# Patient Record
Sex: FEMALE | Race: OTHER | Employment: UNEMPLOYED | URBAN - METROPOLITAN AREA
[De-identification: names, ages, dates, MRNs, and addresses within clinical notes are randomized per-mention and may not be internally consistent; named-entity substitution may affect disease eponyms.]

---

## 2017-01-27 RX ORDER — LOSARTAN POTASSIUM AND HYDROCHLOROTHIAZIDE 12.5; 1 MG/1; MG/1
TABLET ORAL
Qty: 90 TABLET | Refills: 1 | Status: SHIPPED | OUTPATIENT
Start: 2017-01-27 | End: 2017-07-19

## 2017-01-27 RX ORDER — PANTOPRAZOLE SODIUM 40 MG/1
TABLET, DELAYED RELEASE ORAL
Qty: 180 TABLET | Refills: 1 | Status: SHIPPED | OUTPATIENT
Start: 2017-01-27 | End: 2017-07-19

## 2017-01-27 RX ORDER — CARVEDILOL 6.25 MG/1
TABLET ORAL
Qty: 180 TABLET | Refills: 1 | Status: SHIPPED | OUTPATIENT
Start: 2017-01-27 | End: 2017-07-19

## 2017-07-21 RX ORDER — PANTOPRAZOLE SODIUM 40 MG/1
TABLET, DELAYED RELEASE ORAL
Qty: 180 TABLET | Refills: 1 | Status: SHIPPED | OUTPATIENT
Start: 2017-07-21 | End: 2017-10-19

## 2017-07-21 RX ORDER — CARVEDILOL 6.25 MG/1
TABLET ORAL
Qty: 180 TABLET | Refills: 1 | Status: SHIPPED | OUTPATIENT
Start: 2017-07-21 | End: 2017-10-19

## 2017-07-21 RX ORDER — LOSARTAN POTASSIUM AND HYDROCHLOROTHIAZIDE 12.5; 1 MG/1; MG/1
TABLET ORAL
Qty: 90 TABLET | Refills: 1 | Status: SHIPPED | OUTPATIENT
Start: 2017-07-21 | End: 2017-10-19

## 2017-08-09 ENCOUNTER — TELEPHONE (OUTPATIENT)
Dept: INTERNAL MEDICINE CLINIC | Facility: CLINIC | Age: 52
End: 2017-08-09

## 2017-08-09 NOTE — TELEPHONE ENCOUNTER
Actions Requested:  OV with Dr. Ovidio Mendoza 8/10 8:30 AM [facility], sent to Dr. Ovidio Mendoza and Dr. Kemal Jewell PCP as Edenilson Solis, thank you.   Problem: vaginal itching/burning/irritation  Onset and Timing: about 2 weeks  Associated Symptoms: vague feeling that she is aware of her a

## 2017-08-10 ENCOUNTER — OFFICE VISIT (OUTPATIENT)
Dept: INTERNAL MEDICINE CLINIC | Facility: CLINIC | Age: 52
End: 2017-08-10

## 2017-08-10 ENCOUNTER — LAB ENCOUNTER (OUTPATIENT)
Dept: LAB | Facility: HOSPITAL | Age: 52
End: 2017-08-10
Attending: INTERNAL MEDICINE
Payer: COMMERCIAL

## 2017-08-10 VITALS
SYSTOLIC BLOOD PRESSURE: 142 MMHG | HEART RATE: 75 BPM | HEIGHT: 64 IN | WEIGHT: 183.13 LBS | BODY MASS INDEX: 31.27 KG/M2 | DIASTOLIC BLOOD PRESSURE: 91 MMHG | RESPIRATION RATE: 18 BRPM

## 2017-08-10 DIAGNOSIS — R06.02 SOB (SHORTNESS OF BREATH): ICD-10-CM

## 2017-08-10 DIAGNOSIS — N89.8 VAGINAL DISCHARGE: ICD-10-CM

## 2017-08-10 DIAGNOSIS — R39.9 URINARY SYMPTOM OR SIGN: ICD-10-CM

## 2017-08-10 DIAGNOSIS — R10.2 PELVIC PAIN: Primary | ICD-10-CM

## 2017-08-10 LAB
ANION GAP SERPL CALC-SCNC: 7 MMOL/L (ref 0–18)
APPEARANCE: YELLOW
BASOPHILS # BLD: 0 K/UL (ref 0–0.2)
BASOPHILS NFR BLD: 0 %
BILIRUBIN: NEGATIVE
BUN SERPL-MCNC: 7 MG/DL (ref 8–20)
BUN/CREAT SERPL: 11.9 (ref 10–20)
CALCIUM SERPL-MCNC: 9.8 MG/DL (ref 8.5–10.5)
CHLORIDE SERPL-SCNC: 101 MMOL/L (ref 95–110)
CO2 SERPL-SCNC: 30 MMOL/L (ref 22–32)
CREAT SERPL-MCNC: 0.59 MG/DL (ref 0.5–1.5)
EOSINOPHIL # BLD: 0.2 K/UL (ref 0–0.7)
EOSINOPHIL NFR BLD: 2 %
ERYTHROCYTE [DISTWIDTH] IN BLOOD BY AUTOMATED COUNT: 14.8 % (ref 11–15)
GLUCOSE (URINE DIPSTICK): NEGATIVE MG/DL
GLUCOSE SERPL-MCNC: 140 MG/DL (ref 70–99)
HCT VFR BLD AUTO: 35.7 % (ref 35–48)
HGB BLD-MCNC: 12.1 G/DL (ref 12–16)
KETONES (URINE DIPSTICK): NEGATIVE MG/DL
LEUKOCYTES: NEGATIVE
LYMPHOCYTES # BLD: 2.3 K/UL (ref 1–4)
LYMPHOCYTES NFR BLD: 31 %
MCH RBC QN AUTO: 29.4 PG (ref 27–32)
MCHC RBC AUTO-ENTMCNC: 33.9 G/DL (ref 32–37)
MCV RBC AUTO: 86.5 FL (ref 80–100)
MONOCYTES # BLD: 0.4 K/UL (ref 0–1)
MONOCYTES NFR BLD: 6 %
MULTISTIX LOT#: ABNORMAL NUMERIC
NEUTROPHILS # BLD AUTO: 4.6 K/UL (ref 1.8–7.7)
NEUTROPHILS NFR BLD: 61 %
NITRITE, URINE: NEGATIVE
OSMOLALITY UR CALC.SUM OF ELEC: 286 MOSM/KG (ref 275–295)
PH, URINE: 7.5 (ref 4.5–8)
PLATELET # BLD AUTO: 254 K/UL (ref 140–400)
PMV BLD AUTO: 10.1 FL (ref 7.4–10.3)
POTASSIUM SERPL-SCNC: 3.9 MMOL/L (ref 3.3–5.1)
PROTEIN (URINE DIPSTICK): NEGATIVE MG/DL
RBC # BLD AUTO: 4.12 M/UL (ref 3.7–5.4)
SODIUM SERPL-SCNC: 138 MMOL/L (ref 136–144)
SPECIFIC GRAVITY: 1.01 (ref 1–1.03)
URINE-COLOR: CLEAR
UROBILINOGEN,SEMI-QN: 0.2 MG/DL (ref 0–1.9)
WBC # BLD AUTO: 7.6 K/UL (ref 4–11)

## 2017-08-10 PROCEDURE — 36415 COLL VENOUS BLD VENIPUNCTURE: CPT

## 2017-08-10 PROCEDURE — 81002 URINALYSIS NONAUTO W/O SCOPE: CPT | Performed by: INTERNAL MEDICINE

## 2017-08-10 PROCEDURE — 99214 OFFICE O/P EST MOD 30 MIN: CPT | Performed by: INTERNAL MEDICINE

## 2017-08-10 PROCEDURE — 85025 COMPLETE CBC W/AUTO DIFF WBC: CPT

## 2017-08-10 PROCEDURE — 80048 BASIC METABOLIC PNL TOTAL CA: CPT

## 2017-08-10 PROCEDURE — 99212 OFFICE O/P EST SF 10 MIN: CPT | Performed by: INTERNAL MEDICINE

## 2017-08-10 NOTE — PROGRESS NOTES
HPI:    Patient ID: Rafael Reed is a 45year old female. She has lower pelvic discomfort, vaginal discharge, and urinary frequency. She was on clindamycin for dental work. She has been having some SOB also.       Vaginal Discharge   The patient's p (VOLTAREN) 1 % Transdermal Gel EXTERNAL APPLICATION 2 TIMES A DAY AS NEEDED Disp: 1 Tube Rfl: 3   Cyanocobalamin (VITAMIN B-12) 1000 MCG Sublingual SL Tab Place  under the tongue.  take 1 Tablet by Sublingual route  every day Disp:  Rfl:    Fluocinolone Ace of yeast.         [id number]

## 2017-08-12 ENCOUNTER — TELEPHONE (OUTPATIENT)
Dept: INTERNAL MEDICINE CLINIC | Facility: CLINIC | Age: 52
End: 2017-08-12

## 2017-08-12 LAB
GENITAL VAGINOSIS SCREEN: POSITIVE
TRICHOMONAS SCREEN: NEGATIVE

## 2017-08-12 RX ORDER — METRONIDAZOLE 500 MG/1
500 TABLET ORAL 2 TIMES DAILY
Qty: 14 TABLET | Refills: 0 | Status: CANCELLED | OUTPATIENT
Start: 2017-08-12 | End: 2017-08-19

## 2017-08-12 NOTE — TELEPHONE ENCOUNTER
Please call pt. Her vaginal culture showed that she has bacterial vaginosis. This is just overgrowth of normal bacteria. It is treated with metronidazole vaginal gel or oral pills.   The advantage to the pills is that they are not messy, but the disadvant

## 2017-08-14 NOTE — TELEPHONE ENCOUNTER
LMTCB. Please transfer to Triage any time. Encounter changed to Acute from Results to be addressed in more timely manner.

## 2017-08-15 ENCOUNTER — TELEPHONE (OUTPATIENT)
Dept: INTERNAL MEDICINE CLINIC | Facility: CLINIC | Age: 52
End: 2017-08-15

## 2017-08-15 RX ORDER — METRONIDAZOLE 500 MG/1
500 TABLET ORAL 2 TIMES DAILY
Qty: 14 TABLET | Refills: 0 | Status: SHIPPED | OUTPATIENT
Start: 2017-08-15 | End: 2017-08-22

## 2017-08-15 RX ORDER — METRONIDAZOLE 7.5 MG/G
1 GEL VAGINAL NIGHTLY
Qty: 1 TUBE | Refills: 0 | Status: SHIPPED | OUTPATIENT
Start: 2017-08-15 | End: 2017-08-15

## 2017-08-15 NOTE — TELEPHONE ENCOUNTER
Pt calling back, advised of results. She verbalized understanding. She prefers to use the vaginal gel, medication sent to requested Chris W Teresita Slater in [facility].

## 2017-08-15 NOTE — TELEPHONE ENCOUNTER
Pt calling back, she states vaginal gel is $90 and wants us to send pills instead, she thinks they will be covered. Avni sent to  to clarify order for oral pills.

## 2017-09-11 ENCOUNTER — HOSPITAL ENCOUNTER (OUTPATIENT)
Dept: MAMMOGRAPHY | Facility: HOSPITAL | Age: 52
Discharge: HOME | End: 2017-09-11
Attending: INTERNAL MEDICINE
Payer: COMMERCIAL

## 2017-09-11 ENCOUNTER — HOSPITAL ENCOUNTER (OUTPATIENT)
Dept: ULTRASOUND IMAGING | Facility: HOSPITAL | Age: 52
Discharge: HOME | End: 2017-09-11
Attending: INTERNAL MEDICINE
Payer: COMMERCIAL

## 2017-09-11 DIAGNOSIS — Z00.00 ROUTINE PHYSICAL EXAMINATION: ICD-10-CM

## 2017-09-11 DIAGNOSIS — R10.2 PELVIC PAIN: ICD-10-CM

## 2017-09-11 PROCEDURE — 76830 TRANSVAGINAL US NON-OB: CPT | Performed by: INTERNAL MEDICINE

## 2017-09-11 PROCEDURE — 76856 US EXAM PELVIC COMPLETE: CPT | Performed by: INTERNAL MEDICINE

## 2017-09-11 PROCEDURE — 77067 SCR MAMMO BI INCL CAD: CPT | Performed by: INTERNAL MEDICINE

## 2017-09-11 PROCEDURE — 93975 VASCULAR STUDY: CPT | Performed by: INTERNAL MEDICINE

## 2017-10-19 ENCOUNTER — LAB ENCOUNTER (OUTPATIENT)
Dept: LAB | Facility: HOSPITAL | Age: 52
End: 2017-10-19
Attending: INTERNAL MEDICINE
Payer: COMMERCIAL

## 2017-10-19 ENCOUNTER — OFFICE VISIT (OUTPATIENT)
Dept: INTERNAL MEDICINE CLINIC | Facility: CLINIC | Age: 52
End: 2017-10-19

## 2017-10-19 ENCOUNTER — TELEPHONE (OUTPATIENT)
Dept: INTERNAL MEDICINE CLINIC | Facility: CLINIC | Age: 52
End: 2017-10-19

## 2017-10-19 ENCOUNTER — MED REC SCAN ONLY (OUTPATIENT)
Dept: INTERNAL MEDICINE CLINIC | Facility: CLINIC | Age: 52
End: 2017-10-19

## 2017-10-19 VITALS
DIASTOLIC BLOOD PRESSURE: 92 MMHG | BODY MASS INDEX: 30.56 KG/M2 | TEMPERATURE: 98 F | HEART RATE: 62 BPM | WEIGHT: 179 LBS | HEIGHT: 64 IN | RESPIRATION RATE: 16 BRPM | SYSTOLIC BLOOD PRESSURE: 143 MMHG

## 2017-10-19 DIAGNOSIS — Z00.00 ROUTINE PHYSICAL EXAMINATION: ICD-10-CM

## 2017-10-19 DIAGNOSIS — G89.29 CHRONIC PAIN OF LEFT KNEE: ICD-10-CM

## 2017-10-19 DIAGNOSIS — L03.012 CELLULITIS OF LEFT INDEX FINGER: ICD-10-CM

## 2017-10-19 DIAGNOSIS — M25.562 CHRONIC PAIN OF LEFT KNEE: ICD-10-CM

## 2017-10-19 DIAGNOSIS — I10 ESSENTIAL HYPERTENSION: ICD-10-CM

## 2017-10-19 DIAGNOSIS — Z23 NEED FOR VACCINATION: Primary | ICD-10-CM

## 2017-10-19 DIAGNOSIS — K21.9 GERD WITHOUT ESOPHAGITIS: ICD-10-CM

## 2017-10-19 DIAGNOSIS — L30.9 ECZEMA, UNSPECIFIED TYPE: ICD-10-CM

## 2017-10-19 DIAGNOSIS — Z01.419 PAP TEST, AS PART OF ROUTINE GYNECOLOGICAL EXAMINATION: ICD-10-CM

## 2017-10-19 DIAGNOSIS — R73.9 ELEVATED BLOOD SUGAR: ICD-10-CM

## 2017-10-19 DIAGNOSIS — R00.2 PALPITATIONS: ICD-10-CM

## 2017-10-19 DIAGNOSIS — J01.01 ACUTE RECURRENT MAXILLARY SINUSITIS: ICD-10-CM

## 2017-10-19 PROCEDURE — 36415 COLL VENOUS BLD VENIPUNCTURE: CPT

## 2017-10-19 PROCEDURE — 82043 UR ALBUMIN QUANTITATIVE: CPT

## 2017-10-19 PROCEDURE — 82570 ASSAY OF URINE CREATININE: CPT

## 2017-10-19 PROCEDURE — 82306 VITAMIN D 25 HYDROXY: CPT

## 2017-10-19 PROCEDURE — 90471 IMMUNIZATION ADMIN: CPT | Performed by: INTERNAL MEDICINE

## 2017-10-19 PROCEDURE — 82607 VITAMIN B-12: CPT

## 2017-10-19 PROCEDURE — 83036 HEMOGLOBIN GLYCOSYLATED A1C: CPT

## 2017-10-19 PROCEDURE — 84443 ASSAY THYROID STIM HORMONE: CPT

## 2017-10-19 PROCEDURE — 80053 COMPREHEN METABOLIC PANEL: CPT

## 2017-10-19 PROCEDURE — G0438 PPPS, INITIAL VISIT: HCPCS | Performed by: INTERNAL MEDICINE

## 2017-10-19 PROCEDURE — 81003 URINALYSIS AUTO W/O SCOPE: CPT

## 2017-10-19 PROCEDURE — 80061 LIPID PANEL: CPT

## 2017-10-19 PROCEDURE — 90686 IIV4 VACC NO PRSV 0.5 ML IM: CPT | Performed by: INTERNAL MEDICINE

## 2017-10-19 PROCEDURE — 85025 COMPLETE CBC W/AUTO DIFF WBC: CPT

## 2017-10-19 PROCEDURE — 99214 OFFICE O/P EST MOD 30 MIN: CPT | Performed by: INTERNAL MEDICINE

## 2017-10-19 PROCEDURE — 99396 PREV VISIT EST AGE 40-64: CPT | Performed by: INTERNAL MEDICINE

## 2017-10-19 RX ORDER — CLINDAMYCIN PHOSPHATE 10 MG/G
1 GEL TOPICAL AS NEEDED
Qty: 60 G | Refills: 3 | Status: SHIPPED | OUTPATIENT
Start: 2017-10-19 | End: 2019-04-11

## 2017-10-19 RX ORDER — LOSARTAN POTASSIUM AND HYDROCHLOROTHIAZIDE 12.5; 1 MG/1; MG/1
TABLET ORAL
Qty: 90 TABLET | Refills: 2 | Status: SHIPPED | OUTPATIENT
Start: 2017-10-19 | End: 2017-12-07

## 2017-10-19 RX ORDER — PANTOPRAZOLE SODIUM 40 MG/1
40 TABLET, DELAYED RELEASE ORAL 2 TIMES DAILY
Qty: 180 TABLET | Refills: 2 | Status: SHIPPED | OUTPATIENT
Start: 2017-10-19 | End: 2018-08-23

## 2017-10-19 RX ORDER — FLUTICASONE PROPIONATE 50 MCG
SPRAY, SUSPENSION (ML) NASAL
Qty: 3 BOTTLE | Refills: 1 | Status: SHIPPED | OUTPATIENT
Start: 2017-10-19 | End: 2018-07-12

## 2017-10-19 RX ORDER — MOMETASONE FUROATE 1 MG/G
CREAM TOPICAL
Qty: 45 G | Refills: 0 | Status: SHIPPED | OUTPATIENT
Start: 2017-10-19 | End: 2020-01-31

## 2017-10-19 RX ORDER — FERROUS FUMARATE 106; 10; 6; 30; 5; 1; 15; 10; 200; 6.9; 18.2; .8; 1.3 MG/1; MG/1; MG/1; MG/1; MG/1; MG/1; UG/1; MG/1; MG/1; MG/1; MG/1; MG/1; MG/1
CAPSULE ORAL
Qty: 90 CAPSULE | Refills: 2 | Status: SHIPPED | OUTPATIENT
Start: 2017-10-19 | End: 2017-12-08

## 2017-10-19 RX ORDER — CARVEDILOL 6.25 MG/1
6.25 TABLET ORAL 2 TIMES DAILY
Qty: 180 TABLET | Refills: 2 | Status: SHIPPED | OUTPATIENT
Start: 2017-10-19 | End: 2018-11-14

## 2017-10-19 RX ORDER — AZITHROMYCIN 250 MG/1
TABLET, FILM COATED ORAL
Qty: 6 TABLET | Refills: 0 | Status: SHIPPED | OUTPATIENT
Start: 2017-10-19 | End: 2017-11-06 | Stop reason: ALTCHOICE

## 2017-10-19 NOTE — PROGRESS NOTES
REASON FOR VISIT:    Chelle Lassiter is a 47year old female who presents for an [address].       lmp-every month x 3 months but has been irregular before that.has a hx of ovarian cyst as well as fibroids which will need follow-up due to irre problem. The current episode started more than 1 year ago. The problem has been waxing and waning. On average, each episode lasts 10 minutes. The symptoms are aggravated by stress. Associated symptoms include anxiety and malaise/fatigue.  Pertinent negative Acute recurrent maxillary sinusitis     Cellulitis of left index finger     Elevated blood sugar     Dermatitis, eczematoid     Pap test, as part of routine gynecological examination      General Health     How would you describe your current health state? gender LDL Cholesterol (mg/dL)   Date Value   10/19/2017 139 (H)   02/17/2016 114 (H)       Diabetes Screening  if history of high blood pressure or other  risk factors GLYCOHEMOGLOBIN (HgA1c) (L) (%)   Date Value   12/22/2012 6.1 (H)     Glycohemoglobin ( mouth 2 (two) times daily. Disp: 180 tablet Rfl: 2   azithromycin 250 MG Oral Tab Take two tablets by mouth today, then one daily. Disp: 6 tablet Rfl: 0   Mometasone Furoate 0.1 % External Cream External application 2 times daily as directed.  Disp: 45 g Rf shortness of breath with exertion  CARDIOVASCULAR: denies chest pain on exertion  GI: denies abdominal pain, denies heartburn  : denies dysuria, vaginal discharge or itching, periods regular   MUSCULOSKELETAL: denies back pain  NEURO: denies headaches  P Zyrtec 10 mg 1 tablet once daily         Relevant Medications    azithromycin 250 MG Oral Tab    Cellulitis of left index finger     Small area of possible granuloma on the lateral aspect of the tip of the left index finger.   Most likely related to a small temperature (!) 97.5 °F (36.4 °C), temperature source Oral, resp. rate 16, height 5' 4\" (1.626 m), weight 179 lb (81.2 kg), not currently breastfeeding.   Patient has not taken her blood pressure medications today and she is fasting and planning to going f 12/14/2010, 11/08/2011, 10/02/2012, 10/29/2013, 10/01/2015, 10/03/2016   • TDAP 09/20/2010     Flu shot provided today.              Relevant Orders    CBC WITH DIFFERENTIAL WITH PLATELET (Completed)    COMP METABOLIC PANEL (14) (Completed)    HEMOGLOBIN A1 apply by TOPICAL route 2 times every day a thin film to affected area(s)  -     Diclofenac Sodium (VOLTAREN) 1 % Transdermal Gel; EXTERNAL APPLICATION 2 TIMES A DAY AS NEEDED  -     Fe Fum-FA-B Tca-Z-Xx-Mg-Mn-Cu (PUREFE PLUS) 106-1 MG Oral Cap; 1 tab po q

## 2017-10-19 NOTE — ASSESSMENT & PLAN NOTE
Recurrent episodes of sinus congestion, nasal obstruction, postnasal drip and a cough. This episode has gone on for well over 10 days. No fevers or chills at this time. Z-Ney as directed.   Flonase 1 puff each nostril 2 times daily and advised to change

## 2017-10-19 NOTE — ASSESSMENT & PLAN NOTE
Band of irritated skin with hyperpigmentation along the bra line. Advised to change  to a front opening closure. Local application of mometasone 1-2 times daily for the next 2 weeks. Consider dermatology follow-up if necessary.

## 2017-10-19 NOTE — PATIENT INSTRUCTIONS
Problem List Items Addressed This Visit        Unprioritized    Acute recurrent maxillary sinusitis     Recurrent episodes of sinus congestion, nasal obstruction, postnasal drip and a cough. This episode has gone on for well over 10 days.   No fevers or ch mild gastritis. Patient has been on pantoprazole and has tolerated it well. She has not developed any difficulty swallowing or choking at this time. So we will continue to monitor.   Will recommend gastroenterology follow-up for any further problems Administered   • Influenza 12/14/2010, 11/08/2011, 10/02/2012, 10/29/2013, 10/01/2015, 10/03/2016   • TDAP 09/20/2010     Flu shot provided today.              Relevant Orders    CBC WITH DIFFERENTIAL WITH PLATELET    COMP METABOLIC PANEL (14)    HEMOGLOBIN

## 2017-10-19 NOTE — ASSESSMENT & PLAN NOTE
Intermittent palpitations but much better at this time. She has been seen by cardiology and has had an extensive cardiac workup completed. Continue on Coreg at 6.25 mg 1 tablet 2 times daily.

## 2017-10-19 NOTE — ASSESSMENT & PLAN NOTE
Small area of possible granuloma on the lateral aspect of the tip of the left index finger. Most likely related to a small foreign body. Lesion has been present for well over 2 weeks.   Advised patient to follow-up with Dr. Reilly Tobar for an [id number]

## 2017-10-19 NOTE — ASSESSMENT & PLAN NOTE
Elevated blood sugars with the last labs but was done nonfasting after her breakfast.  Recheck labs have been ordered

## 2017-10-19 NOTE — ASSESSMENT & PLAN NOTE
Blood pressure 143/92, pulse 62, temperature (!) 97.5 °F (36.4 °C), temperature source Oral, resp. rate 16, height 5' 4\" (1.626 m), weight 179 lb (81.2 kg), not currently breastfeeding.   Patient has not taken her blood pressure medications today and she i

## 2017-10-19 NOTE — ASSESSMENT & PLAN NOTE
Normal exam.  Labs as ordered. Skin check normal.  No significant abnormal nevi. Breast exam completed–no palpable abnormalities, discharge from the nipples or axillary adenopathy.   Mammogram,1 Yr due on 09/11/2018  No cervical or inguinal lymphadenopath

## 2017-10-19 NOTE — ASSESSMENT & PLAN NOTE
Asymptomatic at this time. Patient has had an EGD and colonoscopy done in 2012 showing grade 1 esophagitis most likely reflux related and mild gastritis. Patient has been on pantoprazole and has tolerated it well.   She has not developed any difficulty sw

## 2017-10-19 NOTE — ASSESSMENT & PLAN NOTE
Pap has been completed. Recent ultrasound of the pelvis did not show any significant change in the size of her fibroids.   Ovaries look normal

## 2017-10-19 NOTE — ASSESSMENT & PLAN NOTE
History of patellofemoral arthritis. Continued issues with pain and x-rays did note mild medial compartment arthritis as well. Has had continued restriction and stiffness in the knee in addition.   She has been using Voltaren gel as she has severe reflux

## 2017-10-20 NOTE — TELEPHONE ENCOUNTER
PA for Diclofenac sodium 1% gel completed with [employer] via CMM response time 3-5 business days KEY [id number].

## 2017-10-24 NOTE — TELEPHONE ENCOUNTER
Fax received from [employer] stating diclofenac sodium 1% gel is denied. pt must try a generic prescription strength oral NSAID in the past 90 days.  Covered are ibuprofen, naproxen, diclofenac, meloxicam

## 2017-10-25 NOTE — TELEPHONE ENCOUNTER
PA resubmitted with additional clinical information provided from Dr. Dante Ansari that patient cannot tolerate NSAIDS due to GERD/gastritis. Claim marked urgent response time up to 72 hours KEY [id number].

## 2017-10-25 NOTE — TELEPHONE ENCOUNTER
Please let the patient know that the medication is not covered any further. She does have gastritis and cannot take ibuprofen or naproxen.   She may use over-the-counter pain medications like BenGay at this time

## 2017-10-26 NOTE — TELEPHONE ENCOUNTER
Fax received from "[employer] stating diclofenac sodium gel is denied. LM on pt voice mail to inform her  Dr Lucius Rodriguez recommends pt try OTC BenGay. Instructed pt to call back if she has any further questions.

## 2017-11-06 ENCOUNTER — OFFICE VISIT (OUTPATIENT)
Dept: ORTHOPEDICS CLINIC | Facility: CLINIC | Age: 52
End: 2017-11-06

## 2017-11-06 ENCOUNTER — HOSPITAL ENCOUNTER (OUTPATIENT)
Dept: GENERAL RADIOLOGY | Facility: HOSPITAL | Age: 52
Discharge: HOME | End: 2017-11-06
Attending: ORTHOPAEDIC SURGERY
Payer: COMMERCIAL

## 2017-11-06 VITALS — SYSTOLIC BLOOD PRESSURE: 168 MMHG | RESPIRATION RATE: 12 BRPM | HEART RATE: 60 BPM | DIASTOLIC BLOOD PRESSURE: 96 MMHG

## 2017-11-06 DIAGNOSIS — R52 PAIN: Primary | ICD-10-CM

## 2017-11-06 DIAGNOSIS — R52 PAIN: ICD-10-CM

## 2017-11-06 DIAGNOSIS — M25.562 ARTHRALGIA OF LEFT KNEE: ICD-10-CM

## 2017-11-06 DIAGNOSIS — M22.42 CHONDROMALACIA OF LEFT PATELLA: ICD-10-CM

## 2017-11-06 PROCEDURE — 99212 OFFICE O/P EST SF 10 MIN: CPT | Performed by: ORTHOPAEDIC SURGERY

## 2017-11-06 PROCEDURE — 99243 OFF/OP CNSLTJ NEW/EST LOW 30: CPT | Performed by: ORTHOPAEDIC SURGERY

## 2017-11-06 PROCEDURE — 20610 DRAIN/INJ JOINT/BURSA W/O US: CPT | Performed by: ORTHOPAEDIC SURGERY

## 2017-11-06 PROCEDURE — 73560 X-RAY EXAM OF KNEE 1 OR 2: CPT | Performed by: ORTHOPAEDIC SURGERY

## 2017-11-06 PROCEDURE — 73565 X-RAY EXAM OF KNEES: CPT | Performed by: ORTHOPAEDIC SURGERY

## 2017-11-06 NOTE — PROGRESS NOTES
Per verbal order from VT, draw up 3ml of Kenalog 10 and 3ml of 1% lidocaine for cortisone injection to Left knee. Pt given steroid information sheet. VS prior to injection 60-/88. Time out progressed.  Pt signed consent for left knee steroid injeciton

## 2017-11-06 NOTE — H&P
Chief Complaint: left knee pain    NURSING INTAKE COMMENTS: Patient presents with:  Consult: Pt is here for bilateral knee pain. Left knee more painful. Right knee is starting. Pt is at home . not working at present.  Denies any injury to the knee or surgery and electronic medical record. Pertinent positives and negatives noted in the the HPI. Physical Examination:  There is no height or weight on file to calculate BMI. This 47year old female is A&O in no acute distress.   KNEE EXAM: LEFT  RIGHT   Range

## 2017-12-07 ENCOUNTER — HOSPITAL ENCOUNTER (OUTPATIENT)
Dept: GENERAL RADIOLOGY | Facility: HOSPITAL | Age: 52
Discharge: HOME | End: 2017-12-07
Attending: INTERNAL MEDICINE
Payer: COMMERCIAL

## 2017-12-07 ENCOUNTER — TELEPHONE (OUTPATIENT)
Dept: INTERNAL MEDICINE CLINIC | Facility: CLINIC | Age: 52
End: 2017-12-07

## 2017-12-07 ENCOUNTER — OFFICE VISIT (OUTPATIENT)
Dept: INTERNAL MEDICINE CLINIC | Facility: CLINIC | Age: 52
End: 2017-12-07

## 2017-12-07 VITALS
TEMPERATURE: 98 F | WEIGHT: 187.19 LBS | SYSTOLIC BLOOD PRESSURE: 126 MMHG | DIASTOLIC BLOOD PRESSURE: 90 MMHG | RESPIRATION RATE: 18 BRPM | HEART RATE: 61 BPM | HEIGHT: 64 IN | BODY MASS INDEX: 31.96 KG/M2

## 2017-12-07 DIAGNOSIS — M25.511 BILATERAL SHOULDER PAIN, UNSPECIFIED CHRONICITY: ICD-10-CM

## 2017-12-07 DIAGNOSIS — E55.9 VITAMIN D DEFICIENCY: ICD-10-CM

## 2017-12-07 DIAGNOSIS — M25.512 BILATERAL SHOULDER PAIN, UNSPECIFIED CHRONICITY: ICD-10-CM

## 2017-12-07 DIAGNOSIS — M54.2 NECK PAIN: ICD-10-CM

## 2017-12-07 DIAGNOSIS — K21.9 GERD WITHOUT ESOPHAGITIS: ICD-10-CM

## 2017-12-07 DIAGNOSIS — M54.2 NECK PAIN: Primary | ICD-10-CM

## 2017-12-07 DIAGNOSIS — K22.4 ESOPHAGEAL SPASM: ICD-10-CM

## 2017-12-07 DIAGNOSIS — I10 ESSENTIAL HYPERTENSION: ICD-10-CM

## 2017-12-07 DIAGNOSIS — E11.9 TYPE 2 DIABETES MELLITUS WITHOUT COMPLICATION, WITHOUT LONG-TERM CURRENT USE OF INSULIN (HCC): ICD-10-CM

## 2017-12-07 DIAGNOSIS — M54.2 CERVICALGIA: ICD-10-CM

## 2017-12-07 PROCEDURE — 99212 OFFICE O/P EST SF 10 MIN: CPT | Performed by: INTERNAL MEDICINE

## 2017-12-07 PROCEDURE — 72050 X-RAY EXAM NECK SPINE 4/5VWS: CPT | Performed by: INTERNAL MEDICINE

## 2017-12-07 PROCEDURE — 99215 OFFICE O/P EST HI 40 MIN: CPT | Performed by: INTERNAL MEDICINE

## 2017-12-07 RX ORDER — FLUOCINOLONE ACETONIDE 0.11 MG/ML
OIL TOPICAL
Qty: 118 ML | Refills: 2 | Status: SHIPPED | OUTPATIENT
Start: 2017-12-07 | End: 2020-01-31

## 2017-12-07 RX ORDER — METFORMIN HYDROCHLORIDE 500 MG/1
500 TABLET, EXTENDED RELEASE ORAL DAILY
Qty: 90 TABLET | Refills: 1 | Status: SHIPPED | OUTPATIENT
Start: 2017-12-07 | End: 2021-01-05

## 2017-12-07 RX ORDER — LOSARTAN POTASSIUM AND HYDROCHLOROTHIAZIDE 25; 100 MG/1; MG/1
1 TABLET ORAL DAILY
Qty: 90 TABLET | Refills: 3 | Status: SHIPPED | OUTPATIENT
Start: 2017-12-07 | End: 2018-11-14

## 2017-12-07 RX ORDER — METFORMIN HYDROCHLORIDE 500 MG/1
500 TABLET, EXTENDED RELEASE ORAL DAILY
Qty: 30 TABLET | Refills: 0 | Status: SHIPPED | OUTPATIENT
Start: 2017-12-07 | End: 2017-12-07

## 2017-12-07 RX ORDER — ERGOCALCIFEROL 1.25 MG/1
50000 CAPSULE ORAL WEEKLY
Qty: 4 CAPSULE | Refills: 0 | Status: SHIPPED | OUTPATIENT
Start: 2017-12-07 | End: 2017-12-07

## 2017-12-07 RX ORDER — TIZANIDINE 2 MG/1
2 TABLET ORAL NIGHTLY
Qty: 30 TABLET | Refills: 0 | Status: SHIPPED | OUTPATIENT
Start: 2017-12-07 | End: 2018-02-02

## 2017-12-07 RX ORDER — MELOXICAM 15 MG/1
15 TABLET ORAL DAILY
Qty: 30 TABLET | Refills: 0 | Status: SHIPPED | OUTPATIENT
Start: 2017-12-07 | End: 2018-02-02

## 2017-12-07 RX ORDER — ERGOCALCIFEROL 1.25 MG/1
50000 CAPSULE ORAL WEEKLY
Qty: 12 CAPSULE | Refills: 0 | Status: SHIPPED | OUTPATIENT
Start: 2017-12-07 | End: 2018-01-06

## 2017-12-07 RX ORDER — FLUOCINOLONE ACETONIDE 0.11 MG/ML
OIL TOPICAL
COMMUNITY
End: 2017-12-07

## 2017-12-07 NOTE — ASSESSMENT & PLAN NOTE
Random episodes of choking which lasts for about 3-5 minutes associated with sense of tightness in the throat has well as tearing from the eye. This does not seem to last for any length of time that would suggest that this is an allergic reaction.   Will r

## 2017-12-07 NOTE — PROGRESS NOTES
HPI:    Patient ID: Sandy Araiza is a 47year old female.    egd /colonoscopy in 2012        IMPRESSION:  1.  Normal colonoscopy to the terminal ileum. 2.  Small hemorrhoids. FINDINGS:  1.  Pharynx, larynx, and hypopharynx were normal.  2. The esopha current episode started more than 1 year ago. The problem occurs constantly.  Progression since onset: hba1c at 6.1,, discuss possibly starting on metformin and low doses to help with management of borderline elevation in blood sugars, cholesterol as well a EGD.       Review of Systems   Constitutional: Negative. HENT: Negative. Eyes: Negative. Respiratory: Negative. Cardiovascular: Negative. Gastrointestinal: Positive for heartburn. Endocrine: Negative. Genitourinary: Negative.     Musculo topically as needed. apply by TOPICAL route 2 times every day a thin film to affected area(s) Disp: 60 g Rfl: 3   Fluocinolone Acetonide (FLUOCINOLONE ACETONIDE BODY) 0.01 % Apply Externally Oil Apply  topically.  EXTERNAL APPLICATION   2 TIMES A WEEK Disp: reviewed. ASSESSMENT/PLAN:     Problem List Items Addressed This Visit        Unprioritized    Cervicalgia     Advised to stop all aspirin, ibuprofen, Advil like medications.   Consider starting on meloxicam at 15 mg 1 tablet once daily after me losartan hydrochlorothiazide 100/25 once daily and will reassess blood pressure in the next few weeks. Watch the salt in the diet and drink plenty of fluids as directed.          Relevant Medications    Losartan Potassium-HCTZ 100-25 MG Oral Tab    Other R (50,000 Units total) by mouth once a week. MetFORMIN HCl  MG Oral Tablet 24 Hr 90 tablet 1      Sig: Take 1 tablet (500 mg total) by mouth daily. Meloxicam 15 MG Oral Tab 30 tablet 0      Sig: Take 1 tablet (15 mg total) by mouth daily.

## 2017-12-07 NOTE — ASSESSMENT & PLAN NOTE
Patient did have an EGD and colonoscopy done in 2012–grade 1 esophagitis–reflux related with mild gastritis. She has been on pantoprazole and has been watching her diet.   She has been complaining of some choking sensation to random not associated with eat

## 2017-12-07 NOTE — ASSESSMENT & PLAN NOTE
Blood pressure 126/90, pulse 61, temperature 97.7 °F (36.5 °C), temperature source Oral, resp. rate 18, height 5' 4\" (1.626 m), weight 187 lb 3.2 oz (84.9 kg), not currently breastfeeding.      Slightly elevated blood pressure– elevation seems entirely in

## 2017-12-07 NOTE — TELEPHONE ENCOUNTER
There are two different dosages   Please advise. SE-Tan DHA  15-15-1 mg   Which is pending  And   SE-TAN Plus  162-115.2-1mg.

## 2017-12-07 NOTE — ASSESSMENT & PLAN NOTE
Persistent elevation in the fasting blood sugars with a hemoglobin A1c at 6.1 in spite of patient's best efforts. Consider starting on metformin at 500 mg 1 tablet at dinnertime and reassess symptoms as well as labs in the next 3 months.

## 2017-12-07 NOTE — PATIENT INSTRUCTIONS
Problem List Items Addressed This Visit        Unprioritized    Cervicalgia     Advised to stop all aspirin, ibuprofen, Advil like medications. Consider starting on meloxicam at 15 mg 1 tablet once daily after meals if needed for neck pain.   Tizanidine 2 directed.          Relevant Medications    Losartan Potassium-HCTZ 100-25 MG Oral Tab    Other Relevant Orders    COMP METABOLIC PANEL (14)    CBC WITH DIFFERENTIAL WITH PLATELET    HEMOGLOBIN A1C    LIPID PANEL    Type 2 diabetes mellitus without complicat

## 2017-12-07 NOTE — ASSESSMENT & PLAN NOTE
Started on vitamin D at 50,000 units once a week for 12 weeks and will recheck with the next blood draw .

## 2017-12-07 NOTE — ASSESSMENT & PLAN NOTE
Advised to stop all aspirin, ibuprofen, Advil like medications. Consider starting on meloxicam at 15 mg 1 tablet once daily after meals if needed for neck pain. Tizanidine 2 mg at bedtime as a muscle relaxant. Call if symptoms are not better.   X-rays of

## 2017-12-08 ENCOUNTER — OFFICE VISIT (OUTPATIENT)
Dept: PHYSICAL THERAPY | Facility: HOSPITAL | Age: 52
End: 2017-12-08
Attending: ORTHOPAEDIC SURGERY
Payer: COMMERCIAL

## 2017-12-08 DIAGNOSIS — M25.562 ARTHRALGIA OF LEFT KNEE: ICD-10-CM

## 2017-12-08 DIAGNOSIS — M22.42 CHONDROMALACIA OF LEFT PATELLA: ICD-10-CM

## 2017-12-08 PROCEDURE — 97161 PT EVAL LOW COMPLEX 20 MIN: CPT

## 2017-12-08 PROCEDURE — 97110 THERAPEUTIC EXERCISES: CPT

## 2017-12-08 RX ORDER — MULTIVITAMIN/MINERAL 162; 115.2; 200; 10; 6; 5; 15; 1; 30; 10; 18.2; 1.3; .8 MG/1; MG/1; MG/1; MG/1; MG/1; MG/1; UG/1; MG/1; UG/1; MG/1; MG/1; MG/1; MG/1
1 CAPSULE, GELATIN COATED ORAL DAILY
Qty: 90 CAPSULE | Refills: 2 | Status: SHIPPED | OUTPATIENT
Start: 2017-12-08 | End: 2018-01-07

## 2017-12-08 NOTE — PROGRESS NOTES
LOWER EXTREMITY EVALUATION:   Referring Physician: Dr. 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    X___________________________________________________ Date____________________    Certification From: 58/9/7308  To:3/8/2018

## 2017-12-12 ENCOUNTER — APPOINTMENT (OUTPATIENT)
Dept: PHYSICAL THERAPY | Facility: HOSPITAL | Age: 52
End: 2017-12-12
Attending: ORTHOPAEDIC SURGERY
Payer: COMMERCIAL

## 2017-12-12 PROCEDURE — 97110 THERAPEUTIC EXERCISES: CPT

## 2017-12-12 NOTE — PROGRESS NOTES
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

## 2017-12-14 ENCOUNTER — OFFICE VISIT (OUTPATIENT)
Dept: PHYSICAL THERAPY | Facility: HOSPITAL | Age: 52
End: 2017-12-14
Attending: ORTHOPAEDIC SURGERY
Payer: COMMERCIAL

## 2017-12-14 DIAGNOSIS — M22.42 CHONDROMALACIA OF LEFT PATELLA: ICD-10-CM

## 2017-12-14 DIAGNOSIS — M25.562 ARTHRALGIA OF LEFT KNEE: ICD-10-CM

## 2017-12-14 PROCEDURE — 97110 THERAPEUTIC EXERCISES: CPT

## 2017-12-14 NOTE — PROGRESS NOTES
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

## 2017-12-15 ENCOUNTER — OFFICE VISIT (OUTPATIENT)
Dept: OTOLARYNGOLOGY | Facility: CLINIC | Age: 52
End: 2017-12-15

## 2017-12-15 VITALS
SYSTOLIC BLOOD PRESSURE: 142 MMHG | DIASTOLIC BLOOD PRESSURE: 81 MMHG | WEIGHT: 187 LBS | BODY MASS INDEX: 31.92 KG/M2 | HEIGHT: 64 IN | TEMPERATURE: 97 F

## 2017-12-15 DIAGNOSIS — M62.838 NECK MUSCLE SPASM: ICD-10-CM

## 2017-12-15 DIAGNOSIS — H93.8X2 CLOGGED EAR, LEFT: Primary | ICD-10-CM

## 2017-12-15 PROCEDURE — 31575 DIAGNOSTIC LARYNGOSCOPY: CPT | Performed by: OTOLARYNGOLOGY

## 2017-12-15 PROCEDURE — 99243 OFF/OP CNSLTJ NEW/EST LOW 30: CPT | Performed by: OTOLARYNGOLOGY

## 2017-12-15 PROCEDURE — 99212 OFFICE O/P EST SF 10 MIN: CPT | Performed by: OTOLARYNGOLOGY

## 2017-12-15 NOTE — PROGRESS NOTES
Brian Elena is a 47year old female.  Patient presents with:  Throat Problem: constantly clearing her throat, per pt she feels her throat is closing for a while  Ear Problem: clogged left ear for a year    HPI:   For over one year she has been experienc External application 2 times daily as directed. Disp: 45 g Rfl: 0   Cyanocobalamin (VITAMIN B-12) 1000 MCG Sublingual SL Tab Place  under the tongue.  take 1 Tablet by Sublingual route  every day Disp:  Rfl:    Fluocinolone Acetonide (FLUOCINOLONE ACETONIDE gland - Normal. Thyroid gland - Normal.   Psychiatric Normal Orientation - Oriented to time, place, person & situation. Appropriate mood and affect. Lymph Detail Normal Submental. Submandibular. Anterior cervical. Posterior cervical. Supraclavicular.    E muscle relaxants which have not really seem to help her very much yet. I did discuss with her the possibility that this may be contributing to her muscle spasms as well as having some reflux issues that may be contributing.  She will continue the muscle rel

## 2017-12-19 ENCOUNTER — APPOINTMENT (OUTPATIENT)
Dept: PHYSICAL THERAPY | Facility: HOSPITAL | Age: 52
End: 2017-12-19
Attending: ORTHOPAEDIC SURGERY
Payer: COMMERCIAL

## 2017-12-26 ENCOUNTER — APPOINTMENT (OUTPATIENT)
Dept: PHYSICAL THERAPY | Facility: HOSPITAL | Age: 52
End: 2017-12-26
Attending: ORTHOPAEDIC SURGERY
Payer: COMMERCIAL

## 2017-12-28 ENCOUNTER — APPOINTMENT (OUTPATIENT)
Dept: PHYSICAL THERAPY | Facility: HOSPITAL | Age: 52
End: 2017-12-28
Attending: ORTHOPAEDIC SURGERY
Payer: COMMERCIAL

## 2018-01-02 ENCOUNTER — APPOINTMENT (OUTPATIENT)
Dept: PHYSICAL THERAPY | Facility: HOSPITAL | Age: 53
End: 2018-01-02
Attending: ORTHOPAEDIC SURGERY
Payer: COMMERCIAL

## 2018-01-04 ENCOUNTER — APPOINTMENT (OUTPATIENT)
Dept: PHYSICAL THERAPY | Facility: HOSPITAL | Age: 53
End: 2018-01-04
Attending: ORTHOPAEDIC SURGERY
Payer: COMMERCIAL

## 2018-01-18 ENCOUNTER — APPOINTMENT (OUTPATIENT)
Dept: PHYSICAL THERAPY | Facility: HOSPITAL | Age: 53
End: 2018-01-18
Attending: INTERNAL MEDICINE
Payer: COMMERCIAL

## 2018-02-02 DIAGNOSIS — M54.2 NECK PAIN: ICD-10-CM

## 2018-02-03 RX ORDER — MELOXICAM 15 MG/1
TABLET ORAL
Qty: 30 TABLET | Refills: 1 | Status: SHIPPED | OUTPATIENT
Start: 2018-02-03 | End: 2018-05-31

## 2018-02-03 RX ORDER — TIZANIDINE 2 MG/1
TABLET ORAL
Qty: 30 TABLET | Refills: 3 | Status: SHIPPED | OUTPATIENT
Start: 2018-02-03 | End: 2019-04-11

## 2018-05-31 DIAGNOSIS — M54.2 NECK PAIN: ICD-10-CM

## 2018-06-01 RX ORDER — MELOXICAM 15 MG/1
TABLET ORAL
Qty: 30 TABLET | Refills: 5 | Status: SHIPPED | OUTPATIENT
Start: 2018-06-01 | End: 2019-04-11

## 2018-06-01 NOTE — TELEPHONE ENCOUNTER
Refill Protocol Appointment Criteria  · Appointment scheduled in the past 6 months or in the next 3 months  Recent Outpatient Visits            5 months ago Clogged ear, left    [facility] for [facility]

## 2018-07-05 ENCOUNTER — HOSPITAL ENCOUNTER (EMERGENCY)
Facility: HOSPITAL | Age: 53
Discharge: HOME | End: 2018-07-05
Attending: EMERGENCY MEDICINE
Payer: COMMERCIAL

## 2018-07-05 ENCOUNTER — APPOINTMENT (OUTPATIENT)
Dept: CT IMAGING | Facility: HOSPITAL | Age: 53
End: 2018-07-05
Attending: EMERGENCY MEDICINE
Payer: COMMERCIAL

## 2018-07-05 ENCOUNTER — NURSE TRIAGE (OUTPATIENT)
Dept: INTERNAL MEDICINE CLINIC | Facility: CLINIC | Age: 53
End: 2018-07-05

## 2018-07-05 VITALS
BODY MASS INDEX: 32.11 KG/M2 | TEMPERATURE: 98 F | WEIGHT: 181.19 LBS | DIASTOLIC BLOOD PRESSURE: 78 MMHG | OXYGEN SATURATION: 96 % | HEART RATE: 69 BPM | SYSTOLIC BLOOD PRESSURE: 158 MMHG | RESPIRATION RATE: 16 BRPM | HEIGHT: 63 IN

## 2018-07-05 DIAGNOSIS — R42 VERTIGO: ICD-10-CM

## 2018-07-05 DIAGNOSIS — N30.01 ACUTE CYSTITIS WITH HEMATURIA: Primary | ICD-10-CM

## 2018-07-05 LAB
ANION GAP SERPL CALC-SCNC: 8 MMOL/L (ref 0–18)
BACTERIA UR QL AUTO: NEGATIVE /HPF
BASOPHILS # BLD: 0 K/UL (ref 0–0.2)
BASOPHILS NFR BLD: 1 %
BILIRUB UR QL: NEGATIVE
BUN SERPL-MCNC: 11 MG/DL (ref 8–20)
BUN/CREAT SERPL: 13.6 (ref 10–20)
CALCIUM SERPL-MCNC: 9.7 MG/DL (ref 8.5–10.5)
CHLORIDE SERPL-SCNC: 100 MMOL/L (ref 95–110)
CO2 SERPL-SCNC: 31 MMOL/L (ref 22–32)
COLOR UR: YELLOW
CREAT SERPL-MCNC: 0.81 MG/DL (ref 0.5–1.5)
EOSINOPHIL # BLD: 0.1 K/UL (ref 0–0.7)
EOSINOPHIL NFR BLD: 1 %
ERYTHROCYTE [DISTWIDTH] IN BLOOD BY AUTOMATED COUNT: 14.8 % (ref 11–15)
GLUCOSE SERPL-MCNC: 144 MG/DL (ref 70–99)
GLUCOSE UR-MCNC: NEGATIVE MG/DL
HCT VFR BLD AUTO: 39.9 % (ref 35–48)
HGB BLD-MCNC: 13.2 G/DL (ref 12–16)
KETONES UR-MCNC: NEGATIVE MG/DL
LYMPHOCYTES # BLD: 1.4 K/UL (ref 1–4)
LYMPHOCYTES NFR BLD: 23 %
MCH RBC QN AUTO: 28.3 PG (ref 27–32)
MCHC RBC AUTO-ENTMCNC: 33.1 G/DL (ref 32–37)
MCV RBC AUTO: 85.6 FL (ref 80–100)
MONOCYTES # BLD: 0.3 K/UL (ref 0–1)
MONOCYTES NFR BLD: 5 %
NEUTROPHILS # BLD AUTO: 4.5 K/UL (ref 1.8–7.7)
NEUTROPHILS NFR BLD: 71 %
NITRITE UR QL STRIP.AUTO: NEGATIVE
OSMOLALITY UR CALC.SUM OF ELEC: 290 MOSM/KG (ref 275–295)
PH UR: 5 [PH] (ref 5–8)
PLATELET # BLD AUTO: 299 K/UL (ref 140–400)
PMV BLD AUTO: 9.9 FL (ref 7.4–10.3)
POTASSIUM SERPL-SCNC: 3.1 MMOL/L (ref 3.3–5.1)
PROT UR-MCNC: NEGATIVE MG/DL
RBC # BLD AUTO: 4.66 M/UL (ref 3.7–5.4)
RBC #/AREA URNS AUTO: 21 /HPF
SODIUM SERPL-SCNC: 139 MMOL/L (ref 136–144)
SP GR UR STRIP: 1.03 (ref 1–1.03)
UROBILINOGEN UR STRIP-ACNC: <2
VIT C UR-MCNC: 40 MG/DL
WBC # BLD AUTO: 6.3 K/UL (ref 4–11)
WBC #/AREA URNS AUTO: 16 /HPF

## 2018-07-05 PROCEDURE — 80048 BASIC METABOLIC PNL TOTAL CA: CPT | Performed by: EMERGENCY MEDICINE

## 2018-07-05 PROCEDURE — 93010 ELECTROCARDIOGRAM REPORT: CPT | Performed by: EMERGENCY MEDICINE

## 2018-07-05 PROCEDURE — 81001 URINALYSIS AUTO W/SCOPE: CPT | Performed by: EMERGENCY MEDICINE

## 2018-07-05 PROCEDURE — 96361 HYDRATE IV INFUSION ADD-ON: CPT

## 2018-07-05 PROCEDURE — 74177 CT ABD & PELVIS W/CONTRAST: CPT | Performed by: EMERGENCY MEDICINE

## 2018-07-05 PROCEDURE — 99285 EMERGENCY DEPT VISIT HI MDM: CPT

## 2018-07-05 PROCEDURE — 70450 CT HEAD/BRAIN W/O DYE: CPT | Performed by: EMERGENCY MEDICINE

## 2018-07-05 PROCEDURE — 87086 URINE CULTURE/COLONY COUNT: CPT | Performed by: EMERGENCY MEDICINE

## 2018-07-05 PROCEDURE — 93005 ELECTROCARDIOGRAM TRACING: CPT

## 2018-07-05 PROCEDURE — 85025 COMPLETE CBC W/AUTO DIFF WBC: CPT

## 2018-07-05 PROCEDURE — 85025 COMPLETE CBC W/AUTO DIFF WBC: CPT | Performed by: EMERGENCY MEDICINE

## 2018-07-05 PROCEDURE — 96374 THER/PROPH/DIAG INJ IV PUSH: CPT

## 2018-07-05 PROCEDURE — 80048 BASIC METABOLIC PNL TOTAL CA: CPT

## 2018-07-05 RX ORDER — MECLIZINE HYDROCHLORIDE 25 MG/1
25 TABLET ORAL 3 TIMES DAILY PRN
Qty: 20 TABLET | Refills: 0 | Status: SHIPPED | OUTPATIENT
Start: 2018-07-05 | End: 2018-07-12

## 2018-07-05 RX ORDER — ONDANSETRON 4 MG/1
4 TABLET, ORALLY DISINTEGRATING ORAL EVERY 8 HOURS PRN
Qty: 10 TABLET | Refills: 0 | Status: SHIPPED | OUTPATIENT
Start: 2018-07-05 | End: 2018-07-12

## 2018-07-05 RX ORDER — ARIPIPRAZOLE 15 MG/1
40 TABLET ORAL ONCE
Status: COMPLETED | OUTPATIENT
Start: 2018-07-05 | End: 2018-07-05

## 2018-07-05 RX ORDER — MECLIZINE HYDROCHLORIDE 25 MG/1
25 TABLET ORAL ONCE
Status: COMPLETED | OUTPATIENT
Start: 2018-07-05 | End: 2018-07-05

## 2018-07-05 RX ORDER — ONDANSETRON 2 MG/ML
4 INJECTION INTRAMUSCULAR; INTRAVENOUS ONCE
Status: COMPLETED | OUTPATIENT
Start: 2018-07-05 | End: 2018-07-05

## 2018-07-05 RX ORDER — CEPHALEXIN 500 MG/1
500 CAPSULE ORAL 2 TIMES DAILY
Qty: 14 CAPSULE | Refills: 0 | Status: SHIPPED | OUTPATIENT
Start: 2018-07-05 | End: 2018-07-12 | Stop reason: ALTCHOICE

## 2018-07-05 NOTE — TELEPHONE ENCOUNTER
Communicated with Dr. Idania Chance, she prefers that pt go to ER for evaluation but pt continues to decline. She will get up, urinate, and try to drink some fluids. She states that the dizziness seems to be a little better so far today.

## 2018-07-05 NOTE — TELEPHONE ENCOUNTER
Action Requested: Summary for Provider     []  Critical Lab, Recommendations Needed  [x] Need Additional Advice  []   FYI    []   Need Orders  [] Need Medications Sent to Pharmacy  []  Other     SUMMARY: Pt was advised to go to ER.  She declines, would rath

## 2018-07-05 NOTE — ED NOTES
Patient arrives with complaints of dizziness, nausea and vomiting x 1 day. Patient states she has also been vaginally spotting since 06/30/18. Patient denies fever or diarrhea. States that she has not been able to hold anything down.  Complains of bloating

## 2018-07-08 NOTE — ED PROVIDER NOTES
Patient Seen in: [facility] Emergency Department    History   Patient presents with:  Dizziness (neurologic)  Nausea/Vomiting/Diarrhea (gastrointestinal)    Stated Complaint: Dizziness    HPI    47year old female with pmh anemia, GERD, HTN who pr above.    Physical Exam   ED Triage Vitals [07/05/18 1247]  BP: (!) 164/101  Pulse: 78  Resp: 18  Temp: 97.5 °F (36.4 °C)  Temp src: Temporal  SpO2: 99 %  O2 Device: None (Room air)    Current:/78   Pulse 69   Temp 97.7 °F (36.5 °C) (Oral)   Resp 16 note and vitals reviewed.         ED Course     Labs Reviewed   URINALYSIS WITH CULTURE REFLEX - Abnormal; Notable for the following:        Result Value    Clarity Urine Hazy (*)     Blood Urine Small (*)     Leukocyte Esterase Urine Small (*)     Ascorbic 7/5/2018  CONCLUSION:  1. No acute intra-abdominal process is identified. The etiology of the patient's symptoms is unclear from this study. 2. Subserosal and intramural uterine fibroids are noted.   3. Hepatomegaly, perhaps with underlying hepatic steatos Cap  Take 1 capsule (500 mg total) by mouth 2 (two) times daily. , Print Script, Disp-14 capsule, R-0    Meclizine HCl 25 MG Oral Tab  Take 1 tablet (25 mg total) by mouth 3 (three) times daily as needed for Dizziness. , Print Script, Disp-20 tablet, R-0

## 2018-07-12 ENCOUNTER — OFFICE VISIT (OUTPATIENT)
Dept: INTERNAL MEDICINE CLINIC | Facility: CLINIC | Age: 53
End: 2018-07-12

## 2018-07-12 VITALS
DIASTOLIC BLOOD PRESSURE: 77 MMHG | SYSTOLIC BLOOD PRESSURE: 122 MMHG | HEIGHT: 64 IN | HEART RATE: 66 BPM | WEIGHT: 182 LBS | BODY MASS INDEX: 31.07 KG/M2

## 2018-07-12 DIAGNOSIS — J30.1 SEASONAL ALLERGIC RHINITIS DUE TO POLLEN: ICD-10-CM

## 2018-07-12 DIAGNOSIS — D25.2 INTRAMURAL AND SUBSEROUS LEIOMYOMA OF UTERUS: ICD-10-CM

## 2018-07-12 DIAGNOSIS — R42 VERTIGO: Primary | ICD-10-CM

## 2018-07-12 DIAGNOSIS — D25.1 INTRAMURAL AND SUBSEROUS LEIOMYOMA OF UTERUS: ICD-10-CM

## 2018-07-12 DIAGNOSIS — N30.01 ACUTE CYSTITIS WITH HEMATURIA: ICD-10-CM

## 2018-07-12 PROCEDURE — 99212 OFFICE O/P EST SF 10 MIN: CPT | Performed by: INTERNAL MEDICINE

## 2018-07-12 PROCEDURE — 99214 OFFICE O/P EST MOD 30 MIN: CPT | Performed by: INTERNAL MEDICINE

## 2018-07-12 RX ORDER — MECLIZINE HYDROCHLORIDE 25 MG/1
25 TABLET ORAL 3 TIMES DAILY PRN
Qty: 20 TABLET | Refills: 2 | Status: SHIPPED | OUTPATIENT
Start: 2018-07-12 | End: 2019-04-11

## 2018-07-12 RX ORDER — FLUTICASONE PROPIONATE 50 MCG
SPRAY, SUSPENSION (ML) NASAL
Qty: 3 BOTTLE | Refills: 1 | Status: SHIPPED | OUTPATIENT
Start: 2018-07-12 | End: 2019-02-22

## 2018-07-12 NOTE — ASSESSMENT & PLAN NOTE
Recent episode of vertigo, ER evaluation reviewed– CT brain normal.  Symptoms have improved significantly. May use meclizine 25 mg–half to 1 tablet 1-2 times a day as needed.   This may cause drowsiness and hence should not drive after taking the General acute hospital

## 2018-07-12 NOTE — PATIENT INSTRUCTIONS
Problem List Items Addressed This Visit        Unprioritized    Acute cystitis with hematuria     Patient has been treated with Keflex per ER evaluation. She did have some hematuria.   Urine culture however did not show any infection– may discontinue antib

## 2018-07-12 NOTE — ASSESSMENT & PLAN NOTE
Nasal congestion, postnasal drip, eustachian tube dysfunction most likely contributory to her current problems with vertigo. Continue on Claritin as directed. Flonase 1 puff each nostril 2 times daily as directed. Call if symptoms are not better.

## 2018-07-12 NOTE — ASSESSMENT & PLAN NOTE
Pelvic pain with ultrasound showing multiple uterine fibroids which seem larger than the one seen on the ultrasound a few months back. Patient has been referred to gynecology for an evaluation of pelvic pain in the fibroids.   She has been menopausal and h

## 2018-07-12 NOTE — PROGRESS NOTES
HPI:    Patient ID: Brian Elena is a 47year old female. Ct abdomen    FINDINGS:  LUNG BASES:  The heart is normal in size. There is dependent subsegmental atelectasis bilaterally. LIVER:  The liver is enlarged, measuring 22.2 cm craniocaudally. been resolved (Patient has finished her Keflex. Urine cultures–no growth, UA with hematuria. ). Associated symptoms include abdominal pain and urinary symptoms. Associated symptoms comments: Right low back pain. Nothing aggravates the symptoms.  Treatments Musculoskeletal: Positive for back pain. Skin: Negative. Allergic/Immunologic: Negative. Neurological: Positive for dizziness. Hematological: Negative. Psychiatric/Behavioral: Negative.                Current Outpatient Prescriptions:  Flutic APPLICATION   2 TIMES A WEEK Disp:  Rfl:      Allergies:  Lisinopril              Coughing  Rash Away  [Sensi-C*    RASH  Shrimp                  ANAPHYLAXIS  Sulfa Antibiotics       RASH, SWELLING, SHORTNESS OF BREATH      07/12/18  1437   BP: 122/77   Pu Recent episode of vertigo, ER evaluation reviewed– CT brain normal.  Symptoms have improved significantly. May use meclizine 25 mg–half to 1 tablet 1-2 times a day as needed.   This may cause drowsiness and hence should not drive after taking the [facility] IV) EM ([id number])       [id number]

## 2018-07-14 ENCOUNTER — LAB ENCOUNTER (OUTPATIENT)
Dept: LAB | Age: 53
End: 2018-07-14
Attending: INTERNAL MEDICINE
Payer: COMMERCIAL

## 2018-07-14 DIAGNOSIS — E11.9 TYPE 2 DIABETES MELLITUS WITHOUT COMPLICATION, WITHOUT LONG-TERM CURRENT USE OF INSULIN (HCC): ICD-10-CM

## 2018-07-14 DIAGNOSIS — I10 ESSENTIAL HYPERTENSION: ICD-10-CM

## 2018-07-14 LAB
ALBUMIN SERPL BCP-MCNC: 3.9 G/DL (ref 3.5–4.8)
ALBUMIN/GLOB SERPL: 1 {RATIO} (ref 1–2)
ALP SERPL-CCNC: 69 U/L (ref 32–100)
ALT SERPL-CCNC: 12 U/L (ref 14–54)
ANION GAP SERPL CALC-SCNC: 7 MMOL/L (ref 0–18)
AST SERPL-CCNC: 15 U/L (ref 15–41)
BASOPHILS # BLD: 0 K/UL (ref 0–0.2)
BASOPHILS NFR BLD: 1 %
BILIRUB SERPL-MCNC: 0.9 MG/DL (ref 0.3–1.2)
BUN SERPL-MCNC: 12 MG/DL (ref 8–20)
BUN/CREAT SERPL: 15.4 (ref 10–20)
CALCIUM SERPL-MCNC: 9.2 MG/DL (ref 8.5–10.5)
CHLORIDE SERPL-SCNC: 100 MMOL/L (ref 95–110)
CHOLEST SERPL-MCNC: 213 MG/DL (ref 110–200)
CO2 SERPL-SCNC: 31 MMOL/L (ref 22–32)
CREAT SERPL-MCNC: 0.78 MG/DL (ref 0.5–1.5)
EOSINOPHIL # BLD: 0.1 K/UL (ref 0–0.7)
EOSINOPHIL NFR BLD: 2 %
ERYTHROCYTE [DISTWIDTH] IN BLOOD BY AUTOMATED COUNT: 14.8 % (ref 11–15)
GLOBULIN PLAS-MCNC: 3.8 G/DL (ref 2.5–3.7)
GLUCOSE SERPL-MCNC: 120 MG/DL (ref 70–99)
HCT VFR BLD AUTO: 37.5 % (ref 35–48)
HDLC SERPL-MCNC: 59 MG/DL
HGB BLD-MCNC: 12.3 G/DL (ref 12–16)
LDLC SERPL CALC-MCNC: 136 MG/DL (ref 0–99)
LYMPHOCYTES # BLD: 1.8 K/UL (ref 1–4)
LYMPHOCYTES NFR BLD: 35 %
MCH RBC QN AUTO: 28.9 PG (ref 27–32)
MCHC RBC AUTO-ENTMCNC: 32.8 G/DL (ref 32–37)
MCV RBC AUTO: 88 FL (ref 80–100)
MONOCYTES # BLD: 0.3 K/UL (ref 0–1)
MONOCYTES NFR BLD: 6 %
NEUTROPHILS # BLD AUTO: 2.9 K/UL (ref 1.8–7.7)
NEUTROPHILS NFR BLD: 56 %
NONHDLC SERPL-MCNC: 154 MG/DL
OSMOLALITY UR CALC.SUM OF ELEC: 287 MOSM/KG (ref 275–295)
PATIENT FASTING: YES
PLATELET # BLD AUTO: 304 K/UL (ref 140–400)
PMV BLD AUTO: 10.4 FL (ref 7.4–10.3)
POTASSIUM SERPL-SCNC: 3.2 MMOL/L (ref 3.3–5.1)
PROT SERPL-MCNC: 7.7 G/DL (ref 5.9–8.4)
RBC # BLD AUTO: 4.26 M/UL (ref 3.7–5.4)
SODIUM SERPL-SCNC: 138 MMOL/L (ref 136–144)
TRIGL SERPL-MCNC: 90 MG/DL (ref 1–149)
WBC # BLD AUTO: 5.2 K/UL (ref 4–11)

## 2018-07-14 PROCEDURE — 36415 COLL VENOUS BLD VENIPUNCTURE: CPT

## 2018-07-14 PROCEDURE — 85025 COMPLETE CBC W/AUTO DIFF WBC: CPT

## 2018-07-14 PROCEDURE — 80053 COMPREHEN METABOLIC PANEL: CPT

## 2018-07-14 PROCEDURE — 80061 LIPID PANEL: CPT

## 2018-07-14 PROCEDURE — 83036 HEMOGLOBIN GLYCOSYLATED A1C: CPT

## 2018-07-15 LAB — HBA1C MFR BLD: 6.1 % (ref 4–6)

## 2018-07-30 ENCOUNTER — OFFICE VISIT (OUTPATIENT)
Dept: OBGYN CLINIC | Facility: CLINIC | Age: 53
End: 2018-07-30
Payer: COMMERCIAL

## 2018-07-30 VITALS
BODY MASS INDEX: 32 KG/M2 | HEART RATE: 64 BPM | WEIGHT: 184 LBS | DIASTOLIC BLOOD PRESSURE: 82 MMHG | SYSTOLIC BLOOD PRESSURE: 134 MMHG

## 2018-07-30 DIAGNOSIS — N64.4 BREAST TENDERNESS: ICD-10-CM

## 2018-07-30 DIAGNOSIS — R10.2 PELVIC PAIN: Primary | ICD-10-CM

## 2018-07-30 PROCEDURE — 99203 OFFICE O/P NEW LOW 30 MIN: CPT | Performed by: OBSTETRICS & GYNECOLOGY

## 2018-07-30 NOTE — PROGRESS NOTES
Noris Chin is a 47year old female  No LMP recorded. Patient is not currently having periods (Reason: Perimenopausal). Patient presents with:  Gyn Exam: Referred by PCP, pt says that she was told has Fibroids and to get them checked out.  Pt als hypertension          SURGICAL HISTORY:  Past Surgical History:  No date: KELSI NEEDLE LOCALIZATION W/ SPECIMEN 1 SITE LEFT  No date: KELSI NEEDLE LOCALIZATION W/ SPECIMEN 1 SITE RIG*  No date: MASTECTOMY RIGHT  No date: NEEDLE BIOPSY LEFT  No date:   No d MG Oral Tablet 24 Hr, Take 1 tablet (500 mg total) by mouth daily. , Disp: 90 tablet, Rfl: 1  •  Clindamycin Phosphate 1 % External Gel, Apply 1 Application topically as needed.  apply by TOPICAL route 2 times every day a thin film to affected area(s), Disp: motion  Uterus: normal in size, contour, position, mobility, without tenderness  Adnexa: normal without masses or tenderness  Perineum: normal      Assessment & Plan:  Kecia Perez was seen today for gyn exam.    Diagnoses and all orders for this visit:    Pelv

## 2018-08-24 RX ORDER — PANTOPRAZOLE SODIUM 40 MG/1
TABLET, DELAYED RELEASE ORAL
Qty: 180 TABLET | Refills: 1 | Status: SHIPPED | OUTPATIENT
Start: 2018-08-24 | End: 2019-04-11

## 2018-11-15 NOTE — TELEPHONE ENCOUNTER
Hypertensive Medications  Protocol Criteria:  · Appointment scheduled in the past 6 months or in the next 3 months  · BMP or CMP in the past 12 months  · Creatinine result < 2  Recent Outpatient Visits            3 months ago Pelvic pain    [facility]

## 2018-11-16 RX ORDER — CARVEDILOL 6.25 MG/1
TABLET ORAL
Qty: 180 TABLET | Refills: 1 | Status: SHIPPED | OUTPATIENT
Start: 2018-11-16 | End: 2019-02-25

## 2018-11-16 RX ORDER — LOSARTAN POTASSIUM AND HYDROCHLOROTHIAZIDE 25; 100 MG/1; MG/1
TABLET ORAL
Qty: 90 TABLET | Refills: 1 | Status: SHIPPED | OUTPATIENT
Start: 2018-11-16 | End: 2019-04-11

## 2018-11-27 ENCOUNTER — HOSPITAL ENCOUNTER (OUTPATIENT)
Dept: MAMMOGRAPHY | Facility: HOSPITAL | Age: 53
Discharge: HOME | End: 2018-11-27
Attending: OBSTETRICS & GYNECOLOGY
Payer: COMMERCIAL

## 2018-11-27 ENCOUNTER — HOSPITAL ENCOUNTER (OUTPATIENT)
Dept: ULTRASOUND IMAGING | Facility: HOSPITAL | Age: 53
Discharge: HOME | End: 2018-11-27
Attending: OBSTETRICS & GYNECOLOGY
Payer: COMMERCIAL

## 2018-11-27 DIAGNOSIS — R10.2 PELVIC PAIN: ICD-10-CM

## 2018-11-27 DIAGNOSIS — N64.4 BREAST TENDERNESS: ICD-10-CM

## 2018-11-27 PROCEDURE — 77066 DX MAMMO INCL CAD BI: CPT | Performed by: OBSTETRICS & GYNECOLOGY

## 2018-11-27 PROCEDURE — 76830 TRANSVAGINAL US NON-OB: CPT | Performed by: OBSTETRICS & GYNECOLOGY

## 2018-11-27 PROCEDURE — 77062 BREAST TOMOSYNTHESIS BI: CPT | Performed by: OBSTETRICS & GYNECOLOGY

## 2018-11-27 PROCEDURE — 76856 US EXAM PELVIC COMPLETE: CPT | Performed by: OBSTETRICS & GYNECOLOGY

## 2018-12-07 ENCOUNTER — TELEPHONE (OUTPATIENT)
Dept: OBGYN CLINIC | Facility: CLINIC | Age: 53
End: 2018-12-07

## 2018-12-07 NOTE — TELEPHONE ENCOUNTER
Informed pt of results and KCB recs below. Assisted pt with scheduling appt on 12/13/18 at 10am with [facility]. Advised pt to take 600mg of Ibuprofen with food 30-60 minutes before appt time Pt verbalized understanding.

## 2018-12-07 NOTE — TELEPHONE ENCOUNTER
----- Message from Shirlie Baumgarten, MD sent at 12/4/2018  9:17 AM CST -----  Please let patient know that her endometrial thickness was slightly thicker than we would want and she will need EMB. I will review rest of US at her apt.  But everything else look

## 2018-12-12 ENCOUNTER — OFFICE VISIT (OUTPATIENT)
Dept: ORTHOPEDICS CLINIC | Facility: CLINIC | Age: 53
End: 2018-12-12
Payer: COMMERCIAL

## 2018-12-12 ENCOUNTER — HOSPITAL ENCOUNTER (OUTPATIENT)
Dept: GENERAL RADIOLOGY | Facility: HOSPITAL | Age: 53
Discharge: HOME | End: 2018-12-12
Attending: ORTHOPAEDIC SURGERY
Payer: COMMERCIAL

## 2018-12-12 VITALS — DIASTOLIC BLOOD PRESSURE: 73 MMHG | HEART RATE: 65 BPM | SYSTOLIC BLOOD PRESSURE: 131 MMHG | RESPIRATION RATE: 20 BRPM

## 2018-12-12 DIAGNOSIS — M25.552 PAIN OF BOTH HIP JOINTS: ICD-10-CM

## 2018-12-12 DIAGNOSIS — M25.551 PAIN OF BOTH HIP JOINTS: ICD-10-CM

## 2018-12-12 DIAGNOSIS — M54.50 ACUTE LEFT-SIDED LOW BACK PAIN WITHOUT SCIATICA: Primary | ICD-10-CM

## 2018-12-12 DIAGNOSIS — M17.12 PRIMARY LOCALIZED OSTEOARTHROSIS OF LEFT LOWER LEG: ICD-10-CM

## 2018-12-12 PROCEDURE — 72110 X-RAY EXAM L-2 SPINE 4/>VWS: CPT | Performed by: ORTHOPAEDIC SURGERY

## 2018-12-12 PROCEDURE — 73522 X-RAY EXAM HIPS BI 3-4 VIEWS: CPT | Performed by: ORTHOPAEDIC SURGERY

## 2018-12-12 PROCEDURE — 20610 DRAIN/INJ JOINT/BURSA W/O US: CPT | Performed by: ORTHOPAEDIC SURGERY

## 2018-12-12 PROCEDURE — 99213 OFFICE O/P EST LOW 20 MIN: CPT | Performed by: ORTHOPAEDIC SURGERY

## 2018-12-12 PROCEDURE — 99212 OFFICE O/P EST SF 10 MIN: CPT | Performed by: ORTHOPAEDIC SURGERY

## 2018-12-12 NOTE — H&P
NURSING INTAKE COMMENTS: Patient presents with:  Hip Pain: bilateral -- Left hip is worse. States pain radiates to groin, lateral hip, and left buttock. No xrays taken of hip. Left hip onset about 7-8 weeks.  Christal Horne on right side about 2.5 mths ago and now fe ONCE DAILY Disp: 30 tablet Rfl: 5   TIZANIDINE HCL 2 MG Oral Tab TAKE ONE TABLET BY MOUTH NIGHTLY Disp: 30 tablet Rfl: 3   Fluocinolone Acetonide Scalp 0.01 % External Oil External application as directed .  Disp: 118 mL Rfl: 2   MetFORMIN HCl  MG Ora Transportation needs - non-medical: Not on file    Occupational History      Not on file    Tobacco Use      Smoking status: Never Smoker      Smokeless tobacco: Never Used    Substance and Sexual Activity      Alcohol use: No        Alcohol/week: 0.0 oz the lumbar spine show posterior element arthritis but no significant disc space narrowing or foramen narrowing    Lenin was seen today for hip pain.     Diagnoses and all orders for this visit:    Acute left-sided low back pain without sciatica    Pain of

## 2018-12-12 NOTE — PROGRESS NOTES
Per verbal order from [facility], draw up 3ml of Kenalog 10 and 3ml of 1% lidocaine for cortisone injection to left knee.   Lyndsey Aguirre RN

## 2018-12-13 ENCOUNTER — OFFICE VISIT (OUTPATIENT)
Dept: OBGYN CLINIC | Facility: CLINIC | Age: 53
End: 2018-12-13
Payer: COMMERCIAL

## 2018-12-13 ENCOUNTER — TELEPHONE (OUTPATIENT)
Dept: INTERNAL MEDICINE CLINIC | Facility: CLINIC | Age: 53
End: 2018-12-13

## 2018-12-13 VITALS
SYSTOLIC BLOOD PRESSURE: 122 MMHG | BODY MASS INDEX: 32 KG/M2 | HEART RATE: 77 BPM | DIASTOLIC BLOOD PRESSURE: 73 MMHG | WEIGHT: 185.63 LBS

## 2018-12-13 DIAGNOSIS — R93.89 THICKENED ENDOMETRIUM: Primary | ICD-10-CM

## 2018-12-13 DIAGNOSIS — Z78.0 POST-MENOPAUSAL: Primary | ICD-10-CM

## 2018-12-13 DIAGNOSIS — N88.2 CERVICAL STENOSIS (UTERINE CERVIX): ICD-10-CM

## 2018-12-13 PROCEDURE — 58120 DILATION AND CURETTAGE: CPT | Performed by: OBSTETRICS & GYNECOLOGY

## 2018-12-13 NOTE — TELEPHONE ENCOUNTER
ANGELIQUE - please advise if you would like to generate labs and DEXA prior to upcoming physical on 01/03/19.    LOV - 07/12/2018  Last labs - 07/14/18

## 2018-12-13 NOTE — PROCEDURES
Endometrial Biopsy     Pre-Procedure Care:   Consent was obtained. Procedure/risks were explained. Questions were answered. Correct patient was identified. Correct side and site were confirmed. Indication:  Thickened endometrium     Pre-Medications:

## 2018-12-13 NOTE — TELEPHONE ENCOUNTER
PATIENT HAS AN APPT ON JAN 3RD. SHE IS ASKING FOR A LAB ORDER AND BONE DENSITY TEST PRIOR TO THIS VISIT.

## 2018-12-18 ENCOUNTER — TELEPHONE (OUTPATIENT)
Dept: OBGYN CLINIC | Facility: CLINIC | Age: 53
End: 2018-12-18

## 2018-12-18 NOTE — TELEPHONE ENCOUNTER
I called pt. And she has been post menopausal for 2 yrs. I put a DEXA order in.  Pt. Has been notified and does go to [facility].

## 2018-12-18 NOTE — TELEPHONE ENCOUNTER
----- Message from Hallie Salguero MD sent at 12/15/2018  9:32 AM CST -----  Please let patient know that her EMB was negative

## 2018-12-18 NOTE — TELEPHONE ENCOUNTER
Orders for labs placed. DEXA scan can be done only if menopausal for greater than 1 year.   Please check if she is menopausal.  Please check if she wants to do the labs at [facility] as a lab orders are placed for [place].  Please change to [facility] if that is

## 2019-01-02 ENCOUNTER — MED REC SCAN ONLY (OUTPATIENT)
Dept: INTERNAL MEDICINE CLINIC | Facility: CLINIC | Age: 54
End: 2019-01-02

## 2019-01-03 ENCOUNTER — LAB ENCOUNTER (OUTPATIENT)
Dept: LAB | Facility: HOSPITAL | Age: 54
End: 2019-01-03
Attending: INTERNAL MEDICINE
Payer: COMMERCIAL

## 2019-01-03 ENCOUNTER — OFFICE VISIT (OUTPATIENT)
Dept: INTERNAL MEDICINE CLINIC | Facility: CLINIC | Age: 54
End: 2019-01-03
Payer: COMMERCIAL

## 2019-01-03 VITALS
TEMPERATURE: 98 F | HEART RATE: 66 BPM | HEIGHT: 63 IN | RESPIRATION RATE: 20 BRPM | BODY MASS INDEX: 32 KG/M2 | WEIGHT: 180.63 LBS | DIASTOLIC BLOOD PRESSURE: 84 MMHG | SYSTOLIC BLOOD PRESSURE: 136 MMHG

## 2019-01-03 DIAGNOSIS — D25.2 INTRAMURAL AND SUBSEROUS LEIOMYOMA OF UTERUS: ICD-10-CM

## 2019-01-03 DIAGNOSIS — J30.1 SEASONAL ALLERGIC RHINITIS DUE TO POLLEN: ICD-10-CM

## 2019-01-03 DIAGNOSIS — E11.9 TYPE 2 DIABETES MELLITUS WITHOUT COMPLICATION, WITHOUT LONG-TERM CURRENT USE OF INSULIN (HCC): ICD-10-CM

## 2019-01-03 DIAGNOSIS — R00.2 PALPITATIONS: ICD-10-CM

## 2019-01-03 DIAGNOSIS — E55.9 VITAMIN D DEFICIENCY: ICD-10-CM

## 2019-01-03 DIAGNOSIS — Z23 NEED FOR VACCINATION: ICD-10-CM

## 2019-01-03 DIAGNOSIS — I10 ESSENTIAL HYPERTENSION: Primary | ICD-10-CM

## 2019-01-03 DIAGNOSIS — Z00.00 ROUTINE PHYSICAL EXAMINATION: ICD-10-CM

## 2019-01-03 DIAGNOSIS — D25.1 INTRAMURAL AND SUBSEROUS LEIOMYOMA OF UTERUS: ICD-10-CM

## 2019-01-03 DIAGNOSIS — I10 ESSENTIAL HYPERTENSION: ICD-10-CM

## 2019-01-03 LAB
ALBUMIN SERPL BCP-MCNC: 4 G/DL (ref 3.5–4.8)
ALBUMIN/GLOB SERPL: 1 {RATIO} (ref 1–2)
ALP SERPL-CCNC: 76 U/L (ref 32–100)
ALT SERPL-CCNC: 15 U/L (ref 14–54)
ANION GAP SERPL CALC-SCNC: 8 MMOL/L (ref 0–18)
AST SERPL-CCNC: 16 U/L (ref 15–41)
BASOPHILS # BLD: 0 K/UL (ref 0–0.2)
BASOPHILS NFR BLD: 1 %
BILIRUB SERPL-MCNC: 0.7 MG/DL (ref 0.3–1.2)
BILIRUB UR QL: NEGATIVE
BUN SERPL-MCNC: 10 MG/DL (ref 8–20)
BUN/CREAT SERPL: 13.2 (ref 10–20)
CALCIUM SERPL-MCNC: 9.5 MG/DL (ref 8.5–10.5)
CHLORIDE SERPL-SCNC: 99 MMOL/L (ref 95–110)
CHOLEST SERPL-MCNC: 233 MG/DL (ref 110–200)
CLARITY UR: CLEAR
CO2 SERPL-SCNC: 32 MMOL/L (ref 22–32)
COLOR UR: YELLOW
CREAT SERPL-MCNC: 0.76 MG/DL (ref 0.5–1.5)
EOSINOPHIL # BLD: 0.3 K/UL (ref 0–0.7)
EOSINOPHIL NFR BLD: 5 %
ERYTHROCYTE [DISTWIDTH] IN BLOOD BY AUTOMATED COUNT: 15.1 % (ref 11–15)
EST. AVERAGE GLUCOSE BLD GHB EST-MCNC: 134 MG/DL (ref 68–126)
GLOBULIN PLAS-MCNC: 4.2 G/DL (ref 2.5–3.7)
GLUCOSE SERPL-MCNC: 129 MG/DL (ref 70–99)
GLUCOSE UR-MCNC: NEGATIVE MG/DL
HBA1C MFR BLD HPLC: 6.3 % (ref ?–5.7)
HCT VFR BLD AUTO: 38.2 % (ref 35–48)
HDLC SERPL-MCNC: 72 MG/DL
HGB BLD-MCNC: 12.6 G/DL (ref 12–16)
HGB UR QL STRIP.AUTO: NEGATIVE
KETONES UR-MCNC: NEGATIVE MG/DL
LDLC SERPL CALC-MCNC: 132 MG/DL (ref 0–99)
LEUKOCYTE ESTERASE UR QL STRIP.AUTO: NEGATIVE
LYMPHOCYTES # BLD: 1.9 K/UL (ref 1–4)
LYMPHOCYTES NFR BLD: 32 %
MCH RBC QN AUTO: 28.7 PG (ref 27–32)
MCHC RBC AUTO-ENTMCNC: 33.1 G/DL (ref 32–37)
MCV RBC AUTO: 86.9 FL (ref 80–100)
MONOCYTES # BLD: 0.4 K/UL (ref 0–1)
MONOCYTES NFR BLD: 7 %
NEUTROPHILS # BLD AUTO: 3.3 K/UL (ref 1.8–7.7)
NEUTROPHILS NFR BLD: 56 %
NITRITE UR QL STRIP.AUTO: NEGATIVE
NONHDLC SERPL-MCNC: 161 MG/DL
OSMOLALITY UR CALC.SUM OF ELEC: 289 MOSM/KG (ref 275–295)
PATIENT FASTING: YES
PH UR: 7 [PH] (ref 5–8)
PLATELET # BLD AUTO: 317 K/UL (ref 140–400)
PMV BLD AUTO: 10.3 FL (ref 7.4–10.3)
POTASSIUM SERPL-SCNC: 3.3 MMOL/L (ref 3.3–5.1)
PROT SERPL-MCNC: 8.2 G/DL (ref 5.9–8.4)
PROT UR-MCNC: NEGATIVE MG/DL
RBC # BLD AUTO: 4.39 M/UL (ref 3.7–5.4)
RBC #/AREA URNS AUTO: 4 /HPF
SODIUM SERPL-SCNC: 139 MMOL/L (ref 136–144)
SP GR UR STRIP: 1.02 (ref 1–1.03)
TRIGL SERPL-MCNC: 146 MG/DL (ref 1–149)
TSH SERPL-ACNC: 3.19 UIU/ML (ref 0.45–5.33)
UROBILINOGEN UR STRIP-ACNC: <2
VIT B12 SERPL-MCNC: 428 PG/ML (ref 181–914)
VIT C UR-MCNC: 20 MG/DL
WBC # BLD AUTO: 6 K/UL (ref 4–11)
WBC #/AREA URNS AUTO: 1 /HPF

## 2019-01-03 PROCEDURE — 99396 PREV VISIT EST AGE 40-64: CPT | Performed by: INTERNAL MEDICINE

## 2019-01-03 PROCEDURE — 36415 COLL VENOUS BLD VENIPUNCTURE: CPT

## 2019-01-03 PROCEDURE — 84443 ASSAY THYROID STIM HORMONE: CPT

## 2019-01-03 PROCEDURE — 82306 VITAMIN D 25 HYDROXY: CPT

## 2019-01-03 PROCEDURE — 82607 VITAMIN B-12: CPT

## 2019-01-03 PROCEDURE — 80053 COMPREHEN METABOLIC PANEL: CPT

## 2019-01-03 PROCEDURE — 83036 HEMOGLOBIN GLYCOSYLATED A1C: CPT

## 2019-01-03 PROCEDURE — 85025 COMPLETE CBC W/AUTO DIFF WBC: CPT

## 2019-01-03 PROCEDURE — 80061 LIPID PANEL: CPT

## 2019-01-03 PROCEDURE — 81003 URINALYSIS AUTO W/O SCOPE: CPT

## 2019-01-03 NOTE — ASSESSMENT & PLAN NOTE
Recurrent episodes of nasal congestion, postnasal drip. Recently has had worsening symptoms usually when laying down. On examination has bilateral nasal turbinate hypertrophy.   She is currently on Claritin 10 mg daily and Flonase 1 puff each nostril twic

## 2019-01-03 NOTE — PATIENT INSTRUCTIONS
Problem List Items Addressed This Visit        Unprioritized    Hypertension - Primary     Blood pressure 136/84, pulse 66, temperature 97.9 °F (36.6 °C), temperature source Oral, resp.  rate 20, height 5' 3\" (1.6 m), weight 180 lb 9.6 oz (81.9 kg), not cu Annia Tai.   Immunizations-  Immunization History   Administered Date(s) Administered   • FLULAVAL 6 months & older 0.5 ml Prefilled syringe ([id number]) 10/19/2017   • Influenza 12/14/2010, 11/08/2011, 10/02/2012, 10/29/2013, 10/01/2015, 10/03/2016, 10/01/2018   •

## 2019-01-03 NOTE — PROGRESS NOTES
REASON FOR VISIT:    Anmol Yousif is a 46year old female who presents for an [address].     Pap Smear,3 Years due on 10/19/2020  Colonoscopy due on 2022  Mammogram due on 2019    Obstetric History     T0    L4    S hopeless (over the last two weeks)?: Not at all    PHQ-2 SCORE: 0        PREVENTATIVE SERVICES  INDICATIONS AND SCHEDULE Recommendation Internal Lab or Procedure External Lab or Procedure   Breast Cancer Screening   Every 2 yrs age 54-69 Mammogram due on 1 External Lab or Procedure   Annual Monitoring of Persistent     Medications (ACE/ARB, digoxin, diuretics)    Potassium  Annually Potassium (mmol/L)   Date Value   07/14/2018 3.2 (L)     POTASSIUM (P) (mmol/L)   Date Value   02/17/2016 3.8    No flowsheet d 3 (three) times daily as needed for Dizziness.  Disp: 20 tablet Rfl: 2   MELOXICAM 15 MG Oral Tab TAKE ONE TABLET BY MOUTH ONCE DAILY (Patient taking differently: TAKE 1/2 - 1 tablet BY MOUTH ONCE DAILY prn) Disp: 30 tablet Rfl: 5   TIZANIDINE HCL 2 MG Oral Glaucoma Neg       SOCIAL HISTORY:   Social History    Tobacco Use      Smoking status: Never Smoker      Smokeless tobacco: Never Used    Alcohol use: No      Alcohol/week: 0.0 oz    Drug use: No    ,3 children,stays at home  REVIEW OF SYSTEMS:   G RELEVANT CHRONIC CONDITIONS:   Marija Saunders is a 46year old female who presents for an [address].      Problem List Items Addressed This Visit        Unprioritized    Hypertension - Primary     Blood pressure 136/84, pulse 66, temperature orifices intact. Colonoscopy due on 04/14/2022  Pap and pelvic exams per gynecology–Dr. Annia Tai.   Immunizations-  Immunization History   Administered Date(s) Administered   • FLULAVAL 6 months & older 0.5 ml Prefilled syringe ([id number]) 10/19/2017   • Erica Desai 10/29/2013, 10/01/2015, 10/03/2016, 10/01/2018   • TDAP 09/20/2010       Influenza Annually   Pneumococcal if high risk   Td/Tdap once then every 10 years   HPV Females 11-26: 3 doses   Zoster (Shingles) 60 and older: one dose   Varicella 2 doses if not im

## 2019-01-03 NOTE — ASSESSMENT & PLAN NOTE
Normal exam.  Labs as ordered. Skin check normal.  No significant abnormal nevi. Breast exam completed–no palpable abnormalities, discharge from the nipples or axillary adenopathy.   Recent mammogram per gynecology reviewed–dense breasts category C but ot

## 2019-01-03 NOTE — ASSESSMENT & PLAN NOTE
Blood pressure 136/84, pulse 66, temperature 97.9 °F (36.6 °C), temperature source Oral, resp. rate 20, height 5' 3\" (1.6 m), weight 180 lb 9.6 oz (81.9 kg), not currently breastfeeding. Blood pressure looks stable at this time.   Slight elevation in [place]

## 2019-01-03 NOTE — ASSESSMENT & PLAN NOTE
Persistent pelvic pain with multiple uterine fibroids–this is being monitored per gynecology. Will follow-up for any further needs. She is currently postmenopausal without any significant bleeding or spotting since menopause.   It would naturally be expec

## 2019-01-03 NOTE — ASSESSMENT & PLAN NOTE
Intermittent palpitations– off-and-on symptomatic episodes. No lightheadedness or dizziness. She had been doing well on Coreg 6.25 mg twice daily but she feels she has had worsening symptoms recently.   Last echocardiogram and Holter monitor in 2011 compl

## 2019-01-03 NOTE — ASSESSMENT & PLAN NOTE
Patient has been on metformin 500 mg once daily at dinnertime–due for recheck labs which have been ordered, pending at this time. Last hemoglobin A1c was at 6.1. Diabetic eye exam–recommended, Dr. SCHAFER–[phone]. Pneumovax provided.

## 2019-01-04 LAB — 25(OH)D3 SERPL-MCNC: 28.2 NG/ML (ref 30–100)

## 2019-01-06 RX ORDER — ERGOCALCIFEROL 1.25 MG/1
50000 CAPSULE ORAL WEEKLY
Qty: 12 CAPSULE | Refills: 1 | Status: SHIPPED | OUTPATIENT
Start: 2019-01-06 | End: 2019-02-05

## 2019-01-11 ENCOUNTER — HOSPITAL ENCOUNTER (OUTPATIENT)
Dept: BONE DENSITY | Facility: HOSPITAL | Age: 54
Discharge: HOME | End: 2019-01-11
Attending: INTERNAL MEDICINE
Payer: COMMERCIAL

## 2019-01-11 DIAGNOSIS — Z78.0 POST-MENOPAUSAL: ICD-10-CM

## 2019-01-11 PROCEDURE — 77080 DXA BONE DENSITY AXIAL: CPT | Performed by: INTERNAL MEDICINE

## 2019-01-24 ENCOUNTER — OFFICE VISIT (OUTPATIENT)
Dept: CARDIOLOGY CLINIC | Facility: CLINIC | Age: 54
End: 2019-01-24
Payer: COMMERCIAL

## 2019-01-24 VITALS
SYSTOLIC BLOOD PRESSURE: 149 MMHG | BODY MASS INDEX: 32.28 KG/M2 | HEART RATE: 75 BPM | OXYGEN SATURATION: 97 % | WEIGHT: 182.19 LBS | DIASTOLIC BLOOD PRESSURE: 87 MMHG | TEMPERATURE: 98 F | HEIGHT: 63 IN

## 2019-01-24 DIAGNOSIS — R00.2 PALPITATIONS: Primary | ICD-10-CM

## 2019-01-24 DIAGNOSIS — E11.9 TYPE 2 DIABETES MELLITUS WITHOUT COMPLICATION, WITHOUT LONG-TERM CURRENT USE OF INSULIN (HCC): ICD-10-CM

## 2019-01-24 DIAGNOSIS — I10 ESSENTIAL HYPERTENSION: ICD-10-CM

## 2019-01-24 PROCEDURE — 99245 OFF/OP CONSLTJ NEW/EST HI 55: CPT | Performed by: INTERNAL MEDICINE

## 2019-01-24 PROCEDURE — 99212 OFFICE O/P EST SF 10 MIN: CPT | Performed by: INTERNAL MEDICINE

## 2019-01-24 RX ORDER — AMLODIPINE BESYLATE 5 MG/1
5 TABLET ORAL DAILY
Qty: 90 TABLET | Refills: 4 | Status: SHIPPED | OUTPATIENT
Start: 2019-01-24 | End: 2019-04-16 | Stop reason: ALTCHOICE

## 2019-01-24 NOTE — PATIENT INSTRUCTIONS
- start amlodipine 5 mg daily  - heart monitor 24 hours  - follow up monitor results with Dr Duane Rodriguez. See him again if any abnormalities.   - continue cardiac risk factor control with Dr. Sebastien Parnell

## 2019-01-24 NOTE — H&P
[facility], [facility]    Cardiac Electrophysiology Consultation  2019    Name:  Eulalia Morales  : [id number]    Date of consultation:   2019    Referring physician: Dr. Josselin Ross    Reason for Consultation:  palpitations    History of Present Illness: Sister    • Thyroid disease Brother    • Macular degeneration Neg    • Glaucoma Neg      She reports that  has never smoked. she has never used smokeless tobacco. She reports that she does not drink alcohol or use drugs.     Allergies:    Lisinopril film to affected area(s) Disp: 60 g Rfl: 3   Cyanocobalamin (VITAMIN B-12) 1000 MCG Sublingual SL Tab Place  under the tongue.  take 1 Tablet by Sublingual route  every day Disp:  Rfl:      No current facility-administered medications on file prior to visit GFRAA >60 01/03/2019    CA 9.5 01/03/2019    OSMOCALC 289 01/03/2019    ALKPHO 76 01/03/2019    AST 16 01/03/2019    ALT 15 01/03/2019    ALKPHOS 64 02/17/2016    BILT 0.7 01/03/2019    TP 8.2 01/03/2019    ALB 4.0 01/03/2019    GLOBULIN 4.2 (H) 01/03/2019

## 2019-02-23 RX ORDER — PANTOPRAZOLE SODIUM 40 MG/1
TABLET, DELAYED RELEASE ORAL
Qty: 180 TABLET | Refills: 0 | Status: SHIPPED | OUTPATIENT
Start: 2019-02-23 | End: 2019-02-28

## 2019-02-23 RX ORDER — FLUTICASONE PROPIONATE 50 MCG
SPRAY, SUSPENSION (ML) NASAL
Qty: 3 BOTTLE | Refills: 0 | Status: SHIPPED | OUTPATIENT
Start: 2019-02-23 | End: 2019-04-11 | Stop reason: ALTCHOICE

## 2019-02-23 RX ORDER — PANTOPRAZOLE SODIUM 40 MG/1
TABLET, DELAYED RELEASE ORAL
Qty: 180 TABLET | Refills: 1 | OUTPATIENT
Start: 2019-02-23

## 2019-02-23 NOTE — TELEPHONE ENCOUNTER
Current Outpatient Medications:  CARVEDILOL 6.25 MG Oral Tab TAKE ONE TABLET BY MOUTH TWICE DAILY Disp: 180 tablet Rfl: 1

## 2019-02-25 ENCOUNTER — TELEPHONE (OUTPATIENT)
Dept: INTERNAL MEDICINE CLINIC | Facility: CLINIC | Age: 54
End: 2019-02-25

## 2019-02-25 DIAGNOSIS — K21.9 GASTROESOPHAGEAL REFLUX DISEASE, ESOPHAGITIS PRESENCE NOT SPECIFIED: Primary | ICD-10-CM

## 2019-02-25 RX ORDER — CARVEDILOL 6.25 MG/1
6.25 TABLET ORAL 2 TIMES DAILY
Qty: 180 TABLET | Refills: 0 | Status: SHIPPED | OUTPATIENT
Start: 2019-02-25 | End: 2019-04-11

## 2019-02-25 NOTE — TELEPHONE ENCOUNTER
Current Outpatient Medications:  PANTOPRAZOLE SODIUM 40 MG Oral Tab EC TAKE ONE TABLET BY MOUTH TWICE DAILY Disp: 180 tablet Rfl: 1

## 2019-02-25 NOTE — TELEPHONE ENCOUNTER
Prior authorization for Pantoprazole Sodium 40MG oral tab completed w/ [employer] on cover my meds Key: LDL3PM, turn around time 3-5 days. Patient notified via [facility] [address].

## 2019-02-28 RX ORDER — PANTOPRAZOLE SODIUM 40 MG/1
40 TABLET, DELAYED RELEASE ORAL 2 TIMES DAILY
Qty: 180 TABLET | Refills: 2 | Status: SHIPPED | OUTPATIENT
Start: 2019-02-28 | End: 2019-04-11

## 2019-02-28 NOTE — TELEPHONE ENCOUNTER
Zo from [address]  stated that received call from insurance company that pantoprazole 40mg would be covered if directions were changed to once daily. Please advise.

## 2019-02-28 NOTE — TELEPHONE ENCOUNTER
Call from Radha Littlejohn stating PA for Pantoprazole was denied on 2/25/2019 due to not meeting criteria. Rep would like to do a redetermination over the telephone, call got disconnected.

## 2019-03-05 NOTE — TELEPHONE ENCOUNTER
I offered her the GI number and she said she already sees Dr. Jean-Claude Malave. She has had a lot of trouble getting Pantoprazole. She finally got it through [address]

## 2019-03-05 NOTE — TELEPHONE ENCOUNTER
She does not need referral for gastroenterology evaluation as she has  open access [place] for insurance which is not an age of

## 2019-04-09 ENCOUNTER — TELEPHONE (OUTPATIENT)
Dept: INTERNAL MEDICINE CLINIC | Facility: CLINIC | Age: 54
End: 2019-04-09

## 2019-04-09 ENCOUNTER — NURSE TRIAGE (OUTPATIENT)
Dept: OTHER | Age: 54
End: 2019-04-09

## 2019-04-09 NOTE — TELEPHONE ENCOUNTER
Please reply to pool: EM RN TRIAGE  Action Requested: Summary for Provider     []  Critical Lab, Recommendations Needed  [x] Need Additional Advice  []   FYI    [x]   Need Orders  [] Need Medications Sent to Pharmacy  []  Other     SUMMARY: Declines to s

## 2019-04-09 NOTE — TELEPHONE ENCOUNTER
Pt would like an order for her standard 6 month blood work. Please, call pt when the order is in the system.

## 2019-04-10 NOTE — TELEPHONE ENCOUNTER
Pt was called back and notified that orders for fasting f/u labs are already in system (from 01/06/19).

## 2019-04-11 ENCOUNTER — APPOINTMENT (OUTPATIENT)
Dept: LAB | Facility: HOSPITAL | Age: 54
End: 2019-04-11
Attending: INTERNAL MEDICINE
Payer: COMMERCIAL

## 2019-04-11 ENCOUNTER — OFFICE VISIT (OUTPATIENT)
Dept: INTERNAL MEDICINE CLINIC | Facility: CLINIC | Age: 54
End: 2019-04-11
Payer: COMMERCIAL

## 2019-04-11 VITALS
RESPIRATION RATE: 16 BRPM | WEIGHT: 184 LBS | DIASTOLIC BLOOD PRESSURE: 80 MMHG | HEIGHT: 63 IN | BODY MASS INDEX: 32.6 KG/M2 | SYSTOLIC BLOOD PRESSURE: 120 MMHG | HEART RATE: 60 BPM

## 2019-04-11 DIAGNOSIS — M54.2 NECK PAIN: ICD-10-CM

## 2019-04-11 DIAGNOSIS — J01.01 ACUTE RECURRENT MAXILLARY SINUSITIS: ICD-10-CM

## 2019-04-11 DIAGNOSIS — E55.9 VITAMIN D DEFICIENCY: ICD-10-CM

## 2019-04-11 DIAGNOSIS — I10 ESSENTIAL HYPERTENSION: Primary | ICD-10-CM

## 2019-04-11 DIAGNOSIS — I10 ESSENTIAL HYPERTENSION: ICD-10-CM

## 2019-04-11 DIAGNOSIS — R77.1 ELEVATED SERUM GLOBULIN LEVEL: ICD-10-CM

## 2019-04-11 DIAGNOSIS — E11.9 TYPE 2 DIABETES MELLITUS WITHOUT COMPLICATION, WITHOUT LONG-TERM CURRENT USE OF INSULIN (HCC): ICD-10-CM

## 2019-04-11 PROCEDURE — 99214 OFFICE O/P EST MOD 30 MIN: CPT | Performed by: INTERNAL MEDICINE

## 2019-04-11 PROCEDURE — 99212 OFFICE O/P EST SF 10 MIN: CPT | Performed by: INTERNAL MEDICINE

## 2019-04-11 PROCEDURE — 80053 COMPREHEN METABOLIC PANEL: CPT

## 2019-04-11 PROCEDURE — 83036 HEMOGLOBIN GLYCOSYLATED A1C: CPT

## 2019-04-11 PROCEDURE — 36415 COLL VENOUS BLD VENIPUNCTURE: CPT

## 2019-04-11 PROCEDURE — 80061 LIPID PANEL: CPT

## 2019-04-11 RX ORDER — LEVOCETIRIZINE DIHYDROCHLORIDE 5 MG/1
5 TABLET, FILM COATED ORAL EVERY EVENING
Qty: 30 TABLET | Refills: 5 | Status: SHIPPED | OUTPATIENT
Start: 2019-04-11 | End: 2019-11-13

## 2019-04-11 RX ORDER — PANTOPRAZOLE SODIUM 40 MG/1
40 TABLET, DELAYED RELEASE ORAL 2 TIMES DAILY
Qty: 180 TABLET | Refills: 2 | Status: SHIPPED | OUTPATIENT
Start: 2019-04-11 | End: 2020-01-31

## 2019-04-11 RX ORDER — CARVEDILOL 6.25 MG/1
6.25 TABLET ORAL 2 TIMES DAILY
Qty: 180 TABLET | Refills: 2 | Status: SHIPPED | OUTPATIENT
Start: 2019-04-11 | End: 2020-01-31

## 2019-04-11 RX ORDER — AZITHROMYCIN 250 MG/1
TABLET, FILM COATED ORAL
Qty: 6 TABLET | Refills: 0 | Status: SHIPPED | OUTPATIENT
Start: 2019-04-11 | End: 2019-04-16 | Stop reason: ALTCHOICE

## 2019-04-11 RX ORDER — TIZANIDINE 2 MG/1
TABLET ORAL
Qty: 30 TABLET | Refills: 3 | Status: SHIPPED | OUTPATIENT
Start: 2019-04-11 | End: 2020-01-31

## 2019-04-11 RX ORDER — LOSARTAN POTASSIUM AND HYDROCHLOROTHIAZIDE 25; 100 MG/1; MG/1
TABLET ORAL
Qty: 90 TABLET | Refills: 2 | Status: SHIPPED | OUTPATIENT
Start: 2019-04-11 | End: 2019-11-15

## 2019-04-11 RX ORDER — MONTELUKAST SODIUM 10 MG/1
10 TABLET ORAL NIGHTLY
Qty: 30 TABLET | Refills: 5 | Status: SHIPPED | OUTPATIENT
Start: 2019-04-11 | End: 2019-12-12

## 2019-04-11 RX ORDER — AZELASTINE HYDROCHLORIDE 137 UG/1
1 SPRAY, METERED NASAL 2 TIMES DAILY
Qty: 1 BOTTLE | Refills: 5 | Status: SHIPPED | OUTPATIENT
Start: 2019-04-11 | End: 2020-01-31

## 2019-04-11 RX ORDER — MELOXICAM 15 MG/1
TABLET ORAL
Qty: 30 TABLET | Refills: 5 | Status: SHIPPED | OUTPATIENT
Start: 2019-04-11 | End: 2021-01-05

## 2019-04-11 RX ORDER — MECLIZINE HYDROCHLORIDE 25 MG/1
25 TABLET ORAL 3 TIMES DAILY PRN
Qty: 20 TABLET | Refills: 2 | Status: SHIPPED | OUTPATIENT
Start: 2019-04-11 | End: 2020-01-05

## 2019-04-11 NOTE — PROGRESS NOTES
HPI:    Patient ID: Bessy Mendez is a 46year old female.     Viewed by Bessy Thomaskris on 1/6/2019  9:53 PM   Written by Jazmine Reeder MD on 1/6/2019  6:57 PM   Vitamin d levels though beter are still low,so please restart on vit d once a week as a pre onset. The problem is controlled. Associated symptoms include palpitations. Pertinent negatives include no peripheral edema or shortness of breath. There are no associated agents to hypertension.  Risk factors for coronary artery disease include dyslipidemi Eyes: Negative. Respiratory: Negative. Negative for shortness of breath. Cardiovascular: Positive for palpitations. Gastrointestinal: Negative. Endocrine: Negative. Genitourinary: Negative. Musculoskeletal: Negative. Skin: Negative. RASH  Shrimp                  ANAPHYLAXIS  Sulfa Antibiotics       RASH, SWELLING, SHORTNESS OF BREATH      04/11/19  0947   BP: 120/80   Pulse:    Resp:      Body mass index is 32.59 kg/m².     PHYSICAL EXAM:   Physical Exam   Constitutional: She is orient (1.6 m), weight 184 lb (83.5 kg), not currently breastfeeding. Pressures look stable upon recheck. She is currently on losartan hydrochlorothiazide 100/25 1 tablet daily, Coreg 6.25 mg twice daily and amlodipine 5 mg daily.   Heart rate looks stable at th pressure seems stable. Eye exam per Dr. Flor Hannah.   Foot exam has been normal.    Immunization History   Administered Date(s) Administered   • FLULAVAL 6 months & older 0.5 ml Prefilled syringe (77559) 10/19/2017   • Influenza 12/14/2010, 11/08/2011, 10/02/20 Sig: Take 1 tablet (10 mg total) by mouth nightly.        Imaging & Referrals:  None       [facility]#[id number]

## 2019-04-11 NOTE — ASSESSMENT & PLAN NOTE
Blood pressure 120/80, pulse 60, resp. rate 16, height 5' 3\" (1.6 m), weight 184 lb (83.5 kg), not currently breastfeeding. Pressures look stable upon recheck.   She is currently on losartan hydrochlorothiazide 100/25 1 tablet daily, Coreg 6.25 mg twice d

## 2019-04-11 NOTE — ASSESSMENT & PLAN NOTE
Patient has been on metformin 500 mg once daily at dinnertime which she has tolerated well. Last hemoglobin A1c at 6.3. She is due for recheck labs which have been ordered but pending at this time. Blood pressure seems stable. Eye exam per Dr. Alma Merritt.

## 2019-04-11 NOTE — PATIENT INSTRUCTIONS
Problem List Items Addressed This Visit        Unprioritized    Acute recurrent maxillary sinusitis     Patient with history of chronic allergic rhinitis and repeated episodes of sinus infection.   She has been doing well on Claritin and Flonase on a regula metformin 500 mg once daily at dinnertime which she has tolerated well. Last hemoglobin A1c at 6.3. She is due for recheck labs which have been ordered but pending at this time. Blood pressure seems stable. Eye exam per Dr. Renetta Rios.   Foot exam has been n

## 2019-04-11 NOTE — ASSESSMENT & PLAN NOTE
Patient with history of chronic allergic rhinitis and repeated episodes of sinus infection. She has been doing well on Claritin and Flonase on a regular basis.   This episode started with a flulike illness about 5 weeks back–recovered from the fever and ac

## 2019-04-16 ENCOUNTER — OFFICE VISIT (OUTPATIENT)
Dept: OTOLARYNGOLOGY | Facility: CLINIC | Age: 54
End: 2019-04-16
Payer: COMMERCIAL

## 2019-04-16 VITALS
DIASTOLIC BLOOD PRESSURE: 80 MMHG | TEMPERATURE: 97 F | HEIGHT: 63 IN | BODY MASS INDEX: 32.6 KG/M2 | SYSTOLIC BLOOD PRESSURE: 124 MMHG | WEIGHT: 184 LBS

## 2019-04-16 DIAGNOSIS — J32.9 CHRONIC SINUSITIS, UNSPECIFIED LOCATION: ICD-10-CM

## 2019-04-16 DIAGNOSIS — R42 DIZZINESS: Primary | ICD-10-CM

## 2019-04-16 PROCEDURE — 99213 OFFICE O/P EST LOW 20 MIN: CPT | Performed by: OTOLARYNGOLOGY

## 2019-04-16 PROCEDURE — 99212 OFFICE O/P EST SF 10 MIN: CPT | Performed by: OTOLARYNGOLOGY

## 2019-04-17 NOTE — PROGRESS NOTES
Brian Elena is a 46year old female.  Patient presents with:  Sinus Problem: pt reports had a sinus infection last wk, nasal congestion   Throat Problem: feels a lump on right side of throat, post nasal drip  Ear Problem: dizziness, pressure in both ear Cream External application 2 times daily as directed. Disp: 45 g Rfl: 0   Cyanocobalamin (VITAMIN B-12) 1000 MCG Sublingual SL Tab Place  under the tongue.  take 1 Tablet by Sublingual route  every day Disp:  Rfl:       Past Medical History:   Diagnosis Nolan Conjunctiva - Right: Normal, Left: Normal. Pupil - Right: Normal, Left: Normal.    Ears Normal Inspection - Right: Normal, Left: Normal. Canal - Left: Normal. TM - Right: Normal, Left: Normal.  Ear canals and tympanic memories clear       ASSESSMENT AND PL

## 2019-05-02 NOTE — TELEPHONE ENCOUNTER
Review pended refill request as it does not fall under a protocol.     Last Rx: 10/2017  LOV: 4/11/19    Requested Prescriptions   Pending Prescriptions Disp Refills   • Diclofenac Sodium 1 % Transdermal Gel [Pharmacy Med Name: DICLOFENAC 1%       GEL]  3

## 2019-08-20 ENCOUNTER — APPOINTMENT (OUTPATIENT)
Dept: LAB | Age: 54
End: 2019-08-20
Attending: INTERNAL MEDICINE
Payer: COMMERCIAL

## 2019-08-20 DIAGNOSIS — R77.1 ELEVATED SERUM GLOBULIN LEVEL: ICD-10-CM

## 2019-08-20 LAB
IGA SERPL-MCNC: 241 MG/DL (ref 70–312)
IGM SERPL-MCNC: 195 MG/DL (ref 43–279)
IMMUNOGLOBULIN PNL SER-MCNC: 1650 MG/DL (ref 791–1643)

## 2019-08-20 PROCEDURE — 84165 PROTEIN E-PHORESIS SERUM: CPT

## 2019-08-20 PROCEDURE — 82784 ASSAY IGA/IGD/IGG/IGM EACH: CPT

## 2019-08-20 PROCEDURE — 36415 COLL VENOUS BLD VENIPUNCTURE: CPT

## 2019-08-23 LAB
ALBUMIN SERPL ELPH-MCNC: 4.15 G/DL (ref 3.75–5.21)
ALBUMIN/GLOB SERPL: 1.11 {RATIO} (ref 1–2)
ALPHA1 GLOB SERPL ELPH-MCNC: 0.32 G/DL (ref 0.19–0.46)
ALPHA2 GLOB SERPL ELPH-MCNC: 0.81 G/DL (ref 0.48–1.05)
B-GLOBULIN SERPL ELPH-MCNC: 0.92 G/DL (ref 0.68–1.23)
GAMMA GLOB SERPL ELPH-MCNC: 1.69 G/DL (ref 0.62–1.7)
TOTAL PROTEIN (SPECIAL TESTING): 7.9 G/DL (ref 6.5–9.1)

## 2019-11-13 DIAGNOSIS — J01.01 ACUTE RECURRENT MAXILLARY SINUSITIS: ICD-10-CM

## 2019-11-14 RX ORDER — LEVOCETIRIZINE DIHYDROCHLORIDE 5 MG/1
TABLET, FILM COATED ORAL
Qty: 90 TABLET | Refills: 1 | Status: SHIPPED | OUTPATIENT
Start: 2019-11-14 | End: 2020-05-12

## 2019-11-15 ENCOUNTER — TELEPHONE (OUTPATIENT)
Dept: INTERNAL MEDICINE CLINIC | Facility: CLINIC | Age: 54
End: 2019-11-15

## 2019-11-15 RX ORDER — HYDROCHLOROTHIAZIDE 25 MG/1
25 TABLET ORAL DAILY
Qty: 90 TABLET | Refills: 1 | Status: SHIPPED | OUTPATIENT
Start: 2019-11-15 | End: 2020-01-31

## 2019-11-15 RX ORDER — LOSARTAN POTASSIUM 100 MG/1
100 TABLET ORAL DAILY
Qty: 90 TABLET | Refills: 1 | Status: SHIPPED | OUTPATIENT
Start: 2019-11-15 | End: 2021-01-05

## 2019-11-15 NOTE — TELEPHONE ENCOUNTER
Refill passed per [facility], [facility] protocol.   Refill Protocol Appointment Criteria  · Appointment scheduled in the past 12 months or in the next 3 months  Recent Outpatient Visits            7 months ago [address] [place] [address] for [address] [place] [address]

## 2019-12-12 DIAGNOSIS — J01.01 ACUTE RECURRENT MAXILLARY SINUSITIS: ICD-10-CM

## 2019-12-12 RX ORDER — MONTELUKAST SODIUM 10 MG/1
TABLET ORAL
Qty: 30 TABLET | Refills: 5 | Status: SHIPPED | OUTPATIENT
Start: 2019-12-12 | End: 2020-01-31

## 2020-01-05 RX ORDER — MECLIZINE HYDROCHLORIDE 25 MG/1
TABLET ORAL
Qty: 40 TABLET | Refills: 0 | Status: SHIPPED | OUTPATIENT
Start: 2020-01-05 | End: 2020-01-31

## 2020-01-05 NOTE — TELEPHONE ENCOUNTER
Review pended refill request as it does not fall under a protocol.     Last Rx: 4/11/19  LOV: 8 months ago

## 2020-01-17 ENCOUNTER — TELEPHONE (OUTPATIENT)
Dept: INTERNAL MEDICINE CLINIC | Facility: CLINIC | Age: 55
End: 2020-01-17

## 2020-01-17 DIAGNOSIS — E55.9 VITAMIN D DEFICIENCY: ICD-10-CM

## 2020-01-17 DIAGNOSIS — Z12.31 VISIT FOR SCREENING MAMMOGRAM: Primary | ICD-10-CM

## 2020-01-17 DIAGNOSIS — E11.9 TYPE 2 DIABETES MELLITUS WITHOUT COMPLICATION, WITHOUT LONG-TERM CURRENT USE OF INSULIN (HCC): ICD-10-CM

## 2020-01-17 NOTE — TELEPHONE ENCOUNTER
Dr. Daniel Mercado, patient is requesting a mammogram order. Last mammogram was completed 11/27/2018. Please advise on order.      Please reply to pool: EM TRIAGE SUPPORT

## 2020-01-17 NOTE — TELEPHONE ENCOUNTER
Pt would like an order for her mammogram and her routine blood work. Please, call pt when the orders are in the system.

## 2020-01-24 NOTE — TELEPHONE ENCOUNTER
Left detailed message informing patient of order placed and appointment required for Dr Jacinda Guillory.

## 2020-01-29 ENCOUNTER — OFFICE VISIT (OUTPATIENT)
Dept: OBGYN CLINIC | Facility: CLINIC | Age: 55
End: 2020-01-29
Payer: COMMERCIAL

## 2020-01-29 ENCOUNTER — LAB ENCOUNTER (OUTPATIENT)
Dept: LAB | Facility: HOSPITAL | Age: 55
End: 2020-01-29
Attending: INTERNAL MEDICINE
Payer: COMMERCIAL

## 2020-01-29 VITALS
SYSTOLIC BLOOD PRESSURE: 119 MMHG | DIASTOLIC BLOOD PRESSURE: 77 MMHG | WEIGHT: 186 LBS | BODY MASS INDEX: 33 KG/M2 | HEART RATE: 64 BPM

## 2020-01-29 DIAGNOSIS — E55.9 VITAMIN D DEFICIENCY: ICD-10-CM

## 2020-01-29 DIAGNOSIS — Z12.4 CERVICAL CANCER SCREENING: ICD-10-CM

## 2020-01-29 DIAGNOSIS — E11.9 TYPE 2 DIABETES MELLITUS WITHOUT COMPLICATION, WITHOUT LONG-TERM CURRENT USE OF INSULIN (HCC): ICD-10-CM

## 2020-01-29 DIAGNOSIS — Z01.419 ENCOUNTER FOR GYNECOLOGICAL EXAMINATION WITHOUT ABNORMAL FINDING: Primary | ICD-10-CM

## 2020-01-29 LAB
25(OH)D3 SERPL-MCNC: 31.3 NG/ML (ref 30–100)
ALBUMIN SERPL-MCNC: 3.7 G/DL (ref 3.4–5)
ALBUMIN/GLOB SERPL: 0.8 {RATIO} (ref 1–2)
ALP LIVER SERPL-CCNC: 74 U/L (ref 41–108)
ALT SERPL-CCNC: 18 U/L (ref 13–56)
ANION GAP SERPL CALC-SCNC: 4 MMOL/L (ref 0–18)
AST SERPL-CCNC: 10 U/L (ref 15–37)
BACTERIA UR QL AUTO: NEGATIVE /HPF
BASOPHILS # BLD AUTO: 0.03 X10(3) UL (ref 0–0.2)
BASOPHILS NFR BLD AUTO: 0.5 %
BILIRUB SERPL-MCNC: 0.5 MG/DL (ref 0.1–2)
BILIRUB UR QL: NEGATIVE
BUN BLD-MCNC: 15 MG/DL (ref 7–18)
BUN/CREAT SERPL: 19.7 (ref 10–20)
CALCIUM BLD-MCNC: 8.9 MG/DL (ref 8.5–10.1)
CHLORIDE SERPL-SCNC: 104 MMOL/L (ref 98–112)
CHOLEST SMN-MCNC: 215 MG/DL (ref ?–200)
CLARITY UR: CLEAR
CO2 SERPL-SCNC: 33 MMOL/L (ref 21–32)
COLOR UR: YELLOW
CREAT BLD-MCNC: 0.76 MG/DL (ref 0.55–1.02)
DEPRECATED RDW RBC AUTO: 46.7 FL (ref 35.1–46.3)
EOSINOPHIL # BLD AUTO: 0.2 X10(3) UL (ref 0–0.7)
EOSINOPHIL NFR BLD AUTO: 3.4 %
ERYTHROCYTE [DISTWIDTH] IN BLOOD BY AUTOMATED COUNT: 14.3 % (ref 11–15)
EST. AVERAGE GLUCOSE BLD GHB EST-MCNC: 137 MG/DL (ref 68–126)
GLOBULIN PLAS-MCNC: 4.6 G/DL (ref 2.8–4.4)
GLUCOSE BLD-MCNC: 122 MG/DL (ref 70–99)
GLUCOSE UR-MCNC: NEGATIVE MG/DL
HBA1C MFR BLD HPLC: 6.4 % (ref ?–5.7)
HCT VFR BLD AUTO: 37.8 % (ref 35–48)
HDLC SERPL-MCNC: 70 MG/DL (ref 40–59)
HGB BLD-MCNC: 12.4 G/DL (ref 12–16)
IMM GRANULOCYTES # BLD AUTO: 0.01 X10(3) UL (ref 0–1)
IMM GRANULOCYTES NFR BLD: 0.2 %
KETONES UR-MCNC: NEGATIVE MG/DL
LDLC SERPL CALC-MCNC: 125 MG/DL (ref ?–100)
LEUKOCYTE ESTERASE UR QL STRIP.AUTO: NEGATIVE
LYMPHOCYTES # BLD AUTO: 2.01 X10(3) UL (ref 1–4)
LYMPHOCYTES NFR BLD AUTO: 34.7 %
M PROTEIN MFR SERPL ELPH: 8.3 G/DL (ref 6.4–8.2)
MCH RBC QN AUTO: 29.6 PG (ref 26–34)
MCHC RBC AUTO-ENTMCNC: 32.8 G/DL (ref 31–37)
MCV RBC AUTO: 90.2 FL (ref 80–100)
MONOCYTES # BLD AUTO: 0.34 X10(3) UL (ref 0.1–1)
MONOCYTES NFR BLD AUTO: 5.9 %
NEUTROPHILS # BLD AUTO: 3.21 X10 (3) UL (ref 1.5–7.7)
NEUTROPHILS # BLD AUTO: 3.21 X10(3) UL (ref 1.5–7.7)
NEUTROPHILS NFR BLD AUTO: 55.3 %
NITRITE UR QL STRIP.AUTO: NEGATIVE
NONHDLC SERPL-MCNC: 145 MG/DL (ref ?–130)
OSMOLALITY SERPL CALC.SUM OF ELEC: 294 MOSM/KG (ref 275–295)
PATIENT FASTING Y/N/NP: YES
PATIENT FASTING Y/N/NP: YES
PH UR: 6 [PH] (ref 5–8)
PLATELET # BLD AUTO: 320 10(3)UL (ref 150–450)
POTASSIUM SERPL-SCNC: 3.8 MMOL/L (ref 3.5–5.1)
PROT UR-MCNC: NEGATIVE MG/DL
RBC # BLD AUTO: 4.19 X10(6)UL (ref 3.8–5.3)
RBC #/AREA URNS AUTO: 7 /HPF
SODIUM SERPL-SCNC: 141 MMOL/L (ref 136–145)
SP GR UR STRIP: 1.02 (ref 1–1.03)
TRIGL SERPL-MCNC: 102 MG/DL (ref 30–149)
TSI SER-ACNC: 2.04 MIU/ML (ref 0.36–3.74)
UROBILINOGEN UR STRIP-ACNC: <2
VIT B12 SERPL-MCNC: 1566 PG/ML (ref 193–986)
VLDLC SERPL CALC-MCNC: 20 MG/DL (ref 0–30)
WBC # BLD AUTO: 5.8 X10(3) UL (ref 4–11)
WBC #/AREA URNS AUTO: 1 /HPF

## 2020-01-29 PROCEDURE — 81001 URINALYSIS AUTO W/SCOPE: CPT

## 2020-01-29 PROCEDURE — 80053 COMPREHEN METABOLIC PANEL: CPT

## 2020-01-29 PROCEDURE — 99396 PREV VISIT EST AGE 40-64: CPT | Performed by: OBSTETRICS & GYNECOLOGY

## 2020-01-29 PROCEDURE — 85025 COMPLETE CBC W/AUTO DIFF WBC: CPT

## 2020-01-29 PROCEDURE — 82607 VITAMIN B-12: CPT

## 2020-01-29 PROCEDURE — 80061 LIPID PANEL: CPT

## 2020-01-29 PROCEDURE — 82306 VITAMIN D 25 HYDROXY: CPT

## 2020-01-29 PROCEDURE — 84443 ASSAY THYROID STIM HORMONE: CPT

## 2020-01-29 PROCEDURE — 83036 HEMOGLOBIN GLYCOSYLATED A1C: CPT

## 2020-01-29 PROCEDURE — 36415 COLL VENOUS BLD VENIPUNCTURE: CPT

## 2020-01-29 RX ORDER — LOSARTAN POTASSIUM AND HYDROCHLOROTHIAZIDE 25; 100 MG/1; MG/1
1 TABLET ORAL
COMMUNITY
Start: 2019-11-27 | End: 2020-01-31

## 2020-01-29 NOTE — PROGRESS NOTES
Well Woman Exam    HPI:  The patient is a 50yo female who presents today for annual exam. She complains of some vulvar irritation. She is using cocoanut oil which has helped. She notices it mostly after wearing pads. She is using [employer].  She states it c Alcohol/week: 0.0 standard drinks      Drug use: No      Sexual activity: Not on file    Lifestyle      Physical activity:        Days per week: Not on file        Minutes per session: Not on file      Stress: Not on file    Relationships      Social conne 0  •  MONTELUKAST SODIUM 10 MG Oral Tab, TAKE ONE TABLET BY MOUTH AT BEDTIME , Disp: 30 tablet, Rfl: 5  •  losartan 100 MG Oral Tab, Take 1 tablet (100 mg total) by mouth daily. , Disp: 90 tablet, Rfl: 1  •  hydrochlorothiazide 25 MG Oral Tab, Take 1 tablet pain or palpitations  Respiratory:  denies shortness of breath  Gastrointestinal:  denies nausea, vomiting, diarrhea and constipation and severe abdominal pain  Genitourinary:  denies dysuria, incontinence  Heme: Denies easy bruising   Skin/Breast:  Denies intervals for the individual  2. Reviewed breast self awareness- continue to monitor heaviness that she is feeling. 3. Mammogram already scheduled   4.  Follow up in 1 year

## 2020-01-30 LAB — HPV I/H RISK 1 DNA SPEC QL NAA+PROBE: NEGATIVE

## 2020-01-31 ENCOUNTER — OFFICE VISIT (OUTPATIENT)
Dept: OTOLARYNGOLOGY | Facility: CLINIC | Age: 55
End: 2020-01-31
Payer: COMMERCIAL

## 2020-01-31 ENCOUNTER — OFFICE VISIT (OUTPATIENT)
Dept: INTERNAL MEDICINE CLINIC | Facility: CLINIC | Age: 55
End: 2020-01-31
Payer: COMMERCIAL

## 2020-01-31 VITALS
HEIGHT: 63 IN | SYSTOLIC BLOOD PRESSURE: 126 MMHG | HEART RATE: 58 BPM | RESPIRATION RATE: 16 BRPM | WEIGHT: 187 LBS | DIASTOLIC BLOOD PRESSURE: 78 MMHG | BODY MASS INDEX: 33.13 KG/M2

## 2020-01-31 VITALS
WEIGHT: 187 LBS | TEMPERATURE: 97 F | BODY MASS INDEX: 33.13 KG/M2 | SYSTOLIC BLOOD PRESSURE: 153 MMHG | DIASTOLIC BLOOD PRESSURE: 77 MMHG | HEIGHT: 63 IN

## 2020-01-31 DIAGNOSIS — M54.2 CERVICALGIA: ICD-10-CM

## 2020-01-31 DIAGNOSIS — I10 ESSENTIAL HYPERTENSION: Primary | ICD-10-CM

## 2020-01-31 DIAGNOSIS — E11.9 TYPE 2 DIABETES MELLITUS WITHOUT COMPLICATION, WITHOUT LONG-TERM CURRENT USE OF INSULIN (HCC): ICD-10-CM

## 2020-01-31 DIAGNOSIS — E55.9 VITAMIN D DEFICIENCY: ICD-10-CM

## 2020-01-31 DIAGNOSIS — J32.8 OTHER CHRONIC SINUSITIS: ICD-10-CM

## 2020-01-31 DIAGNOSIS — M54.2 NECK PAIN: ICD-10-CM

## 2020-01-31 DIAGNOSIS — K21.9 GERD WITHOUT ESOPHAGITIS: ICD-10-CM

## 2020-01-31 DIAGNOSIS — J01.01 ACUTE RECURRENT MAXILLARY SINUSITIS: ICD-10-CM

## 2020-01-31 DIAGNOSIS — Z00.00 ROUTINE PHYSICAL EXAMINATION: ICD-10-CM

## 2020-01-31 DIAGNOSIS — R07.0 THROAT PAIN: Primary | ICD-10-CM

## 2020-01-31 PROCEDURE — 99396 PREV VISIT EST AGE 40-64: CPT | Performed by: INTERNAL MEDICINE

## 2020-01-31 PROCEDURE — 99213 OFFICE O/P EST LOW 20 MIN: CPT | Performed by: OTOLARYNGOLOGY

## 2020-01-31 RX ORDER — HYDROCHLOROTHIAZIDE 25 MG/1
25 TABLET ORAL DAILY
Qty: 90 TABLET | Refills: 2 | Status: SHIPPED | OUTPATIENT
Start: 2020-01-31 | End: 2021-01-05

## 2020-01-31 RX ORDER — MECLIZINE HYDROCHLORIDE 25 MG/1
TABLET ORAL
Qty: 20 TABLET | Refills: 1 | Status: SHIPPED | OUTPATIENT
Start: 2020-01-31 | End: 2020-11-14

## 2020-01-31 RX ORDER — PANTOPRAZOLE SODIUM 40 MG/1
40 TABLET, DELAYED RELEASE ORAL 2 TIMES DAILY
Qty: 180 TABLET | Refills: 2 | Status: SHIPPED | OUTPATIENT
Start: 2020-01-31 | End: 2020-12-10

## 2020-01-31 RX ORDER — MELOXICAM 15 MG/1
TABLET ORAL
Qty: 90 TABLET | Refills: 1 | Status: CANCELLED
Start: 2020-01-31

## 2020-01-31 RX ORDER — FLUOCINOLONE ACETONIDE 0.11 MG/ML
OIL TOPICAL
Qty: 118 ML | Refills: 2 | Status: SHIPPED | OUTPATIENT
Start: 2020-01-31

## 2020-01-31 RX ORDER — AZELASTINE HYDROCHLORIDE 137 UG/1
1 SPRAY, METERED NASAL 2 TIMES DAILY
Qty: 3 BOTTLE | Refills: 1 | Status: SHIPPED | OUTPATIENT
Start: 2020-01-31 | End: 2022-01-07

## 2020-01-31 RX ORDER — MONTELUKAST SODIUM 10 MG/1
TABLET ORAL
Qty: 90 TABLET | Refills: 2 | Status: SHIPPED | OUTPATIENT
Start: 2020-01-31 | End: 2021-01-05

## 2020-01-31 RX ORDER — CARVEDILOL 6.25 MG/1
6.25 TABLET ORAL 2 TIMES DAILY
Qty: 180 TABLET | Refills: 2 | Status: SHIPPED | OUTPATIENT
Start: 2020-01-31 | End: 2020-12-06

## 2020-01-31 RX ORDER — MOMETASONE FUROATE 1 MG/G
CREAM TOPICAL
Qty: 45 G | Refills: 0 | Status: SHIPPED | OUTPATIENT
Start: 2020-01-31

## 2020-01-31 RX ORDER — TIZANIDINE 2 MG/1
TABLET ORAL
Qty: 90 TABLET | Refills: 1 | Status: SHIPPED | OUTPATIENT
Start: 2020-01-31 | End: 2021-01-06

## 2020-01-31 NOTE — ASSESSMENT & PLAN NOTE
This has been supplemented in the past and levels have been stable. Recheck labs as directed. Advised to take an over-the-counter vitamin D 2000 units daily.

## 2020-01-31 NOTE — ASSESSMENT & PLAN NOTE
History of chronic reflux which has been an issue for quite a while. She has had an EGD showing grade 1 esophagitis with reflux related mild gastritis. And has been on pantoprazole which she has been taking since 2012.   Would recommend a follow-up evalua

## 2020-01-31 NOTE — PATIENT INSTRUCTIONS
Problem List Items Addressed This Visit        Unprioritized    Cervicalgia     Chronic neck stiffness and pain with pain extending into the lateral right side.   She has had an x-ray of the C-spine in March 2017 which shows degenerative arthritis most pron scheduled. DEXA scan due next year. No cervical or inguinal lymphadenopathy. Hernial orifices intact. Rectal exam normal,no palpable abnormalities. Hemorrhoids-small internal hemorrhoids present.   Brown stools,guaic negetive  Pelvic exam and Pap smear

## 2020-01-31 NOTE — PROGRESS NOTES
REASON FOR VISIT:    Sheyla Schaffer is a 53year old female who presents for an [address].   Immunization History   Administered Date(s) Administered   • FLULAVAL 6 months & older 0.5 ml Prefilled syringe ([id number]) 10/19/2017   • Influenza 12/ Depression Screening (PHQ-2/PHQ-9): Over the LAST 2 WEEKS                      PREVENTATIVE SERVICES  INDICATIONS AND SCHEDULE Recommendation Internal Lab or Procedure External Lab or Procedure   Breast Cancer Screening   Every 2 yrs age 54-69 Mammogra Value   01/29/2020 3.8     POTASSIUM (P) (mmol/L)   Date Value   02/17/2016 3.8    No flowsheet data found. Creatinine  Annually Creatinine (mg/dL)   Date Value   01/29/2020 0.76    No flowsheet data found.     Digoxin Serum Conc  Annually No results fou Tab EC Take 1 tablet (40 mg total) by mouth 2 (two) times daily. 180 tablet 2   • tiZANidine HCl 2 MG Oral Tab TAKE ONE TABLET BY MOUTH NIGHTLY prn 90 tablet 1   • losartan 100 MG Oral Tab Take 1 tablet (100 mg total) by mouth daily.  90 tablet 1   • LEVOCE lesions  EYES: denies blurred vision or double vision  HEENT: denies nasal congestion, sinus pain or ST  LUNGS: denies shortness of breath with exertion  CARDIOVASCULAR: denies chest pain on exertion  GI: denies abdominal pain, denies heartburn  : denies presents for an [address]. Problem List Items Addressed This Visit        Unprioritized    Cervicalgia     Chronic neck stiffness and pain with pain extending into the lateral right side.   She has had an x-ray of the C-spine in March 2 axillary adenopathy. Mammogram has been scheduled. DEXA scan due next year. No cervical or inguinal lymphadenopathy. Hernial orifices intact. Rectal exam normal,no palpable abnormalities. Hemorrhoids-small internal hemorrhoids present.   Brown stools, SUMMARY:   Diagnoses and all orders for this visit:    Essential hypertension    Acute recurrent maxillary sinusitis  -     Azelastine HCl 137 MCG/SPRAY Nasal Solution; 1 puff by Nasal route 2 (two) times daily.  -     Montelukast Sodium 10 MG Oral Tab; TA 10/19/2017   • Influenza 12/14/2010, 11/08/2011, 10/02/2012, 10/29/2013, 10/01/2015, 10/03/2016, 10/01/2018   • TDAP 09/20/2010       Influenza Annually   Pneumococcal if high risk   Td/Tdap once then every 10 years   HPV Females 11-26: 3 doses   Zoster (S

## 2020-01-31 NOTE — ASSESSMENT & PLAN NOTE
Chronic neck stiffness and pain with pain extending into the lateral right side.   She has had an x-ray of the C-spine in March 2017 which shows degenerative arthritis most pronounced at C5-6 with mild disc space narrowing at this level as well as foraminal

## 2020-01-31 NOTE — ASSESSMENT & PLAN NOTE
History of chronic allergic rhinitis, multiple sinus infections and sinus pressure. She is on Xyzal, azelastine and Singulair. She continues to have some nasal turbinate hypertrophy. No ear abnormalities noted.   No sinus tenderness is noted at this time

## 2020-01-31 NOTE — ASSESSMENT & PLAN NOTE
Patient has been on metformin 500 mg daily which she has tolerated well. Hemoglobin A1c stable at 6.4.   Lipid panel shows some elevation in the LDL cholesterol but this was done immediately after the holiday season and hence advised to start strict diet c

## 2020-01-31 NOTE — ASSESSMENT & PLAN NOTE
Normal exam.  Labs as ordered. Skin check normal.  No significant abnormal nevi. Breast exam completed–no palpable abnormalities, discharge from the nipples or axillary adenopathy. Mammogram has been scheduled. DEXA scan due next year.   No cervical or

## 2020-02-01 NOTE — PROGRESS NOTES
David Looney is a 53year old female. Patient presents with:  Throat Problem: c/o throat pain radiating to her right ear for 2 weeks    HPI:   She still has intermittent pain in the right side of her throat. This can extend to her ears at times.   The p taking: Reported on 2020 ) 90 tablet 1      Past Medical History:   Diagnosis Date   • Acid reflux    • Anemia    • Chronic rhinitis    • History of eye injury     OS, Eye injury from curtain kiki   • History of pregnancy      x4, [place] Throat pain  No abnormality seen on examination. I suspect that there may be some viral component and may be even some issue with lingual tonsillar issues. Symptomatic care discussed.   Return for any problems     t indicates understanding of these issues

## 2020-05-12 DIAGNOSIS — J01.01 ACUTE RECURRENT MAXILLARY SINUSITIS: ICD-10-CM

## 2020-05-12 RX ORDER — LEVOCETIRIZINE DIHYDROCHLORIDE 5 MG/1
TABLET, FILM COATED ORAL
Qty: 90 TABLET | Refills: 0 | Status: SHIPPED | OUTPATIENT
Start: 2020-05-12 | End: 2020-08-11

## 2020-05-15 ENCOUNTER — APPOINTMENT (OUTPATIENT)
Dept: LAB | Facility: HOSPITAL | Age: 55
End: 2020-05-15
Attending: INTERNAL MEDICINE
Payer: COMMERCIAL

## 2020-05-15 DIAGNOSIS — E11.9 TYPE 2 DIABETES MELLITUS WITHOUT COMPLICATION, WITHOUT LONG-TERM CURRENT USE OF INSULIN (HCC): ICD-10-CM

## 2020-05-15 PROCEDURE — 80053 COMPREHEN METABOLIC PANEL: CPT

## 2020-05-15 PROCEDURE — 80061 LIPID PANEL: CPT

## 2020-05-15 PROCEDURE — 83036 HEMOGLOBIN GLYCOSYLATED A1C: CPT

## 2020-05-15 PROCEDURE — 36415 COLL VENOUS BLD VENIPUNCTURE: CPT

## 2020-05-26 ENCOUNTER — HOSPITAL ENCOUNTER (OUTPATIENT)
Dept: MAMMOGRAPHY | Facility: HOSPITAL | Age: 55
Discharge: HOME | End: 2020-05-26
Attending: INTERNAL MEDICINE
Payer: COMMERCIAL

## 2020-05-26 DIAGNOSIS — Z12.31 VISIT FOR SCREENING MAMMOGRAM: ICD-10-CM

## 2020-05-26 PROCEDURE — 77067 SCR MAMMO BI INCL CAD: CPT | Performed by: INTERNAL MEDICINE

## 2020-05-26 PROCEDURE — 77063 BREAST TOMOSYNTHESIS BI: CPT | Performed by: INTERNAL MEDICINE

## 2020-08-11 DIAGNOSIS — J01.01 ACUTE RECURRENT MAXILLARY SINUSITIS: ICD-10-CM

## 2020-08-11 RX ORDER — LEVOCETIRIZINE DIHYDROCHLORIDE 5 MG/1
5 TABLET, FILM COATED ORAL NIGHTLY
Qty: 90 TABLET | Refills: 2 | Status: SHIPPED | OUTPATIENT
Start: 2020-08-11 | End: 2021-01-05

## 2020-08-11 NOTE — TELEPHONE ENCOUNTER
Current Outpatient Medications:   •  LEVOCETIRIZINE DIHYDROCHLORIDE 5 MG Oral Tab, TAKE ONE TABLET BY MOUTH AT BEDTIME, Disp: 90 tablet, Rfl: 0

## 2020-10-22 ENCOUNTER — TELEPHONE (OUTPATIENT)
Dept: INTERNAL MEDICINE CLINIC | Facility: CLINIC | Age: 55
End: 2020-10-22

## 2020-10-22 DIAGNOSIS — K21.9 GASTROESOPHAGEAL REFLUX DISEASE, UNSPECIFIED WHETHER ESOPHAGITIS PRESENT: Primary | ICD-10-CM

## 2020-10-22 DIAGNOSIS — Z00.00 ROUTINE PHYSICAL EXAMINATION: ICD-10-CM

## 2020-10-22 DIAGNOSIS — R77.1 ELEVATED SERUM GLOBULIN LEVEL: ICD-10-CM

## 2020-10-22 DIAGNOSIS — E55.9 VITAMIN D DEFICIENCY: Primary | ICD-10-CM

## 2020-10-22 DIAGNOSIS — E11.9 TYPE 2 DIABETES MELLITUS WITHOUT COMPLICATION, WITHOUT LONG-TERM CURRENT USE OF INSULIN (HCC): ICD-10-CM

## 2020-10-22 NOTE — TELEPHONE ENCOUNTER
Patient called in stating that she was instructed by Dr. Peter Tracy to see GI for issues that she has had for some time. Patient asking for a referral to see GI specialist. Sherin Matt did not see an initial message from last office visit. Please advise.

## 2020-10-22 NOTE — TELEPHONE ENCOUNTER
Patient is requesting orders below:    -Annual labs    She is requesting the orders be approved prior to physical appointment. Please call back with confirmation once approved.

## 2020-11-10 NOTE — TELEPHONE ENCOUNTER
Ninoska Carter from [facility] stated pt needs a refill on      Meclizine HCl 25 MG Oral Tab 20 tablet 1 1/31/2020    Sig:   TAKE 1 TABLET BY MOUTH THREE TIMES DAILY AS NEEDED FOR DIZZINESS           Thank you

## 2020-11-14 RX ORDER — MECLIZINE HYDROCHLORIDE 25 MG/1
TABLET ORAL
Qty: 20 TABLET | Refills: 1 | Status: SHIPPED | OUTPATIENT
Start: 2020-11-14 | End: 2020-11-27

## 2020-11-27 RX ORDER — MECLIZINE HYDROCHLORIDE 25 MG/1
TABLET ORAL
Qty: 20 TABLET | Refills: 0 | Status: SHIPPED | OUTPATIENT
Start: 2020-11-27 | End: 2021-03-06

## 2020-12-06 RX ORDER — CARVEDILOL 6.25 MG/1
6.25 TABLET ORAL 2 TIMES DAILY
Qty: 180 TABLET | Refills: 0 | Status: SHIPPED | OUTPATIENT
Start: 2020-12-06 | End: 2021-01-05

## 2020-12-09 ENCOUNTER — TELEPHONE (OUTPATIENT)
Dept: INTERNAL MEDICINE CLINIC | Facility: CLINIC | Age: 55
End: 2020-12-09

## 2020-12-09 NOTE — TELEPHONE ENCOUNTER
Patient was referred to Dr. Yung Doctor in Gastroenterology and the first available is 2/1/2020. Patient states she cannot wait that long to be seen and wanted to know if there are other options for her to be seen sooner to address her current issue.  Patient stat

## 2020-12-10 RX ORDER — PANTOPRAZOLE SODIUM 40 MG/1
40 TABLET, DELAYED RELEASE ORAL 2 TIMES DAILY
Qty: 180 TABLET | Refills: 0 | Status: SHIPPED | OUTPATIENT
Start: 2020-12-10 | End: 2020-12-21

## 2020-12-10 NOTE — TELEPHONE ENCOUNTER
Dr. Evens Cruz, please see message below. Patient to check availability with any other GI provider? Or office to send request for sooner appt with Dr. Daisy Fortune?

## 2020-12-10 NOTE — TELEPHONE ENCOUNTER
Patient saw Dr. Kiera Barron back in 2015 but otherwise would be new to GI. Referred for GERD. Dr. Oswaldo Tsai requesting consult appt in 1-2 weeks. Vaughn Norton, you have two [facility] appts available this Wednesday 12/16. OK to offer to pt for consult appt?

## 2020-12-10 NOTE — TELEPHONE ENCOUNTER
Left message to call back on home and mobile numbers. CSS when patient returns call please offer a 30 min consult appt with Cherelle Nguyễn on 12/16/20. May use one of the [employer] slots if still available. Thanks.

## 2020-12-15 NOTE — TELEPHONE ENCOUNTER
Patient requesting to be seen sooner than 02/01/2021 for increased GERD per Dr. Tang Avila. I offered an ppointment with Zenobia Tan but patient prefers Dr. Romeo Moe. Patient taking Protonix BID and sleeping with 2 pillows. Please advise.  Thank you home

## 2020-12-16 NOTE — TELEPHONE ENCOUNTER
Notified pt of appt date and time. Pt accepted appt. Informed pt to arrive 15 minutes early. Address given. Pt verbalized understanding. Awaiting Hortencia to open appt.

## 2020-12-21 ENCOUNTER — IMMUNIZATION (OUTPATIENT)
Dept: INTERNAL MEDICINE CLINIC | Facility: CLINIC | Age: 55
End: 2020-12-21

## 2020-12-21 ENCOUNTER — OFFICE VISIT (OUTPATIENT)
Dept: GASTROENTEROLOGY | Facility: CLINIC | Age: 55
End: 2020-12-21

## 2020-12-21 ENCOUNTER — TELEPHONE (OUTPATIENT)
Dept: GASTROENTEROLOGY | Facility: CLINIC | Age: 55
End: 2020-12-21

## 2020-12-21 ENCOUNTER — LAB ENCOUNTER (OUTPATIENT)
Dept: LAB | Facility: HOSPITAL | Age: 55
End: 2020-12-21
Attending: INTERNAL MEDICINE
Payer: COMMERCIAL

## 2020-12-21 VITALS
HEART RATE: 70 BPM | HEIGHT: 63 IN | SYSTOLIC BLOOD PRESSURE: 145 MMHG | BODY MASS INDEX: 33.66 KG/M2 | WEIGHT: 190 LBS | DIASTOLIC BLOOD PRESSURE: 85 MMHG

## 2020-12-21 DIAGNOSIS — E55.9 VITAMIN D DEFICIENCY: ICD-10-CM

## 2020-12-21 DIAGNOSIS — K21.00 GASTROESOPHAGEAL REFLUX DISEASE WITH ESOPHAGITIS, UNSPECIFIED WHETHER HEMORRHAGE: Primary | ICD-10-CM

## 2020-12-21 DIAGNOSIS — E11.9 TYPE 2 DIABETES MELLITUS WITHOUT COMPLICATION, WITHOUT LONG-TERM CURRENT USE OF INSULIN (HCC): ICD-10-CM

## 2020-12-21 DIAGNOSIS — K21.9 GASTROESOPHAGEAL REFLUX DISEASE, UNSPECIFIED WHETHER ESOPHAGITIS PRESENT: ICD-10-CM

## 2020-12-21 DIAGNOSIS — R13.10 DYSPHAGIA, UNSPECIFIED TYPE: Primary | ICD-10-CM

## 2020-12-21 DIAGNOSIS — R07.0 THROAT PAIN: ICD-10-CM

## 2020-12-21 DIAGNOSIS — R77.1 ELEVATED SERUM GLOBULIN LEVEL: ICD-10-CM

## 2020-12-21 DIAGNOSIS — Z00.00 ROUTINE PHYSICAL EXAMINATION: ICD-10-CM

## 2020-12-21 DIAGNOSIS — Z23 NEED FOR VACCINATION: ICD-10-CM

## 2020-12-21 DIAGNOSIS — Z12.11 SCREEN FOR COLON CANCER: ICD-10-CM

## 2020-12-21 PROCEDURE — 82607 VITAMIN B-12: CPT

## 2020-12-21 PROCEDURE — 36415 COLL VENOUS BLD VENIPUNCTURE: CPT

## 2020-12-21 PROCEDURE — 80061 LIPID PANEL: CPT

## 2020-12-21 PROCEDURE — 80053 COMPREHEN METABOLIC PANEL: CPT

## 2020-12-21 PROCEDURE — 99244 OFF/OP CNSLTJ NEW/EST MOD 40: CPT | Performed by: INTERNAL MEDICINE

## 2020-12-21 PROCEDURE — 84443 ASSAY THYROID STIM HORMONE: CPT

## 2020-12-21 PROCEDURE — 83883 ASSAY NEPHELOMETRY NOT SPEC: CPT | Performed by: INTERNAL MEDICINE

## 2020-12-21 PROCEDURE — 86334 IMMUNOFIX E-PHORESIS SERUM: CPT | Performed by: INTERNAL MEDICINE

## 2020-12-21 PROCEDURE — 82043 UR ALBUMIN QUANTITATIVE: CPT

## 2020-12-21 PROCEDURE — 84165 PROTEIN E-PHORESIS SERUM: CPT | Performed by: INTERNAL MEDICINE

## 2020-12-21 PROCEDURE — 81001 URINALYSIS AUTO W/SCOPE: CPT

## 2020-12-21 PROCEDURE — 3079F DIAST BP 80-89 MM HG: CPT | Performed by: INTERNAL MEDICINE

## 2020-12-21 PROCEDURE — 85025 COMPLETE CBC W/AUTO DIFF WBC: CPT

## 2020-12-21 PROCEDURE — 82306 VITAMIN D 25 HYDROXY: CPT

## 2020-12-21 PROCEDURE — 90686 IIV4 VACC NO PRSV 0.5 ML IM: CPT | Performed by: INTERNAL MEDICINE

## 2020-12-21 PROCEDURE — 3077F SYST BP >= 140 MM HG: CPT | Performed by: INTERNAL MEDICINE

## 2020-12-21 PROCEDURE — 83036 HEMOGLOBIN GLYCOSYLATED A1C: CPT

## 2020-12-21 PROCEDURE — 90471 IMMUNIZATION ADMIN: CPT | Performed by: INTERNAL MEDICINE

## 2020-12-21 PROCEDURE — 82570 ASSAY OF URINE CREATININE: CPT

## 2020-12-21 PROCEDURE — 3008F BODY MASS INDEX DOCD: CPT | Performed by: INTERNAL MEDICINE

## 2020-12-21 RX ORDER — SODIUM, POTASSIUM,MAG SULFATES 17.5-3.13G
SOLUTION, RECONSTITUTED, ORAL ORAL
Qty: 1 BOTTLE | Refills: 0 | Status: SHIPPED | OUTPATIENT
Start: 2020-12-21 | End: 2021-01-05 | Stop reason: ALTCHOICE

## 2020-12-21 RX ORDER — OMEPRAZOLE 40 MG/1
40 CAPSULE, DELAYED RELEASE ORAL
Qty: 180 CAPSULE | Refills: 3 | OUTPATIENT
Start: 2020-12-21 | End: 2021-01-05

## 2020-12-21 RX ORDER — OMEPRAZOLE 40 MG/1
40 CAPSULE, DELAYED RELEASE ORAL
Qty: 180 CAPSULE | Refills: 3 | Status: SHIPPED | OUTPATIENT
Start: 2020-12-21 | End: 2020-12-21

## 2020-12-21 NOTE — PATIENT INSTRUCTIONS
Change to omeprazole BID  The pathophysiology of acid reflux was discussed. Discussed patient symptoms and triggers.  Reviewed anti-reflux measures such as raising the head of the bed at night, avoiding tight clothing or belts, avoiding eating late at night

## 2020-12-21 NOTE — H&P
[facility], [facility] - Gastroenterology  Clinic History and Physical     Patient presents with:  Consult: gerd; takes protonix bid; has not helped over past 2-3 months      HPI:   Mary Anne Martinez is a 63year old year-old female patient of Dr. Daniel Mercado, with hi History   Problem Relation Age of Onset   • Hypertension Father    • Lipids Father    • Thyroid disease Father    • Hypertension Mother    • Lipids Mother    • Thyroid disease Mother    • Diabetes Maternal Grandmother    • Diabetes Maternal Grandfather Cyanocobalamin (VITAMIN B-12) 1000 MCG Sublingual SL Tab Place  under the tongue. take 1 Tablet by Sublingual route  every day     • MetFORMIN HCl  MG Oral Tablet 24 Hr Take 1 tablet (500 mg total) by mouth daily.  (Patient not taking: Reported on 1/3 12.6 12.4   HCT 37.5 38.2 37.8   MCV 88.0 86.9 90.2   MCH 28.9 28.7 29.6   MCHC 32.8 33.1 32.8   RDW 14.8 15.1* 14.3   NEPRELIM  --   --  3.21   WBC 5.2 6.0 5.8    317 320.0     Lab Results   Component Value Date     (H) 05/15/2020    BUN 13 the day of the procedure(s) - LOSARTAN   - NO alcohol, recreational drugs nor erectile dysfunction mediations 24 hours before procedure(s)   - NO herbal supplements or weight loss medications x 7 days prior to the procedure(s)    ** If MAC @ [facility] or IV twil

## 2020-12-21 NOTE — TELEPHONE ENCOUNTER
Per today's OV note with Dr Norma Saenz    Recommend:  Change to omeprazole BID    I called [address] and spoke to [facility] PharmD and clarified the omeprazole should be BID.  Dr Norma Saenz,    Please sign new prescription with corrected sig

## 2020-12-21 NOTE — TELEPHONE ENCOUNTER
Scheduled for:  Colonoscopy [address] / EGD [address]  Provider Name:  Dr. Miki Pisano  Date:  2/2/2021  Location:  [facility]  Sedation:  MAC  Time:  1:00 pm, (pt is aware that [facility] will call the day before to confirm arrival time)  Prep:  Suprep  Meds/Allergies Reconciled?:

## 2020-12-21 NOTE — TELEPHONE ENCOUNTER
Pharmacy requesting a call back regarding clarification on medication instructions.  Please call [phone]        Current Outpatient Medications:   •  Omeprazole 40 MG Oral Capsule Delayed Release, Take 1 capsule (40 mg total) by mouth 2 (two) times haily

## 2020-12-22 ENCOUNTER — NURSE ONLY (OUTPATIENT)
Dept: ALLERGY | Facility: CLINIC | Age: 55
End: 2020-12-22

## 2020-12-22 ENCOUNTER — OFFICE VISIT (OUTPATIENT)
Dept: ALLERGY | Facility: CLINIC | Age: 55
End: 2020-12-22

## 2020-12-22 VITALS
DIASTOLIC BLOOD PRESSURE: 86 MMHG | SYSTOLIC BLOOD PRESSURE: 156 MMHG | HEART RATE: 73 BPM | RESPIRATION RATE: 18 BRPM | OXYGEN SATURATION: 97 %

## 2020-12-22 DIAGNOSIS — J30.2 PERENNIAL ALLERGIC RHINITIS WITH SEASONAL VARIATION: Primary | ICD-10-CM

## 2020-12-22 DIAGNOSIS — J30.89 PERENNIAL ALLERGIC RHINITIS WITH SEASONAL VARIATION: Primary | ICD-10-CM

## 2020-12-22 DIAGNOSIS — J30.2 PERENNIAL ALLERGIC RHINITIS WITH SEASONAL VARIATION: ICD-10-CM

## 2020-12-22 DIAGNOSIS — Z91.018 FOOD ALLERGY: ICD-10-CM

## 2020-12-22 DIAGNOSIS — J30.89 PERENNIAL ALLERGIC RHINITIS WITH SEASONAL VARIATION: ICD-10-CM

## 2020-12-22 DIAGNOSIS — J32.9 CHRONIC SINUSITIS, UNSPECIFIED LOCATION: ICD-10-CM

## 2020-12-22 PROCEDURE — 3077F SYST BP >= 140 MM HG: CPT | Performed by: ALLERGY & IMMUNOLOGY

## 2020-12-22 PROCEDURE — 95004 PERQ TESTS W/ALRGNC XTRCS: CPT | Performed by: ALLERGY & IMMUNOLOGY

## 2020-12-22 PROCEDURE — 95024 IQ TESTS W/ALLERGENIC XTRCS: CPT | Performed by: ALLERGY & IMMUNOLOGY

## 2020-12-22 PROCEDURE — 3079F DIAST BP 80-89 MM HG: CPT | Performed by: ALLERGY & IMMUNOLOGY

## 2020-12-22 PROCEDURE — 99244 OFF/OP CNSLTJ NEW/EST MOD 40: CPT | Performed by: ALLERGY & IMMUNOLOGY

## 2020-12-22 RX ORDER — MULTIVIT-MIN/IRON FUM/FOLIC AC 7.5 MG-4
1 TABLET ORAL DAILY
COMMUNITY

## 2020-12-22 RX ORDER — POTASSIUM BICARBONATE 25 MEQ/1
25 TABLET, EFFERVESCENT ORAL 2 TIMES DAILY
COMMUNITY
End: 2021-01-05

## 2020-12-22 RX ORDER — EPINEPHRINE 0.3 MG/.3ML
INJECTION SUBCUTANEOUS
Qty: 1 EACH | Refills: 0 | Status: SHIPPED | OUTPATIENT
Start: 2020-12-22

## 2020-12-22 NOTE — PATIENT INSTRUCTIONS
1. AR  Handouts on allergies and avoidance measures provided and reviewed including the potential treatment option of immunotherapy if individual allergens are detected  Continue with current regimen of Xyzal Astelin and Singulair.   May add sinus rinses as

## 2020-12-22 NOTE — PROGRESS NOTES
David Ortiz is a 63year old female. HPI:   Patient presents with: Allergies: Referred by PCP to see allergist. Irritated/swollen throat and sinus issues.      Patient is a 58-year-old female who presents for allergy consultation upon referral of he Mother    • Diabetes Maternal Grandmother    • Diabetes Maternal Grandfather    • Thyroid disease Sister    • Thyroid disease Brother    • Macular degeneration Neg    • Glaucoma Neg       Social History: Social History    Tobacco Use      Smoking status: N DAILY prn 30 tablet 5   • Cyanocobalamin (VITAMIN B-12) 1000 MCG Sublingual SL Tab Place  under the tongue.  take 1 Tablet by Sublingual route  every day     • Na Sulfate-K Sulfate-Mg Sulf (SUPREP BOWEL PREP KIT) 17.5-3.13-1.6 GM/177ML Oral Solution As dire axillary adenopathy is noted  Respiratory: normal to inspection lungs are clear to auscultation bilaterally normal respiratory effort   Cardiovascular: regular rate and rhythm no murmurs, gallups, or rubs  Abdomen: soft non-tender non-distended  Skin/Hair: related to self administration of these medications. Teaching, instruction and sample was provided to the patient by myself. Teaching included  a review of potential adverse side effects as well as potential efficacy.   Patient's questions were answered i

## 2021-01-05 ENCOUNTER — LAB ENCOUNTER (OUTPATIENT)
Dept: LAB | Facility: HOSPITAL | Age: 56
End: 2021-01-05
Attending: INTERNAL MEDICINE
Payer: COMMERCIAL

## 2021-01-05 ENCOUNTER — OFFICE VISIT (OUTPATIENT)
Dept: INTERNAL MEDICINE CLINIC | Facility: CLINIC | Age: 56
End: 2021-01-05

## 2021-01-05 VITALS
SYSTOLIC BLOOD PRESSURE: 162 MMHG | BODY MASS INDEX: 34.02 KG/M2 | HEART RATE: 72 BPM | TEMPERATURE: 97 F | HEIGHT: 63 IN | WEIGHT: 192 LBS | DIASTOLIC BLOOD PRESSURE: 76 MMHG | RESPIRATION RATE: 18 BRPM

## 2021-01-05 DIAGNOSIS — R30.0 DYSURIA: ICD-10-CM

## 2021-01-05 DIAGNOSIS — J01.01 ACUTE RECURRENT MAXILLARY SINUSITIS: ICD-10-CM

## 2021-01-05 DIAGNOSIS — M54.2 NECK PAIN: ICD-10-CM

## 2021-01-05 DIAGNOSIS — R00.2 PALPITATIONS: ICD-10-CM

## 2021-01-05 DIAGNOSIS — N95.0 PMB (POSTMENOPAUSAL BLEEDING): ICD-10-CM

## 2021-01-05 DIAGNOSIS — E55.9 VITAMIN D DEFICIENCY: ICD-10-CM

## 2021-01-05 DIAGNOSIS — Z91.018 FOOD ALLERGY: ICD-10-CM

## 2021-01-05 DIAGNOSIS — Z00.00 WELLNESS EXAMINATION: ICD-10-CM

## 2021-01-05 DIAGNOSIS — Z23 NEED FOR VACCINATION: ICD-10-CM

## 2021-01-05 DIAGNOSIS — I10 ESSENTIAL HYPERTENSION: ICD-10-CM

## 2021-01-05 DIAGNOSIS — M21.42 FLAT FEET, BILATERAL: ICD-10-CM

## 2021-01-05 DIAGNOSIS — M21.41 FLAT FEET, BILATERAL: ICD-10-CM

## 2021-01-05 DIAGNOSIS — E11.9 TYPE 2 DIABETES MELLITUS WITHOUT COMPLICATION, WITHOUT LONG-TERM CURRENT USE OF INSULIN (HCC): ICD-10-CM

## 2021-01-05 DIAGNOSIS — Z00.00 ROUTINE PHYSICAL EXAMINATION: Primary | ICD-10-CM

## 2021-01-05 LAB
BILIRUB UR QL: NEGATIVE
COLOR UR: YELLOW
GLUCOSE UR-MCNC: NEGATIVE MG/DL
KETONES UR-MCNC: NEGATIVE MG/DL
NITRITE UR QL STRIP.AUTO: NEGATIVE
PH UR: 6 [PH] (ref 5–8)
PROT UR-MCNC: 100 MG/DL
RBC #/AREA URNS AUTO: 967 /HPF
SP GR UR STRIP: 1.02 (ref 1–1.03)
UROBILINOGEN UR STRIP-ACNC: <2
WBC #/AREA URNS AUTO: 1332 /HPF

## 2021-01-05 PROCEDURE — 86003 ALLG SPEC IGE CRUDE XTRC EA: CPT

## 2021-01-05 PROCEDURE — 90472 IMMUNIZATION ADMIN EACH ADD: CPT | Performed by: INTERNAL MEDICINE

## 2021-01-05 PROCEDURE — 90715 TDAP VACCINE 7 YRS/> IM: CPT | Performed by: INTERNAL MEDICINE

## 2021-01-05 PROCEDURE — 87088 URINE BACTERIA CULTURE: CPT

## 2021-01-05 PROCEDURE — 90471 IMMUNIZATION ADMIN: CPT | Performed by: INTERNAL MEDICINE

## 2021-01-05 PROCEDURE — 99396 PREV VISIT EST AGE 40-64: CPT | Performed by: INTERNAL MEDICINE

## 2021-01-05 PROCEDURE — 36415 COLL VENOUS BLD VENIPUNCTURE: CPT

## 2021-01-05 PROCEDURE — 3008F BODY MASS INDEX DOCD: CPT | Performed by: INTERNAL MEDICINE

## 2021-01-05 PROCEDURE — 3077F SYST BP >= 140 MM HG: CPT | Performed by: INTERNAL MEDICINE

## 2021-01-05 PROCEDURE — 99214 OFFICE O/P EST MOD 30 MIN: CPT | Performed by: INTERNAL MEDICINE

## 2021-01-05 PROCEDURE — 90732 PPSV23 VACC 2 YRS+ SUBQ/IM: CPT | Performed by: INTERNAL MEDICINE

## 2021-01-05 PROCEDURE — 87086 URINE CULTURE/COLONY COUNT: CPT

## 2021-01-05 PROCEDURE — 81001 URINALYSIS AUTO W/SCOPE: CPT

## 2021-01-05 PROCEDURE — 87186 SC STD MICRODIL/AGAR DIL: CPT

## 2021-01-05 PROCEDURE — 3078F DIAST BP <80 MM HG: CPT | Performed by: INTERNAL MEDICINE

## 2021-01-05 RX ORDER — MELOXICAM 15 MG/1
TABLET ORAL
Qty: 30 TABLET | Refills: 5 | Status: SHIPPED | OUTPATIENT
Start: 2021-01-05 | End: 2021-07-10

## 2021-01-05 RX ORDER — POTASSIUM BICARBONATE 25 MEQ/1
25 TABLET, EFFERVESCENT ORAL 2 TIMES DAILY
Qty: 180 TABLET | Refills: 1 | Status: CANCELLED | OUTPATIENT
Start: 2021-01-05

## 2021-01-05 RX ORDER — LEVOCETIRIZINE DIHYDROCHLORIDE 5 MG/1
5 TABLET, FILM COATED ORAL NIGHTLY
Qty: 90 TABLET | Refills: 1 | Status: SHIPPED | OUTPATIENT
Start: 2021-01-05 | End: 2021-10-27

## 2021-01-05 RX ORDER — CARVEDILOL 12.5 MG/1
12.5 TABLET ORAL 2 TIMES DAILY WITH MEALS
Qty: 180 TABLET | Refills: 1 | Status: SHIPPED | OUTPATIENT
Start: 2021-01-05 | End: 2021-01-06 | Stop reason: DRUGHIGH

## 2021-01-05 RX ORDER — LOSARTAN POTASSIUM 100 MG/1
100 TABLET ORAL DAILY
Qty: 90 TABLET | Refills: 1 | Status: SHIPPED | OUTPATIENT
Start: 2021-01-05 | End: 2021-05-27 | Stop reason: ALTCHOICE

## 2021-01-05 RX ORDER — OMEPRAZOLE 40 MG/1
40 CAPSULE, DELAYED RELEASE ORAL
Qty: 180 CAPSULE | Refills: 1 | Status: SHIPPED | OUTPATIENT
Start: 2021-01-05 | End: 2021-05-26

## 2021-01-05 RX ORDER — MONTELUKAST SODIUM 10 MG/1
TABLET ORAL
Qty: 90 TABLET | Refills: 1 | Status: SHIPPED | OUTPATIENT
Start: 2021-01-05 | End: 2021-08-06

## 2021-01-05 RX ORDER — CARVEDILOL 6.25 MG/1
6.25 TABLET ORAL 2 TIMES DAILY
Qty: 180 TABLET | Refills: 1 | Status: CANCELLED | OUTPATIENT
Start: 2021-01-05

## 2021-01-05 RX ORDER — HYDROCHLOROTHIAZIDE 25 MG/1
25 TABLET ORAL DAILY
Qty: 90 TABLET | Refills: 1 | Status: SHIPPED | OUTPATIENT
Start: 2021-01-05 | End: 2021-10-27

## 2021-01-05 NOTE — ASSESSMENT & PLAN NOTE
Intermittent episodes of burning with urination over the past 1 week. Recently worse. She has been trying to use over-the-counter Monistat for local itching. UA and culture and sensitivity requested today.   Will consider treatment after results are obta

## 2021-01-05 NOTE — ASSESSMENT & PLAN NOTE
Patient has been menopausal for about 4 to 5 years. She does have a history of fibroids. About 2 years back was noted to have thickening of the endometrial lining and had a normal endometrial biopsy.   Complains of pink vaginal discharge on and off recent

## 2021-01-05 NOTE — ASSESSMENT & PLAN NOTE
Towards the end of the visit patient complains of flat feet–feet are inturned. She is borderline diabetic in addition.   Referral for podiatry for evaluation provided

## 2021-01-05 NOTE — PATIENT INSTRUCTIONS
Problem List Items Addressed This Visit        Unprioritized    Dysuria     Intermittent episodes of burning with urination over the past 1 week. Recently worse. She has been trying to use over-the-counter Monistat for local itching.   UA and culture and STRESS, ADULT (CPT=93017)    PMB (postmenopausal bleeding)     Patient has been menopausal for about 4 to 5 years. She does have a history of fibroids.   About 2 years back was noted to have thickening of the endometrial lining and had a normal endometrial does.    Blood pressure (!) 162/76, pulse 72, temperature 97.4 °F (36.3 °C), temperature source Axillary, resp. rate 18, height 5' 3\" (1.6 m), weight 192 lb (87.1 kg), not currently breastfeeding.      Blood pressures remain quite elevated even upon rechec

## 2021-01-05 NOTE — ASSESSMENT & PLAN NOTE
Blood pressures are elevated. She is on losartan hydrochlorothiazide 100 with 25 mg of hydrochlorothiazide and Coreg 6.25 mg twice daily . She has had recurrent palpitations as well as shortness of breath.   Labs completed recently looked normal.  She has

## 2021-01-05 NOTE — ASSESSMENT & PLAN NOTE
History of chronic intermittent palpitations. She was seen by Dr. Marie Cota in the past and was ordered a Holter monitor. This was incomplete due to insurance reasons. She will contact cardiology for a follow-up visit and complete testing.   Referral has been

## 2021-01-05 NOTE — PROGRESS NOTES
HPI:   Eulalia Morales is a 63year old female who presents for an Annual Health Visit. Additional problems–  Burning with urination for the past 3 to 4 days. Discomfort in the vaginal area and vaginal bleeding–pink discharge for the past 1 week.   Toni Cowan mellitus. Her disease course has been stable with slight fluctuations in the hemoglobin A1c. She has not been taking her Metformin as directed. There are no hypoglycemic associated symptoms. There are no diabetic associated symptoms.  There are no hypoglyc Vitamins-Minerals (MULTI-VITAMIN/MINERALS) Oral Tab Take 1 tablet by mouth daily.      • EPINEPHrine (EPIPEN 2-MARLENE) 0.3 MG/0.3ML Injection Solution Auto-injector Inject IM in event of  allergic reaction 1 each 0   • MECLIZINE HCL 25 MG Oral Tab TAKE 1 TABLE Sister    • Thyroid disease Brother    • Macular degeneration Neg    • Glaucoma Neg       SOCIAL HISTORY   Social History    Tobacco Use      Smoking status: Never Smoker      Smokeless tobacco: Never Used    Alcohol use: No      Alcohol/week: 0.0 standard Cardiovascular: Normal rate, regular rhythm, normal heart sounds and intact distal pulses. No murmur heard. Pulmonary/Chest: Effort normal and breath sounds normal. She has no wheezes. She has no rales. She exhibits no mass and no tenderness.  Right br insulin (HCC)  -     OPHTHALMOLOGY - INTERNAL  -     CBC WITH DIFFERENTIAL WITH PLATELET; Future  -     COMP METABOLIC PANEL (14); Future  -     LIPID PANEL;  Future  -     HEMOGLOBIN A1C; Future    Vitamin D deficiency    Wellness examination    Acute recu borderline diabetic in addition. Referral for podiatry for evaluation provided         Relevant Orders    PODIATRY - INTERNAL    Hypertension     Blood pressures are elevated.   She is on losartan hydrochlorothiazide 100 with 25 mg of hydrochlorothiazide a Routine physical examination - Primary     Normal exam.  Labs as ordered. Skin check normal.  No significant abnormal nevi. Breast exam completed–no palpable abnormalities, discharge from the nipples or axillary adenopathy.   Mammogram due on 05/26/ referral provided. Relevant Orders    OPHTHALMOLOGY - INTERNAL    CBC WITH DIFFERENTIAL WITH PLATELET    COMP METABOLIC PANEL (14)    LIPID PANEL    HEMOGLOBIN A1C    Vitamin D deficiency     This has been well supplemented.   May continue on over

## 2021-01-05 NOTE — ASSESSMENT & PLAN NOTE
Hemoglobin A1c is slightly higher at 6.5. Lipid panel shows borderline elevation in the LDL cholesterol. Patient has not been taking her Metformin. She does not want to start on cholesterol-lowering medications at this time.   She feels she did not watch

## 2021-01-05 NOTE — ASSESSMENT & PLAN NOTE
Normal exam.  Labs as ordered. Skin check normal.  No significant abnormal nevi. Breast exam completed–no palpable abnormalities, discharge from the nipples or axillary adenopathy.   Mammogram due on 05/26/2021  dexa scan due in 2022  No cervical or ingui

## 2021-01-06 ENCOUNTER — TELEPHONE (OUTPATIENT)
Dept: INTERNAL MEDICINE CLINIC | Facility: CLINIC | Age: 56
End: 2021-01-06

## 2021-01-06 RX ORDER — CARVEDILOL 6.25 MG/1
9.38 TABLET ORAL 2 TIMES DAILY WITH MEALS
Qty: 135 TABLET | Refills: 1 | Status: SHIPPED | OUTPATIENT
Start: 2021-01-06 | End: 2021-10-27

## 2021-01-06 RX ORDER — CIPROFLOXACIN 500 MG/1
TABLET, FILM COATED ORAL
Qty: 10 TABLET | Refills: 0 | Status: SHIPPED | OUTPATIENT
Start: 2021-01-06 | End: 2021-02-02

## 2021-01-06 RX ORDER — CIPROFLOXACIN 500 MG/1
TABLET, FILM COATED ORAL
Qty: 10 TABLET | Refills: 0 | Status: SHIPPED | OUTPATIENT
Start: 2021-01-06 | End: 2021-01-06

## 2021-01-06 NOTE — TELEPHONE ENCOUNTER
I left a message saying that I read doctors notes and it says 2 week return for BP check. The appmt for 1/18 is within 2 wks. I said with medication adjustments some time is needed.   I also said to call back with any questions

## 2021-01-06 NOTE — TELEPHONE ENCOUNTER
Verified name and . Patient calling to follow up on test results for urinalysis and urine culture resulted 20. She is requesting recommendations from Dr. Sharyle Mealing in what her next steps should be as she is dealing with the urinary symptoms.     Ple

## 2021-01-06 NOTE — TELEPHONE ENCOUNTER
Pt has an appointment 1-18-21 for a nurse visit for blood pressure check. Patient is also suppose to bring her blood pressure monitor. Pt would like to know if it is ok to come in on 2-9-21 instead. Pt would like to confirm that it is ok to wait that long.

## 2021-01-06 NOTE — TELEPHONE ENCOUNTER
Pt calling again and states she is still having urinary symptoms and burning is bad, requesting Rx for Pyridium.  She states she did receive the results of the urine culture, I explained that it is a preliminary results and awaiting sensitivity to determine

## 2021-01-06 NOTE — TELEPHONE ENCOUNTER
Per patient she received her result vis Mychart about UA and need to ask more question due to her symptom is still on.

## 2021-01-07 LAB
ALMOND IGE QN: <0.1 KUA/L (ref ?–0.1)
COCONUT IGE QN: <0.1 KUA/L (ref ?–0.1)

## 2021-01-07 NOTE — TELEPHONE ENCOUNTER
Sensitivity results not yet in. But there is a mild UTI with E. coli. Start on Cipro 500 mg twice daily for 5 days. Prescription has been sent to the pharmacy. Advised to stop the tizanidine which is a muscle relaxant when on Cipro.   Take this medicati

## 2021-01-07 NOTE — TELEPHONE ENCOUNTER
Change coreg to 6.25 mgs 1 1/2 tabs rather than 2 tabs 2 times a day-so dose will be slightly lower.   Check bp 2 times a day and write down and send on my chart once a week

## 2021-01-07 NOTE — TELEPHONE ENCOUNTER
Spoke with patient ( verified) and relayed Dr. Jac Pérez message below--patient verbalizes understanding and agreement.  Cipro re-ordered and sent to [address] [place] [address] per patient request.    Patient requests message to be sent back to Dr. Estella Joshi:    Vivien Vivas doub

## 2021-01-08 ENCOUNTER — TELEPHONE (OUTPATIENT)
Dept: ALLERGY | Facility: CLINIC | Age: 56
End: 2021-01-08

## 2021-01-08 NOTE — TELEPHONE ENCOUNTER
RN called patient to go over results and recommendations.  Patient verbalizes understanding and has no further questions.     ----- Message from Kristine Toledo MD sent at 1/8/2021 10:29 AM CST -----  Please call patient with recent serum IgE testing to se

## 2021-01-18 ENCOUNTER — NURSE ONLY (OUTPATIENT)
Dept: INTERNAL MEDICINE CLINIC | Facility: CLINIC | Age: 56
End: 2021-01-18

## 2021-01-18 VITALS — HEART RATE: 72 BPM | DIASTOLIC BLOOD PRESSURE: 74 MMHG | SYSTOLIC BLOOD PRESSURE: 138 MMHG

## 2021-01-18 DIAGNOSIS — Z01.30 BP CHECK: Primary | ICD-10-CM

## 2021-01-18 PROCEDURE — 3075F SYST BP GE 130 - 139MM HG: CPT | Performed by: INTERNAL MEDICINE

## 2021-01-18 PROCEDURE — 3078F DIAST BP <80 MM HG: CPT | Performed by: INTERNAL MEDICINE

## 2021-01-18 NOTE — PROGRESS NOTES
Patient was in today for a scheduled nurse visit to have his BP check per PCP Maldonado Yeung. Patient name, , and allergies verified. Patient was asked to sit in the room for 10 minutes prior to having his BP check.  BP was checked using a large cuff on he (100 mg total) by mouth daily. , Disp: 90 tablet, Rfl: 1    •  Meloxicam 15 MG Oral Tab, TAKE 1/2 - 1 tablet BY MOUTH ONCE DAILY prn, Disp: 30 tablet, Rfl: 5    •  Omeprazole 40 MG Oral Capsule Delayed Release, Take 1 capsule (40 mg total) by mouth 2 (two)

## 2021-01-25 ENCOUNTER — TELEPHONE (OUTPATIENT)
Dept: GASTROENTEROLOGY | Facility: CLINIC | Age: 56
End: 2021-01-25

## 2021-01-25 NOTE — TELEPHONE ENCOUNTER
Should hold PPI for 10-14 days  Can take tums for break through and reflux precautions     raising the head of the bed at night, avoiding tight clothing or belts, avoiding eating late at night and not lying down shortly after mealtime and achieving weight

## 2021-01-25 NOTE — TELEPHONE ENCOUNTER
pt. wants to know if there are any cancellations this week to move her proc up? Pt. also needs instructions and she has questions about the medication that she can take or need to stop? Carmina Martinez

## 2021-01-25 NOTE — TELEPHONE ENCOUNTER
RN contacted pt to relay message below below per Dr. Sanjana Flowers with recommendations and med instructions. Pt voiced understanding with no further questions or concerns. TE closed.

## 2021-01-25 NOTE — TELEPHONE ENCOUNTER
Dr. Kelsey Prado-    Pt was told to hold Omeprazole 1X week prior to scheduled EGD/CLN (02/02/21) as per pt states she is being tested again for H. Pylori on procedure biopsies.  Pt wants to confirm with MD that it is 1X week to hold Omeprazole as pt forgot what i

## 2021-01-26 ENCOUNTER — TELEPHONE (OUTPATIENT)
Dept: GASTROENTEROLOGY | Facility: CLINIC | Age: 56
End: 2021-01-26

## 2021-01-26 ENCOUNTER — TELEPHONE (OUTPATIENT)
Dept: INTERNAL MEDICINE CLINIC | Facility: CLINIC | Age: 56
End: 2021-01-26

## 2021-01-26 NOTE — TELEPHONE ENCOUNTER
Patient contacts clinic stating Dr. Evens Cruz had ordered an echocardiogram for routine screening. She is being told by GI staff that she cannot have colonoscopy without that procedure. Patient would like to know if it is in fact needed for clearance.   If n

## 2021-01-26 NOTE — TELEPHONE ENCOUNTER
Dr. Randa Bazzi    I contacted patient and let her know that per [facility] Anesthesia team the stress test would need to be completed prior to the procedure (procedure scheduled 2/2/2021).     Patient reports she reached out to Dr. Ted Galicia office for clearance to pr

## 2021-01-26 NOTE — TELEPHONE ENCOUNTER
Received call from Steve Patino at [facility]. Per chart review, patient saw Dr. Christine Gaxiola on 1/5/2021 and a stress test was ordered for palpitations. Patient did not yet get it done stating too short of notice per Kirti.   Per anesthesia, stress test would need to b

## 2021-01-27 ENCOUNTER — TELEPHONE (OUTPATIENT)
Dept: CARDIOLOGY CLINIC | Facility: CLINIC | Age: 56
End: 2021-01-27

## 2021-01-27 ENCOUNTER — TELEPHONE (OUTPATIENT)
Dept: GASTROENTEROLOGY | Facility: CLINIC | Age: 56
End: 2021-01-27

## 2021-01-27 NOTE — TELEPHONE ENCOUNTER
Spoke with Thee, she was here last year for palpitations but wasn't able to finish holter monitor testing  due to insurance. Appointment made with Kennedi SCHROEDER, patient is asking for EKG and Holter monitor. Has intermittent palpitations.  Patient already has stress test schedule next week 2/5/21

## 2021-01-27 NOTE — TELEPHONE ENCOUNTER
We had ordered her stress test for palpitations. She does have chronic reflux. I would prefer that she gets the stress test done before she has the EGD. Please let the patient know.

## 2021-01-27 NOTE — TELEPHONE ENCOUNTER
She is getting the stress test which is better than an EKG and hence does not need an EKG. For the Holter monitor-that will be ordered per cardiology.

## 2021-01-27 NOTE — TELEPHONE ENCOUNTER
Rescheduled for:  Colonoscopy - 25779 & EGD - 11399  Provider Name:  Dr. Gigi Cavazos  Date:  FROM - 2/2/21               TO - 3/2/21  Location:  [facility]  Sedation:  MAC  Time:  FROM - 1:00 pm            Approx 10:00 am (pt is aware that [address] will call with arrival ti

## 2021-01-27 NOTE — TELEPHONE ENCOUNTER
Verified name and . Patient was informed of Dr. Raul Ramsay message. Patient verbalizes understanding and agrees with plan. Provided number for central scheduling [phone].

## 2021-01-27 NOTE — TELEPHONE ENCOUNTER
The order was not for an echocardiogram.  The order was for a stress test.  Given her problems with palpitations–if she is going for a procedure under anesthesia–she will need to get this completed first.  Her procedure is scheduled for February 2–please h

## 2021-01-28 NOTE — TELEPHONE ENCOUNTER
See TE from 01/27/2021. Pt's CLN/EGD procedure with Dr. New Casillas was rescheduled to 03/02/2021. Pt is having cardiac stress test done on 02/05/2021.  Based off these results and if Dr. Josee Verduzco clears her to proceed with GI procedure will be OK to proceed with s

## 2021-02-02 ENCOUNTER — LAB ENCOUNTER (OUTPATIENT)
Dept: LAB | Age: 56
End: 2021-02-02
Attending: INTERNAL MEDICINE
Payer: COMMERCIAL

## 2021-02-02 ENCOUNTER — OFFICE VISIT (OUTPATIENT)
Dept: CARDIOLOGY CLINIC | Facility: CLINIC | Age: 56
End: 2021-02-02

## 2021-02-02 ENCOUNTER — APPOINTMENT (OUTPATIENT)
Dept: LAB | Facility: HOSPITAL | Age: 56
End: 2021-02-02
Attending: INTERNAL MEDICINE
Payer: COMMERCIAL

## 2021-02-02 VITALS
WEIGHT: 189 LBS | SYSTOLIC BLOOD PRESSURE: 115 MMHG | BODY MASS INDEX: 33.49 KG/M2 | RESPIRATION RATE: 18 BRPM | HEIGHT: 63 IN | HEART RATE: 68 BPM | DIASTOLIC BLOOD PRESSURE: 77 MMHG

## 2021-02-02 DIAGNOSIS — R07.9 CHEST PAIN, UNSPECIFIED TYPE: Primary | ICD-10-CM

## 2021-02-02 DIAGNOSIS — R06.00 DYSPNEA, UNSPECIFIED TYPE: ICD-10-CM

## 2021-02-02 LAB — NT-PROBNP SERPL-MCNC: 35 PG/ML (ref ?–125)

## 2021-02-02 PROCEDURE — 3008F BODY MASS INDEX DOCD: CPT | Performed by: NURSE PRACTITIONER

## 2021-02-02 PROCEDURE — 3074F SYST BP LT 130 MM HG: CPT | Performed by: NURSE PRACTITIONER

## 2021-02-02 PROCEDURE — 36415 COLL VENOUS BLD VENIPUNCTURE: CPT

## 2021-02-02 PROCEDURE — 99214 OFFICE O/P EST MOD 30 MIN: CPT | Performed by: NURSE PRACTITIONER

## 2021-02-02 PROCEDURE — 3078F DIAST BP <80 MM HG: CPT | Performed by: NURSE PRACTITIONER

## 2021-02-02 PROCEDURE — 83880 ASSAY OF NATRIURETIC PEPTIDE: CPT

## 2021-02-02 NOTE — PROGRESS NOTES
Mariam Don is a 63year old female. Patient presents with: Follow - Up  Dyspnea: on exertion  Edema: hands/ankles  Palpitations  Chest Pressure    HPI:   Patient comes in today for follow-up.  She sees Dr. Issa Wyatt 2 years ago at that time she was having p TABLET BY MOUTH AT BEDTIME 90 tablet 1   • EPINEPHrine (EPIPEN 2-MARLENE) 0.3 MG/0.3ML Injection Solution Auto-injector Inject IM in event of  allergic reaction 1 each 0   • MECLIZINE HCL 25 MG Oral Tab TAKE 1 TABLET BY MOUTH THREE TIMES DAILY AS NEEDED FOR DI developed, well nourished,in no apparent distress  SKIN: no rashes,no suspicious lesions  HEENT: atraumatic, normocephalic,ears and throat are clear  NECK: supple,no adenopathy,no bruits  LUNGS: clear to auscultation  CARDIO: Regular rate and rhythm  GI: g

## 2021-02-08 ENCOUNTER — HOSPITAL ENCOUNTER (OUTPATIENT)
Dept: NUCLEAR MEDICINE | Facility: HOSPITAL | Age: 56
Discharge: HOME | End: 2021-02-08
Attending: NURSE PRACTITIONER
Payer: COMMERCIAL

## 2021-02-08 ENCOUNTER — TELEPHONE (OUTPATIENT)
Dept: OBGYN CLINIC | Facility: CLINIC | Age: 56
End: 2021-02-08

## 2021-02-08 ENCOUNTER — HOSPITAL ENCOUNTER (OUTPATIENT)
Dept: CV DIAGNOSTICS | Facility: HOSPITAL | Age: 56
Discharge: HOME | End: 2021-02-08
Attending: NURSE PRACTITIONER
Payer: COMMERCIAL

## 2021-02-08 DIAGNOSIS — R07.9 CHEST PAIN, UNSPECIFIED TYPE: ICD-10-CM

## 2021-02-08 DIAGNOSIS — R06.00 DYSPNEA, UNSPECIFIED TYPE: ICD-10-CM

## 2021-02-08 PROCEDURE — 93227 XTRNL ECG REC<48 HR R&I: CPT | Performed by: NURSE PRACTITIONER

## 2021-02-08 PROCEDURE — 78452 HT MUSCLE IMAGE SPECT MULT: CPT | Performed by: NURSE PRACTITIONER

## 2021-02-08 PROCEDURE — 93016 CV STRESS TEST SUPVJ ONLY: CPT | Performed by: NURSE PRACTITIONER

## 2021-02-08 PROCEDURE — 93017 CV STRESS TEST TRACING ONLY: CPT | Performed by: NURSE PRACTITIONER

## 2021-02-08 PROCEDURE — 93225 XTRNL ECG REC<48 HRS REC: CPT | Performed by: NURSE PRACTITIONER

## 2021-02-08 PROCEDURE — 93018 CV STRESS TEST I&R ONLY: CPT | Performed by: NURSE PRACTITIONER

## 2021-02-09 ENCOUNTER — TELEPHONE (OUTPATIENT)
Dept: CARDIOLOGY CLINIC | Facility: CLINIC | Age: 56
End: 2021-02-09

## 2021-02-09 DIAGNOSIS — I10 HYPERTENSION, UNSPECIFIED TYPE: Primary | ICD-10-CM

## 2021-02-09 DIAGNOSIS — R06.00 DYSPNEA, UNSPECIFIED TYPE: ICD-10-CM

## 2021-02-09 NOTE — TELEPHONE ENCOUNTER
LMTCB     Hui Freire APRN [facility] Clinical Staff             Stress test was normal, continue present management.  See primary for other causes of dyspnea, if none found may consider other cardiac testing

## 2021-02-10 NOTE — TELEPHONE ENCOUNTER
Kennedi please advise    Patient called back, reviewed stress test results with her. She still has intermittent shortness of breath at rest and on exertion. 48 hrs holter is pending. Would echo be appropriate? Patient is asking.

## 2021-02-11 ENCOUNTER — OFFICE VISIT (OUTPATIENT)
Dept: OBGYN CLINIC | Facility: CLINIC | Age: 56
End: 2021-02-11

## 2021-02-11 VITALS
SYSTOLIC BLOOD PRESSURE: 128 MMHG | WEIGHT: 195 LBS | HEART RATE: 66 BPM | BODY MASS INDEX: 35 KG/M2 | DIASTOLIC BLOOD PRESSURE: 86 MMHG

## 2021-02-11 DIAGNOSIS — N95.2 VAGINAL ATROPHY: ICD-10-CM

## 2021-02-11 DIAGNOSIS — N90.89 VULVAR IRRITATION: ICD-10-CM

## 2021-02-11 DIAGNOSIS — N95.0 POSTMENOPAUSAL BLEEDING: Primary | ICD-10-CM

## 2021-02-11 PROCEDURE — 3074F SYST BP LT 130 MM HG: CPT | Performed by: OBSTETRICS & GYNECOLOGY

## 2021-02-11 PROCEDURE — 3079F DIAST BP 80-89 MM HG: CPT | Performed by: OBSTETRICS & GYNECOLOGY

## 2021-02-11 PROCEDURE — 99213 OFFICE O/P EST LOW 20 MIN: CPT | Performed by: OBSTETRICS & GYNECOLOGY

## 2021-02-11 RX ORDER — ESTRADIOL 0.1 MG/G
1 CREAM VAGINAL
Qty: 1 TUBE | Refills: 5 | Status: SHIPPED | OUTPATIENT
Start: 2021-02-12

## 2021-02-11 RX ORDER — SODIUM, POTASSIUM,MAG SULFATES 17.5-3.13G
SOLUTION, RECONSTITUTED, ORAL ORAL
COMMUNITY
Start: 2021-01-05 | End: 2021-04-08

## 2021-02-11 NOTE — PROGRESS NOTES
Lizy Forte is a 63year old female  No LMP recorded. (Menstrual status: Menopause).  Patient presents with:  Gyn Problem: no bleeding for 5 years Mehdi 1 started to bleed with some burning sensation      Pt is a 63yo female who presents today for Highest education level: Not on file    Occupational History      Not on file    Social Needs      Financial resource strain: Not on file      Food insecurity        Worry: Not on file        Inability: Not on file      Transportation needs        Medical: vaginally 3 (three) times a week., Disp: 1 Tube, Rfl: 5  •  carvedilol (COREG) 6.25 MG Oral Tab, Take 1.5 tablets (9.375 mg total) by mouth 2 (two) times daily with meals. , Disp: 135 tablet, Rfl: 1  •  hydrochlorothiazide 25 MG Oral Tab, Take 1 tablet (25 ANAPHYLAXIS  Sulfa Antibiotics       RASH, SWELLING, SHORTNESS OF BREATH      Review of Systems:  Constitutional:  Denies fevers and chills   Cardiovascular:  denies chest pain or palpitations  Respiratory:  denies shortness of breath  Gastrointesti endometrial lining. Reviewed if 4mm or less and no further bleeding then does not need further workup. 2. Reviewed bleeding could have been from UTI that was treated  3. Vaginal culture for irritation.  Reviewed that yeast and BV uncommon but could cause

## 2021-02-12 NOTE — TELEPHONE ENCOUNTER
Dr. Daniel Mercado-    Pt had stress test on 02/08/2021. Based off results, does pt have cardiac clearance to proceed with scheduled CLN/EGD procedure on 03/02/2021. This is high priority as patient is scheduled for GI procedure next week.      Please advis

## 2021-02-13 LAB
GENITAL VAGINOSIS SCREEN: NEGATIVE
TRICHOMONAS SCREEN: NEGATIVE

## 2021-02-22 NOTE — TELEPHONE ENCOUNTER
Lexiscan stress test looked normal with regards to perfusion. Holter monitor did not show any significant abnormalities. Patient is cleared for EGD/colonoscopy.

## 2021-02-22 NOTE — TELEPHONE ENCOUNTER
Noted. Thank you! Contacted patient. Verified with patient, per Dr. Sarita Gunter and cardiac workup, OK to proceed with CLN/EGD procedure 03/02/2021. Patient verbalized understanding with no further questions or concerns.      Closing TE.

## 2021-02-23 ENCOUNTER — HOSPITAL ENCOUNTER (OUTPATIENT)
Dept: CV DIAGNOSTICS | Facility: HOSPITAL | Age: 56
Discharge: HOME | End: 2021-02-23
Attending: NURSE PRACTITIONER
Payer: COMMERCIAL

## 2021-02-23 ENCOUNTER — HOSPITAL ENCOUNTER (OUTPATIENT)
Dept: ULTRASOUND IMAGING | Facility: HOSPITAL | Age: 56
Discharge: HOME | End: 2021-02-23
Attending: OBSTETRICS & GYNECOLOGY
Payer: COMMERCIAL

## 2021-02-23 DIAGNOSIS — R06.00 DYSPNEA, UNSPECIFIED TYPE: ICD-10-CM

## 2021-02-23 DIAGNOSIS — I10 HYPERTENSION, UNSPECIFIED TYPE: ICD-10-CM

## 2021-02-23 DIAGNOSIS — N95.0 POSTMENOPAUSAL BLEEDING: ICD-10-CM

## 2021-02-23 PROCEDURE — 93306 TTE W/DOPPLER COMPLETE: CPT | Performed by: NURSE PRACTITIONER

## 2021-02-23 PROCEDURE — 76830 TRANSVAGINAL US NON-OB: CPT | Performed by: OBSTETRICS & GYNECOLOGY

## 2021-02-23 PROCEDURE — 76856 US EXAM PELVIC COMPLETE: CPT | Performed by: OBSTETRICS & GYNECOLOGY

## 2021-02-25 ENCOUNTER — TELEPHONE (OUTPATIENT)
Dept: OBGYN UNIT | Facility: HOSPITAL | Age: 56
End: 2021-02-25

## 2021-02-25 NOTE — TELEPHONE ENCOUNTER
Pt informed of KCbs recs below and verbalized understanding. Pt familiar with EMBX procedure and had this done in 2018. (results were negative). Pt stated that she would prefer to see a female provider.  Pt asked which female MDs we had in the office and pt

## 2021-02-25 NOTE — TELEPHONE ENCOUNTER
Left message for patient. Pt with thickened endometrial lining for postmenopausal patient. Lining is 6.1mm and she did have some bleeding in the past. Recommendation is for EMB.  She can have EMB done with any provider- please ok it with them first.

## 2021-02-26 RX ORDER — MISOPROSTOL 200 UG/1
TABLET ORAL
Qty: 2 TABLET | Refills: 0 | Status: SHIPPED | OUTPATIENT
Start: 2021-02-26 | End: 2021-11-04

## 2021-02-26 RX ORDER — MISOPROSTOL 200 UG/1
TABLET ORAL
Qty: 2 TABLET | Refills: 0 | Status: SHIPPED | OUTPATIENT
Start: 2021-02-26 | End: 2021-02-26

## 2021-02-26 NOTE — TELEPHONE ENCOUNTER
Given that patient had cervical stenosis with KCB during embx back in 2018, I would like pt to take cytotec 2 tabs night before & I prefer doing endosee, possible biopsy. EMBx can miss polyp w/in cavity.  Schedule for 1 pm whenever I am going on call & do n

## 2021-02-26 NOTE — TELEPHONE ENCOUNTER
Informed pt that NJ would like to do an endosee, possible biopsy. Pt scheduled for 3/22/21 at 1300. Scheduled for NJG and procedure room. Informed pt that cytotec sent to the pharmacy to take the night before the procedure.   Pt prefers motrin and will ta

## 2021-02-27 ENCOUNTER — LAB REQUISITION (OUTPATIENT)
Dept: LAB | Facility: HOSPITAL | Age: 56
End: 2021-02-27
Payer: COMMERCIAL

## 2021-02-27 DIAGNOSIS — Z01.818 ENCOUNTER FOR OTHER PREPROCEDURAL EXAMINATION: ICD-10-CM

## 2021-02-28 LAB — SARS-COV-2 RNA RESP QL NAA+PROBE: NOT DETECTED

## 2021-03-01 ENCOUNTER — TELEPHONE (OUTPATIENT)
Dept: ALLERGY | Facility: CLINIC | Age: 56
End: 2021-03-01

## 2021-03-01 NOTE — TELEPHONE ENCOUNTER
Krish Abrams,     I had previously seen Shane Latham in a few months ago when she did mention some symptoms of globus and choking on foods on occasion. No history of food impaction. Patient sent me a message that she will be having EGD and colonoscopy tomorrow with you. Patient just wanted me to send you a message to potentially be on the look out for eosinophilic esophagitis. I know she is in good hands but I promised her  I would send you a message.     Billie Gusman MD   Allergy

## 2021-03-01 NOTE — TELEPHONE ENCOUNTER
Dr. Cecile Sloan physician in [facility] reports she is having a colonoscopy and endoscopy tomorrow. She remembered at the office visit with Dr. West Dia in December he had written on her note to check for EoE if she did proceed with an endoscopy and colonoscopy. She wanted to find out if Dr. West Dia could send a message to Dr. Yury Chahal regarding checking for EoE? She reports you had hand wrote a note on the top of her AVS from her visit with us. She just wants to be sure her bases are covered since she is getting the procedure done tomorrow.      Routed to Dr. West Dia

## 2021-03-01 NOTE — TELEPHONE ENCOUNTER
Patient asking for a call back regarding information for GI doctor she is having a procedure done tomorrow.

## 2021-03-02 ENCOUNTER — LAB REQUISITION (OUTPATIENT)
Dept: LAB | Facility: HOSPITAL | Age: 56
End: 2021-03-02
Payer: COMMERCIAL

## 2021-03-02 ENCOUNTER — SURGERY CENTER DOCUMENTATION (OUTPATIENT)
Dept: SURGERY | Age: 56
End: 2021-03-02

## 2021-03-02 DIAGNOSIS — R19.7 DIARRHEA, UNSPECIFIED: ICD-10-CM

## 2021-03-02 DIAGNOSIS — R13.10 DYSPHAGIA, UNSPECIFIED: ICD-10-CM

## 2021-03-02 PROCEDURE — 45380 COLONOSCOPY AND BIOPSY: CPT | Performed by: INTERNAL MEDICINE

## 2021-03-02 PROCEDURE — 88312 SPECIAL STAINS GROUP 1: CPT | Performed by: INTERNAL MEDICINE

## 2021-03-02 PROCEDURE — 43251 EGD REMOVE LESION SNARE: CPT | Performed by: INTERNAL MEDICINE

## 2021-03-02 PROCEDURE — 43239 EGD BIOPSY SINGLE/MULTIPLE: CPT | Performed by: INTERNAL MEDICINE

## 2021-03-02 PROCEDURE — 88305 TISSUE EXAM BY PATHOLOGIST: CPT | Performed by: INTERNAL MEDICINE

## 2021-03-02 NOTE — PROCEDURES
ESOPHAGOGASTRODUODENOSCOPY (EGD) & COLONOSCOPY REPORT    Gita Rivera      Age 63year old   PCP Maldonado Yeung MD Endoscopist Sandra Jhaveri MD     Date of procedure: 21    Procedure: EGD w/ biopsies & Colonoscopy w/cold biopsy    Pre-op obtained. The bowel prep was good. Views of the colon were good with washing. I then carefully withdrew the instrument from the patient who tolerated the procedure well. Complications: None    EGD findings:      1. Esophagus:  The squamocolumnar junctio

## 2021-03-06 RX ORDER — MECLIZINE HYDROCHLORIDE 25 MG/1
TABLET ORAL
Qty: 20 TABLET | Refills: 0 | Status: SHIPPED | OUTPATIENT
Start: 2021-03-06 | End: 2021-09-02

## 2021-03-13 NOTE — ASSESSMENT & PLAN NOTE
Patient has been treated with Keflex per ER evaluation. She did have some hematuria. Urine culture however did not show any infection– may discontinue antibiotics if no symptoms. Drink plenty of fluids to keep hydrated.   CT kidneys–renal stone protocol I have reviewed and confirmed nurses' notes...

## 2021-03-22 ENCOUNTER — OFFICE VISIT (OUTPATIENT)
Dept: OBGYN CLINIC | Facility: CLINIC | Age: 56
End: 2021-03-22

## 2021-03-22 VITALS
DIASTOLIC BLOOD PRESSURE: 88 MMHG | HEART RATE: 71 BPM | SYSTOLIC BLOOD PRESSURE: 162 MMHG | BODY MASS INDEX: 35 KG/M2 | WEIGHT: 195 LBS

## 2021-03-22 DIAGNOSIS — N84.0 ENDOMETRIAL POLYP: ICD-10-CM

## 2021-03-22 DIAGNOSIS — R93.89 INCREASED ENDOMETRIAL STRIPE THICKNESS: Primary | ICD-10-CM

## 2021-03-22 DIAGNOSIS — N84.1 CERVICAL POLYP: ICD-10-CM

## 2021-03-22 DIAGNOSIS — N95.0 POSTMENOPAUSAL BLEEDING: ICD-10-CM

## 2021-03-22 DIAGNOSIS — N88.2 CERVICAL STENOSIS (UTERINE CERVIX): ICD-10-CM

## 2021-03-22 DIAGNOSIS — D21.9 FIBROID: ICD-10-CM

## 2021-03-22 PROCEDURE — 3079F DIAST BP 80-89 MM HG: CPT | Performed by: OBSTETRICS & GYNECOLOGY

## 2021-03-22 PROCEDURE — 3077F SYST BP >= 140 MM HG: CPT | Performed by: OBSTETRICS & GYNECOLOGY

## 2021-03-22 PROCEDURE — 58555 HYSTEROSCOPY DX SEP PROC: CPT | Performed by: OBSTETRICS & GYNECOLOGY

## 2021-03-22 PROCEDURE — 99213 OFFICE O/P EST LOW 20 MIN: CPT | Performed by: OBSTETRICS & GYNECOLOGY

## 2021-03-24 ENCOUNTER — TELEPHONE (OUTPATIENT)
Dept: OBGYN CLINIC | Facility: CLINIC | Age: 56
End: 2021-03-24

## 2021-03-24 DIAGNOSIS — N84.0 ENDOMETRIAL POLYP: ICD-10-CM

## 2021-03-24 DIAGNOSIS — N84.1 CERVICAL POLYP: Primary | ICD-10-CM

## 2021-03-24 DIAGNOSIS — N88.2 CERVICAL STENOSIS (UTERINE CERVIX): ICD-10-CM

## 2021-03-24 DIAGNOSIS — N95.0 PMB (POSTMENOPAUSAL BLEEDING): ICD-10-CM

## 2021-03-24 NOTE — PROGRESS NOTES
Merari Lucero is a 63year old female  No LMP recorded. (Menstrual status: Menopause). Patient presents with:  Procedure: endosee -- KCB pt. Had eval done for  bleed. u/s w/ 6.1 mm stripe  Test Results: has u/s done for  bleed  .     OBSTETRI Asked        Weight Concern: Not Asked        Special Diet: Not Asked        Back Care: Not Asked        Exercise: Not Asked        Bike Helmet: Not Asked        Seat Belt: Not Asked        Self-Exams: Not Asked    Social History Narrative      Not on file 1 capsule (40 mg total) by mouth 2 (two) times daily before meals. , Disp: 180 capsule, Rfl: 1  •  Montelukast Sodium 10 MG Oral Tab, TAKE ONE TABLET BY MOUTH AT BEDTIME, Disp: 90 tablet, Rfl: 1  •  Multiple Vitamins-Minerals (MULTI-VITAMIN/MINERALS) Oral T pain.  Neurological:    denies headaches, extremity weakness or numbness. Psychiatric:   denies depression or anxiety.         PHYSICAL EXAM:   BP (!) 162/88   Pulse 71   Wt 195 lb (88.5 kg)   BMI 34.54 kg/m²   Constitutional:  well developed, well nourish thickness    Endometrial polyp    Cervical polyp    Fibroid        Requested Prescriptions      No prescriptions requested or ordered in this encounter     Reviewed ultrasound findings in detail.  Reviewed past history in detail. [facility]

## 2021-03-24 NOTE — TELEPHONE ENCOUNTER
Please schedule the following surgery:    Procedure: hysteroscopic polypectomy, cervical polypectomy    Date: once medically cleared    Diagnosis: cervical polyp, endometrial polyp,  bleed, cervical stenosis    Admission:Day surgery    Anesth: general

## 2021-03-24 NOTE — PROCEDURES
Endosee Procedure     Consent signed. Procedure discussed with the patient in detail including indication, risks, benefits, alternatives and complications.     Indication:   bleed, increased endometrial stripe, cervical stenosis    Procedure:   diagno

## 2021-03-25 ENCOUNTER — TELEPHONE (OUTPATIENT)
Dept: INTERNAL MEDICINE CLINIC | Facility: CLINIC | Age: 56
End: 2021-03-25

## 2021-03-25 NOTE — TELEPHONE ENCOUNTER
Spoke to pt. Advised her needs medical clearance from PCP.  States doesn't understand why is needed if its a minor surgery did let her know she is diabetic and she will be under general anesthesia and Karoline Hopper just wants to insure she is healthy enough to do

## 2021-03-25 NOTE — TELEPHONE ENCOUNTER
Spoke with patient ( verified)--needs pre-op clearance from Dr. Alexandro Garcia for upcoming surgery with Dr. Esther Smith scheduled for surgery until cleared. See yesterday's encounter from Dr. Lee Memos office.     Please advise if/when patient needs to be seen in of

## 2021-03-30 NOTE — TELEPHONE ENCOUNTER
Pt advised that she needs appt for clearance. (had physical and lab work in January. Also had cardiac testing in Feb). Please advise if she can be added to your schedule ASAP as pt states that she would like to proceed with surgery as soon as possible.

## 2021-03-31 NOTE — TELEPHONE ENCOUNTER
Can anyone in this group scroll down the messages and notice that there is an appointment on 4 /6 at 4:30 PM.  Do not send this back to me to schedule.  Please do what is required to put the patient in the schedule

## 2021-04-08 ENCOUNTER — OFFICE VISIT (OUTPATIENT)
Dept: INTERNAL MEDICINE CLINIC | Facility: CLINIC | Age: 56
End: 2021-04-08

## 2021-04-08 VITALS
HEART RATE: 64 BPM | SYSTOLIC BLOOD PRESSURE: 140 MMHG | WEIGHT: 189 LBS | RESPIRATION RATE: 16 BRPM | DIASTOLIC BLOOD PRESSURE: 90 MMHG | BODY MASS INDEX: 32.27 KG/M2 | HEIGHT: 64 IN

## 2021-04-08 DIAGNOSIS — I10 ESSENTIAL HYPERTENSION: Primary | ICD-10-CM

## 2021-04-08 DIAGNOSIS — R00.2 PALPITATIONS: ICD-10-CM

## 2021-04-08 DIAGNOSIS — N95.0 PMB (POSTMENOPAUSAL BLEEDING): ICD-10-CM

## 2021-04-08 PROCEDURE — 3008F BODY MASS INDEX DOCD: CPT | Performed by: INTERNAL MEDICINE

## 2021-04-08 PROCEDURE — 3080F DIAST BP >= 90 MM HG: CPT | Performed by: INTERNAL MEDICINE

## 2021-04-08 PROCEDURE — 3077F SYST BP >= 140 MM HG: CPT | Performed by: INTERNAL MEDICINE

## 2021-04-08 PROCEDURE — 99214 OFFICE O/P EST MOD 30 MIN: CPT | Performed by: INTERNAL MEDICINE

## 2021-04-08 NOTE — PATIENT INSTRUCTIONS
Problem List Items Addressed This Visit        Unprioritized    Hypertension - Primary     Blood pressure 140/90, pulse 64, resp. rate 16, height 5' 4\" (1.626 m), weight 189 lb (85.7 kg), not currently breastfeeding. Blood pressure remains elevated.   Wallace Sainz She did not have any cardiac abnormalities of concern to interfere with general anesthesia or surgical procedure.   All her labs completed recently have been normal.  She is a diet-controlled diabetes and hence does not have any medications that would int

## 2021-04-08 NOTE — PROGRESS NOTES
HPI:    Patient ID: Saúl Beard is a 63year old female. tudy Conclusions   1. Left ventricle:  The cavity size was normal. Wall thickness was      normal. Systolic function was normal. The estimated ejection      fraction was 55-60%, by biplane meth since onset. The problem is controlled. Associated symptoms include malaise/fatigue and palpitations. Pertinent negatives include no peripheral edema or shortness of breath. There are no associated agents to hypertension.  Risk factors for coronary artery d Endocrine: Negative. Genitourinary: Negative. Musculoskeletal: Negative. Skin: Negative. Allergic/Immunologic: Negative. Neurological: Negative. Hematological: Negative. Psychiatric/Behavioral: Negative.              Current Outpatien AFFECTED AREA TWICE DAILY AS NEEDED (Patient not taking: Reported on 4/8/2021 ) 100 g 1   • Fluocinolone Acetonide Scalp 0.01 % External Oil External application as directed .  (Patient not taking: Reported on 4/8/2021 ) 118 mL 2   • Mometasone Furoate 0.1 Neck supple. Right lower leg: No edema. Left lower leg: No edema. Skin:     General: Skin is warm and dry. Coloration: Skin is not jaundiced. Findings: No erythema or rash. Neurological:      General: No focal deficit present. (postmenopausal bleeding)     History of menopause about 4 to 5 years back and history of fibroids. She was noted to have endometrial lining thickening about 2 years back and had a normal endometrial biopsy at the time.   She was seen in January for her ro

## 2021-04-08 NOTE — ASSESSMENT & PLAN NOTE
Blood pressure 140/90, pulse 64, resp. rate 16, height 5' 4\" (1.626 m), weight 189 lb (85.7 kg), not currently breastfeeding. Blood pressure remains elevated. Patient is here today without having taken both her medications.   She was due to take her losa

## 2021-04-08 NOTE — ASSESSMENT & PLAN NOTE
History of intermittent but chronic palpitations. She has been seen by Dr. Sajan Norton in the past.  She has completed her Holter monitor, 2D echo Doppler of the heart as well as a Lexiscan stress test all of which were within normal limits.   Doses of Coreg were

## 2021-04-08 NOTE — ASSESSMENT & PLAN NOTE
History of menopause about 4 to 5 years back and history of fibroids. She was noted to have endometrial lining thickening about 2 years back and had a normal endometrial biopsy at the time.   She was seen in January for her routine physical when she compla

## 2021-04-09 ENCOUNTER — TELEPHONE (OUTPATIENT)
Dept: OBGYN CLINIC | Facility: CLINIC | Age: 56
End: 2021-04-09

## 2021-04-09 ENCOUNTER — NURSE ONLY (OUTPATIENT)
Dept: INTERNAL MEDICINE CLINIC | Facility: CLINIC | Age: 56
End: 2021-04-09

## 2021-04-09 VITALS — SYSTOLIC BLOOD PRESSURE: 130 MMHG | DIASTOLIC BLOOD PRESSURE: 76 MMHG | HEART RATE: 68 BPM

## 2021-04-09 DIAGNOSIS — I10 ESSENTIAL HYPERTENSION: Primary | ICD-10-CM

## 2021-04-09 PROCEDURE — 3075F SYST BP GE 130 - 139MM HG: CPT | Performed by: INTERNAL MEDICINE

## 2021-04-09 PROCEDURE — 3078F DIAST BP <80 MM HG: CPT | Performed by: INTERNAL MEDICINE

## 2021-04-09 NOTE — TELEPHONE ENCOUNTER
Informed pt surgery coordinator out of the office until 4/12/2021. Pt asking to schedule surgery today for next week. Informed pt message will be sent to surgery coordinator and unsure if the OR and [facility] will have availability next week. Pt verbalized understanding.      Message to surgery coordinator

## 2021-04-09 NOTE — TELEPHONE ENCOUNTER
Patient got clearance from her PCP for surgery with LISETH. She is ready to schedule she says.  Thank you

## 2021-04-09 NOTE — PROGRESS NOTES
Patient came in for nurse visit b/p check. I verified orders and name/. I checked pt b/p manually on left arm. B/P was 130/76 with a pulse of 68. Advised pcp of reading.

## 2021-04-12 NOTE — TELEPHONE ENCOUNTER
Spoke pt would like to know if OK to schedule surgical case a week from receiving 2nd dose of Covid Vaccine

## 2021-04-13 NOTE — TELEPHONE ENCOUNTER
Verbal communication with [facility] advised in clearance PCP suggested to do surgery 2weeks after vaccine.  Verbalized to follow PCP instructions       Spoke to pt advised next possible date to schedule would be May 5th states will have to push further than that

## 2021-04-15 NOTE — TELEPHONE ENCOUNTER
Pt is asking for update on scheduling procedure. Hoping for 2nd week of June (6/15 am)  2nd choice 6/9 afternoon, 3rd 6/14 afternoon. AM preferred due to fasting.

## 2021-04-16 RX ORDER — MISOPROSTOL 200 UG/1
400 TABLET ORAL ONCE
Qty: 2 TABLET | Refills: 0 | Status: SHIPPED | OUTPATIENT
Start: 2021-04-16 | End: 2021-04-16

## 2021-04-16 NOTE — TELEPHONE ENCOUNTER
Spoke to to pt aware surgery is scheduled on Wed,06/09/2021 at 1pm. [employer] Instructions provided.  Verbalized understandings     Surgical order initiated PA, and gets sent to [address] [place] [address] for approval     Email Sent to [facility] for Rep Assist    Minor case in

## 2021-04-19 NOTE — TELEPHONE ENCOUNTER
Nick Ribeiro confirmed    Surgical change request sent to add as rep assist    Updated book and calender

## 2021-04-21 ENCOUNTER — TELEPHONE (OUTPATIENT)
Dept: OBGYN CLINIC | Facility: CLINIC | Age: 56
End: 2021-04-21

## 2021-04-22 NOTE — TELEPHONE ENCOUNTER
Spoke to pt would like to r/s surgical case from Wed,06/09/21 at 1pm to Tuesday,05/11/2021 at 1pm    surgery change request sent    Email sent to [facility] regarding update, and advise if he is still available to assist    Updated instructions sent to [facility]

## 2021-05-03 ENCOUNTER — PATIENT MESSAGE (OUTPATIENT)
Dept: INTERNAL MEDICINE CLINIC | Facility: CLINIC | Age: 56
End: 2021-05-03

## 2021-05-03 NOTE — TELEPHONE ENCOUNTER
From: Manoj Rodriguez  To:  Tato Zimmerman MD  Sent: 5/3/2021 3:18 PM CDT  Subject: Other    Covid 2 step Pfizer completion   Card

## 2021-05-08 ENCOUNTER — LAB ENCOUNTER (OUTPATIENT)
Dept: LAB | Facility: HOSPITAL | Age: 56
End: 2021-05-08
Attending: OBSTETRICS & GYNECOLOGY
Payer: COMMERCIAL

## 2021-05-08 DIAGNOSIS — Z01.818 PREOPERATIVE TESTING: ICD-10-CM

## 2021-05-11 ENCOUNTER — ANESTHESIA EVENT (OUTPATIENT)
Dept: SURGERY | Facility: HOSPITAL | Age: 56
End: 2021-05-11
Payer: COMMERCIAL

## 2021-05-11 ENCOUNTER — ANESTHESIA (OUTPATIENT)
Dept: SURGERY | Facility: HOSPITAL | Age: 56
End: 2021-05-11
Payer: COMMERCIAL

## 2021-05-11 ENCOUNTER — HOSPITAL ENCOUNTER (OUTPATIENT)
Facility: HOSPITAL | Age: 56
Setting detail: HOSPITAL OUTPATIENT SURGERY
Discharge: HOME | End: 2021-05-11
Attending: OBSTETRICS & GYNECOLOGY | Admitting: OBSTETRICS & GYNECOLOGY
Payer: COMMERCIAL

## 2021-05-11 VITALS
RESPIRATION RATE: 16 BRPM | WEIGHT: 188.13 LBS | SYSTOLIC BLOOD PRESSURE: 129 MMHG | OXYGEN SATURATION: 98 % | DIASTOLIC BLOOD PRESSURE: 71 MMHG | BODY MASS INDEX: 32.12 KG/M2 | TEMPERATURE: 98 F | HEART RATE: 52 BPM | HEIGHT: 64 IN

## 2021-05-11 DIAGNOSIS — N88.2 CERVICAL STENOSIS (UTERINE CERVIX): ICD-10-CM

## 2021-05-11 DIAGNOSIS — Z01.818 PREOPERATIVE TESTING: Primary | ICD-10-CM

## 2021-05-11 DIAGNOSIS — N95.0 PMB (POSTMENOPAUSAL BLEEDING): ICD-10-CM

## 2021-05-11 DIAGNOSIS — N84.0 ENDOMETRIAL POLYP: ICD-10-CM

## 2021-05-11 DIAGNOSIS — N84.1 CERVICAL POLYP: ICD-10-CM

## 2021-05-11 PROCEDURE — 88305 TISSUE EXAM BY PATHOLOGIST: CPT | Performed by: OBSTETRICS & GYNECOLOGY

## 2021-05-11 PROCEDURE — 0UDB8ZZ EXTRACTION OF ENDOMETRIUM, VIA NATURAL OR ARTIFICIAL OPENING ENDOSCOPIC: ICD-10-PCS | Performed by: OBSTETRICS & GYNECOLOGY

## 2021-05-11 PROCEDURE — 0UBC8ZZ EXCISION OF CERVIX, VIA NATURAL OR ARTIFICIAL OPENING ENDOSCOPIC: ICD-10-PCS | Performed by: OBSTETRICS & GYNECOLOGY

## 2021-05-11 PROCEDURE — 82962 GLUCOSE BLOOD TEST: CPT

## 2021-05-11 RX ORDER — MORPHINE SULFATE 4 MG/ML
4 INJECTION, SOLUTION INTRAMUSCULAR; INTRAVENOUS EVERY 10 MIN PRN
Status: DISCONTINUED | OUTPATIENT
Start: 2021-05-11 | End: 2021-05-11

## 2021-05-11 RX ORDER — NALOXONE HYDROCHLORIDE 0.4 MG/ML
80 INJECTION, SOLUTION INTRAMUSCULAR; INTRAVENOUS; SUBCUTANEOUS AS NEEDED
Status: DISCONTINUED | OUTPATIENT
Start: 2021-05-11 | End: 2021-05-11

## 2021-05-11 RX ORDER — SODIUM CHLORIDE, SODIUM LACTATE, POTASSIUM CHLORIDE, CALCIUM CHLORIDE 600; 310; 30; 20 MG/100ML; MG/100ML; MG/100ML; MG/100ML
INJECTION, SOLUTION INTRAVENOUS CONTINUOUS
Status: DISCONTINUED | OUTPATIENT
Start: 2021-05-11 | End: 2021-05-11

## 2021-05-11 RX ORDER — GLYCOPYRROLATE 0.2 MG/ML
INJECTION, SOLUTION INTRAMUSCULAR; INTRAVENOUS AS NEEDED
Status: DISCONTINUED | OUTPATIENT
Start: 2021-05-11 | End: 2021-05-11 | Stop reason: SURG

## 2021-05-11 RX ORDER — LIDOCAINE HYDROCHLORIDE 10 MG/ML
INJECTION, SOLUTION EPIDURAL; INFILTRATION; INTRACAUDAL; PERINEURAL AS NEEDED
Status: DISCONTINUED | OUTPATIENT
Start: 2021-05-11 | End: 2021-05-11 | Stop reason: SURG

## 2021-05-11 RX ORDER — MORPHINE SULFATE 10 MG/ML
6 INJECTION, SOLUTION INTRAMUSCULAR; INTRAVENOUS EVERY 10 MIN PRN
Status: DISCONTINUED | OUTPATIENT
Start: 2021-05-11 | End: 2021-05-11

## 2021-05-11 RX ORDER — ONDANSETRON 2 MG/ML
INJECTION INTRAMUSCULAR; INTRAVENOUS AS NEEDED
Status: DISCONTINUED | OUTPATIENT
Start: 2021-05-11 | End: 2021-05-11 | Stop reason: SURG

## 2021-05-11 RX ORDER — HYDROMORPHONE HYDROCHLORIDE 1 MG/ML
0.2 INJECTION, SOLUTION INTRAMUSCULAR; INTRAVENOUS; SUBCUTANEOUS EVERY 5 MIN PRN
Status: DISCONTINUED | OUTPATIENT
Start: 2021-05-11 | End: 2021-05-11

## 2021-05-11 RX ORDER — ACETAMINOPHEN 500 MG
1000 TABLET ORAL ONCE
Status: COMPLETED | OUTPATIENT
Start: 2021-05-11 | End: 2021-05-11

## 2021-05-11 RX ORDER — HYDROMORPHONE HYDROCHLORIDE 1 MG/ML
0.6 INJECTION, SOLUTION INTRAMUSCULAR; INTRAVENOUS; SUBCUTANEOUS EVERY 5 MIN PRN
Status: DISCONTINUED | OUTPATIENT
Start: 2021-05-11 | End: 2021-05-11

## 2021-05-11 RX ORDER — MORPHINE SULFATE 4 MG/ML
2 INJECTION, SOLUTION INTRAMUSCULAR; INTRAVENOUS EVERY 10 MIN PRN
Status: DISCONTINUED | OUTPATIENT
Start: 2021-05-11 | End: 2021-05-11

## 2021-05-11 RX ORDER — DEXAMETHASONE SODIUM PHOSPHATE 4 MG/ML
VIAL (ML) INJECTION AS NEEDED
Status: DISCONTINUED | OUTPATIENT
Start: 2021-05-11 | End: 2021-05-11 | Stop reason: SURG

## 2021-05-11 RX ORDER — HYDROMORPHONE HYDROCHLORIDE 1 MG/ML
0.4 INJECTION, SOLUTION INTRAMUSCULAR; INTRAVENOUS; SUBCUTANEOUS EVERY 5 MIN PRN
Status: DISCONTINUED | OUTPATIENT
Start: 2021-05-11 | End: 2021-05-11

## 2021-05-11 RX ORDER — HYDROCODONE BITARTRATE AND ACETAMINOPHEN 5; 325 MG/1; MG/1
1 TABLET ORAL AS NEEDED
Status: DISCONTINUED | OUTPATIENT
Start: 2021-05-11 | End: 2021-05-11

## 2021-05-11 RX ORDER — HYDROCODONE BITARTRATE AND ACETAMINOPHEN 5; 325 MG/1; MG/1
2 TABLET ORAL AS NEEDED
Status: DISCONTINUED | OUTPATIENT
Start: 2021-05-11 | End: 2021-05-11

## 2021-05-11 RX ORDER — KETOROLAC TROMETHAMINE 30 MG/ML
INJECTION, SOLUTION INTRAMUSCULAR; INTRAVENOUS AS NEEDED
Status: DISCONTINUED | OUTPATIENT
Start: 2021-05-11 | End: 2021-05-11 | Stop reason: SURG

## 2021-05-11 RX ORDER — METOPROLOL TARTRATE 5 MG/5ML
2.5 INJECTION INTRAVENOUS ONCE
Status: DISCONTINUED | OUTPATIENT
Start: 2021-05-11 | End: 2021-05-11

## 2021-05-11 RX ORDER — ONDANSETRON 2 MG/ML
4 INJECTION INTRAMUSCULAR; INTRAVENOUS ONCE AS NEEDED
Status: DISCONTINUED | OUTPATIENT
Start: 2021-05-11 | End: 2021-05-11

## 2021-05-11 RX ORDER — PROCHLORPERAZINE EDISYLATE 5 MG/ML
5 INJECTION INTRAMUSCULAR; INTRAVENOUS ONCE AS NEEDED
Status: DISCONTINUED | OUTPATIENT
Start: 2021-05-11 | End: 2021-05-11

## 2021-05-11 RX ORDER — HALOPERIDOL 5 MG/ML
0.25 INJECTION INTRAMUSCULAR ONCE AS NEEDED
Status: DISCONTINUED | OUTPATIENT
Start: 2021-05-11 | End: 2021-05-11

## 2021-05-11 RX ORDER — METOCLOPRAMIDE 10 MG/1
10 TABLET ORAL ONCE
Status: COMPLETED | OUTPATIENT
Start: 2021-05-11 | End: 2021-05-11

## 2021-05-11 RX ORDER — DEXTROSE MONOHYDRATE 25 G/50ML
50 INJECTION, SOLUTION INTRAVENOUS
Status: DISCONTINUED | OUTPATIENT
Start: 2021-05-11 | End: 2021-05-11

## 2021-05-11 RX ORDER — MIDAZOLAM HYDROCHLORIDE 1 MG/ML
INJECTION INTRAMUSCULAR; INTRAVENOUS AS NEEDED
Status: DISCONTINUED | OUTPATIENT
Start: 2021-05-11 | End: 2021-05-11 | Stop reason: SURG

## 2021-05-11 RX ORDER — FAMOTIDINE 20 MG/1
20 TABLET ORAL ONCE
Status: COMPLETED | OUTPATIENT
Start: 2021-05-11 | End: 2021-05-11

## 2021-05-11 RX ADMIN — ONDANSETRON 4 MG: 2 INJECTION INTRAMUSCULAR; INTRAVENOUS at 13:01:00

## 2021-05-11 RX ADMIN — LIDOCAINE HYDROCHLORIDE 25 MG: 10 INJECTION, SOLUTION EPIDURAL; INFILTRATION; INTRACAUDAL; PERINEURAL at 12:58:00

## 2021-05-11 RX ADMIN — DEXAMETHASONE SODIUM PHOSPHATE 4 MG: 4 MG/ML VIAL (ML) INJECTION at 13:01:00

## 2021-05-11 RX ADMIN — KETOROLAC TROMETHAMINE 30 MG: 30 INJECTION, SOLUTION INTRAMUSCULAR; INTRAVENOUS at 13:20:00

## 2021-05-11 RX ADMIN — MIDAZOLAM HYDROCHLORIDE 2 MG: 1 INJECTION INTRAMUSCULAR; INTRAVENOUS at 12:56:00

## 2021-05-11 RX ADMIN — SODIUM CHLORIDE, SODIUM LACTATE, POTASSIUM CHLORIDE, CALCIUM CHLORIDE: 600; 310; 30; 20 INJECTION, SOLUTION INTRAVENOUS at 13:35:00

## 2021-05-11 RX ADMIN — GLYCOPYRROLATE 0.2 MG: 0.2 INJECTION, SOLUTION INTRAMUSCULAR; INTRAVENOUS at 13:01:00

## 2021-05-11 NOTE — H&P
[address] [place] [address] Patient Status:  Hospital Outpatient Surgery    8/15/1965 MRN [id number]   Location [facility] Attending Killian Broderick MD   Hosp Day # 0 LORIE Ramirez 1  Diclofenac Sodium 1 % Transdermal Gel, APPLY TO THE AFFECTED AREA TWICE DAILY AS NEEDED, Disp: 100 g, Rfl: 1  Fluocinolone Acetonide Scalp 0.01 % External Oil, External application as directed ., Disp: 118 mL, Rfl: 2  Mometasone Furoate 0.1 % External C Bilateral     fine needle breast biopsy - benign   • STRESS TEST SCAN     • UPPER GI ENDOSCOPY,EXAM         Past OB History:  OB History    Para Term  AB Living   5 3     1 4   SAB TAB Ectopic Multiple Live Births   1       4      # Outcome D 33.0 (H) 12/21/2020     (H) 12/21/2020    CA 10.0 12/21/2020    ALB 3.8 12/21/2020    ALB 4.38 12/21/2020    ALKPHO 85 12/21/2020    BILT 0.4 12/21/2020    TP 8.8 (H) 12/21/2020    TP 8.6 (H) 12/21/2020    AST 13 (L) 12/21/2020    ALT 19 12/21/2020 patient. She notes understanding and agrees with the plan of care. All questions were answered to the best of my ability at this time.     Miller Tapia  5/11/2021  12:05 PM

## 2021-05-11 NOTE — ANESTHESIA PREPROCEDURE EVALUATION
Anesthesia PreOp Note    HPI:     Trell Call is a 63year old female who presents for preoperative consultation requested by: Lauren Castillo MD    Date of Surgery: 5/11/2021    Procedure(s):  Hysteroscopic Polypectomy, Cervical Polypectomy  Dee Dee 09/15/2014      Chronic pain of left knee         Date Noted: 09/15/2014        Past Medical History:   Diagnosis Date   • Acid reflux 2005   • Anemia    • Arrhythmia     palpitations    • Chronic rhinitis    • Esophageal reflux    • High blood pressure puff by Nasal route 2 (two) times daily. , Disp: 3 Bottle, Rfl: 1, 5/9/2021  Diclofenac Sodium 1 % Transdermal Gel, APPLY TO THE AFFECTED AREA TWICE DAILY AS NEEDED, Disp: 100 g, Rfl: 1, 5/9/2021  Estradiol (ESTRACE) 0.1 MG/GM Vaginal Cream, Place 1 g vagin BREATH    Family History   Problem Relation Age of Onset   • Hypertension Father    • Lipids Father    • Thyroid disease Father    • Hypertension Mother    • Lipids Mother    • Thyroid disease Mother    • Diabetes Maternal Grandmother    • Diabetes Materna of Communication with Friends and Family:       Frequency of Social Gatherings with Friends and Family:       Attends Buddhist Services:       Active Member of Clubs or Organizations:       Attends Club or Organization Meetings:       Marital Status:    In

## 2021-05-11 NOTE — ANESTHESIA PROCEDURE NOTES
Airway  Date/Time: 5/11/2021 1:00 PM  Urgency: Elective    Airway not difficult    General Information and Staff    Patient location during procedure: OR  Anesthesiologist: Marques Leigh MD  Resident/CRNA: Isaac Robison CRNA  Performed: Annia Chapa

## 2021-05-11 NOTE — OPERATIVE REPORT
[address] [facility] Detailed Operative Note    Mayra Mally Patient Status:  Hospital Outpatient Surgery    8/15/1965 MRN [id number]   Location [address] [facility] Attending Rachell Cunningham MD   [facility] Day #

## 2021-05-11 NOTE — ANESTHESIA POSTPROCEDURE EVALUATION
Patient: Malaika Gaston    Procedure Summary     Date: 05/11/21 Room / Location: [facility] OR 03 / [facility] OR    Anesthesia Start: 0576 Anesthesia Stop: 2458    Procedure: Hysteroscopic Polypectomy, Cervical Polypectomy (N/A ) Diagnosis:       Cervic

## 2021-05-18 ENCOUNTER — TELEPHONE (OUTPATIENT)
Dept: OBGYN CLINIC | Facility: CLINIC | Age: 56
End: 2021-05-18

## 2021-05-18 NOTE — TELEPHONE ENCOUNTER
Pt had Hysteroscopic Polypectomy, Cervical Polypectomy with [facility] on 5/11 and needs 2 week post op appt around 5/25. Pt informed that only availability [facility] has that week is in [place] on Thursday morning 5/27. Pt stated she lives very close to the [facility] and would prefer to come here. Pt asking if there is any way [facility] can add pt next week in [place]. Or if OK to wait until next available appt at [place] [address]?

## 2021-05-18 NOTE — TELEPHONE ENCOUNTER
Patient states she has to come in on Tuesday for her post op follow up visit.  Next available appointment was on 05/28, but patient declined stating she wants it on 05/25

## 2021-05-19 ENCOUNTER — TELEPHONE (OUTPATIENT)
Dept: OBGYN CLINIC | Facility: CLINIC | Age: 56
End: 2021-05-19

## 2021-05-19 NOTE — TELEPHONE ENCOUNTER
Patient calling back to schedule Post Op appt. Please call to confirm the time you would like her to come in.

## 2021-05-25 ENCOUNTER — OFFICE VISIT (OUTPATIENT)
Dept: OBGYN CLINIC | Facility: CLINIC | Age: 56
End: 2021-05-25

## 2021-05-25 ENCOUNTER — NURSE TRIAGE (OUTPATIENT)
Dept: INTERNAL MEDICINE CLINIC | Facility: CLINIC | Age: 56
End: 2021-05-25

## 2021-05-25 ENCOUNTER — LAB ENCOUNTER (OUTPATIENT)
Dept: LAB | Facility: HOSPITAL | Age: 56
End: 2021-05-25
Attending: INTERNAL MEDICINE
Payer: COMMERCIAL

## 2021-05-25 ENCOUNTER — HOSPITAL ENCOUNTER (OUTPATIENT)
Dept: GENERAL RADIOLOGY | Facility: HOSPITAL | Age: 56
Discharge: HOME | End: 2021-05-25
Attending: INTERNAL MEDICINE
Payer: COMMERCIAL

## 2021-05-25 ENCOUNTER — OFFICE VISIT (OUTPATIENT)
Dept: INTERNAL MEDICINE CLINIC | Facility: CLINIC | Age: 56
End: 2021-05-25

## 2021-05-25 VITALS — SYSTOLIC BLOOD PRESSURE: 146 MMHG | HEART RATE: 68 BPM | DIASTOLIC BLOOD PRESSURE: 81 MMHG

## 2021-05-25 VITALS
HEIGHT: 64 IN | SYSTOLIC BLOOD PRESSURE: 139 MMHG | HEART RATE: 66 BPM | BODY MASS INDEX: 32.44 KG/M2 | WEIGHT: 190 LBS | DIASTOLIC BLOOD PRESSURE: 84 MMHG

## 2021-05-25 DIAGNOSIS — M79.644 PAIN OF FINGER OF RIGHT HAND: Primary | ICD-10-CM

## 2021-05-25 DIAGNOSIS — N84.0 ENDOMETRIAL POLYP: Primary | ICD-10-CM

## 2021-05-25 DIAGNOSIS — M79.644 PAIN OF FINGER OF RIGHT HAND: ICD-10-CM

## 2021-05-25 DIAGNOSIS — E11.9 TYPE 2 DIABETES MELLITUS WITHOUT COMPLICATION, WITHOUT LONG-TERM CURRENT USE OF INSULIN (HCC): ICD-10-CM

## 2021-05-25 PROBLEM — Z01.419 PAP TEST, AS PART OF ROUTINE GYNECOLOGICAL EXAMINATION: Status: RESOLVED | Noted: 2017-10-19 | Resolved: 2021-05-25

## 2021-05-25 PROCEDURE — 84550 ASSAY OF BLOOD/URIC ACID: CPT

## 2021-05-25 PROCEDURE — 3008F BODY MASS INDEX DOCD: CPT | Performed by: INTERNAL MEDICINE

## 2021-05-25 PROCEDURE — 3044F HG A1C LEVEL LT 7.0%: CPT | Performed by: INTERNAL MEDICINE

## 2021-05-25 PROCEDURE — 36415 COLL VENOUS BLD VENIPUNCTURE: CPT

## 2021-05-25 PROCEDURE — 73140 X-RAY EXAM OF FINGER(S): CPT | Performed by: INTERNAL MEDICINE

## 2021-05-25 PROCEDURE — 83036 HEMOGLOBIN GLYCOSYLATED A1C: CPT

## 2021-05-25 PROCEDURE — 3079F DIAST BP 80-89 MM HG: CPT | Performed by: OBSTETRICS & GYNECOLOGY

## 2021-05-25 PROCEDURE — 85025 COMPLETE CBC W/AUTO DIFF WBC: CPT

## 2021-05-25 PROCEDURE — 80053 COMPREHEN METABOLIC PANEL: CPT

## 2021-05-25 PROCEDURE — 3077F SYST BP >= 140 MM HG: CPT | Performed by: OBSTETRICS & GYNECOLOGY

## 2021-05-25 PROCEDURE — 3079F DIAST BP 80-89 MM HG: CPT | Performed by: INTERNAL MEDICINE

## 2021-05-25 PROCEDURE — 99212 OFFICE O/P EST SF 10 MIN: CPT | Performed by: OBSTETRICS & GYNECOLOGY

## 2021-05-25 PROCEDURE — 99213 OFFICE O/P EST LOW 20 MIN: CPT | Performed by: INTERNAL MEDICINE

## 2021-05-25 PROCEDURE — 3075F SYST BP GE 130 - 139MM HG: CPT | Performed by: INTERNAL MEDICINE

## 2021-05-25 PROCEDURE — 80061 LIPID PANEL: CPT

## 2021-05-25 RX ORDER — METHYLPREDNISOLONE 4 MG/1
TABLET ORAL
Qty: 1 EACH | Refills: 0 | Status: SHIPPED | OUTPATIENT
Start: 2021-05-25 | End: 2021-11-04

## 2021-05-25 NOTE — TELEPHONE ENCOUNTER
Action Requested: Summary for Provider     []  Critical Lab, Recommendations Needed  [] Need Additional Advice  []   FYI    []   Need Orders  [] Need Medications Sent to Pharmacy  []  Other     SUMMARY: Patient c/o pain to right middle finger, inflamed, an

## 2021-05-25 NOTE — PATIENT INSTRUCTIONS
Gout Diet  Gout is a painful condition caused by an excess of uric acid, a waste product made by the body. Uric acid forms crystals that collect in the joints. The immune response to these crystals brings on symptoms of joint pain and swelling.  This is c Complex carbohydrate foods, including whole grains, brown rice, oats, and beans  · Coffee, in moderation  · Water, approximately 64 ounces per day  Follow-up care  Follow up with your healthcare provider as advised.   When to seek medical advice  Call your

## 2021-05-25 NOTE — PROGRESS NOTES
Marilyn Koo is a 63year old female  No LMP recorded (lmp unknown). Patient is postmenopausal. Patient presents with: Follow - Up: had hysteroscopic polypectomy (Matilde Kumar pt)  .     OBSTETRICS HISTORY:  OB History     T0    L4    SAB Occupational Exposure: Not Asked        Hobby Hazards: Not Asked        Sleep Concern: Not Asked        Stress Concern: Not Asked        Weight Concern: Not Asked        Special Diet: Not Asked        Back Care: Not Asked        Exercise: Not Asked tablet, Rfl: 1  •  hydrochlorothiazide 25 MG Oral Tab, Take 1 tablet (25 mg total) by mouth daily. , Disp: 90 tablet, Rfl: 1  •  Levocetirizine Dihydrochloride 5 MG Oral Tab, Take 1 tablet (5 mg total) by mouth nightly., Disp: 90 tablet, Rfl: 1  •  losartan BREATH      Review of Systems:  Constitutional:    denies fatigue, night sweats, hot flashes  Gastrointestinal:  denies abdominal pain, diarrhea or constipation  Genitourinary:    denies dysuria, abnormal vaginal discharge, vaginal itching  Musculoskeletal Description       The specimen is labeled \"Robbymed, endometrial polyp. \" Received in formalin is a surgical suction trap bag is a 2.0 x 1.0 x 0.3 cm aggregate of tan-pink rubber tissue fragments.  The specimen is filtered through a mesh bag and entirely subm

## 2021-05-25 NOTE — PROGRESS NOTES
Patient ID: Alma Kohli is a 63year old female.   Patient presents with:  Hand Pain: Pt reports to office due to \"having hard time moving right hand, feels inflammed\" x1 week \"for the last two day having a lot of pain, intense\"        HISTORY methylPREDNISolone (MEDROL) 4 MG Oral Tablet Therapy Pack, As directed., Disp: 1 each, Rfl: 0  •  MECLIZINE HCL 25 MG Oral Tab, TAKE 1 TABLET BY MOUTH THREE TIMES DAILY AS NEEDED FOR DIZZINESS, Disp: 20 tablet, Rfl: 0  •  misoprostol (CYTOTEC) 200 MCG Oral Rfl: 5  •  Multiple Vitamins-Minerals (MULTI-VITAMIN/MINERALS) Oral Tab, Take 1 tablet by mouth daily. (Patient not taking: Reported on 4/8/2021 ), Disp: , Rfl:   •  Cyanocobalamin (VITAMIN B-12) 1000 MCG Sublingual SL Tab, Place  under the tongue.  take 1 Lack of Transportation (Medical):       Lack of Transportation (Non-Medical):   Physical Activity:       Days of Exercise per Week:       Minutes of Exercise per Session:   Stress:       Feeling of Stress :   Social Connections:       Frequency of Communic

## 2021-05-25 NOTE — TELEPHONE ENCOUNTER
----- Message from Maliha Noel. Jennifer sent at 5/25/2021 12:18 AM CDT -----  Regarding: Referral Request  Contact: [phone]  Jolynn Joshi,  I am having extreme pain in my right hand. it is at the base of the fingers.  Most   Of the Pain is at the base o

## 2021-05-26 RX ORDER — OMEPRAZOLE 40 MG/1
CAPSULE, DELAYED RELEASE ORAL
Qty: 180 CAPSULE | Refills: 0 | Status: SHIPPED | OUTPATIENT
Start: 2021-05-26 | End: 2021-08-22

## 2021-05-27 ENCOUNTER — OFFICE VISIT (OUTPATIENT)
Dept: SURGERY | Facility: CLINIC | Age: 56
End: 2021-05-27

## 2021-05-27 DIAGNOSIS — M65.841 OTHER SYNOVITIS AND TENOSYNOVITIS, RIGHT HAND: Primary | ICD-10-CM

## 2021-05-27 PROCEDURE — 99243 OFF/OP CNSLTJ NEW/EST LOW 30: CPT | Performed by: PLASTIC SURGERY

## 2021-05-27 RX ORDER — LOSARTAN POTASSIUM AND HYDROCHLOROTHIAZIDE 25; 100 MG/1; MG/1
TABLET ORAL
COMMUNITY
Start: 2021-05-25 | End: 2021-10-27

## 2021-05-27 NOTE — H&P
Yana Orozco is a 63year old female that presents with Patient presents with:  Pain: Right hand      REFERRED BY:  Nevin Funes      Pacemaker: No  Latex Allergy: no  Coumadin: No  Plavix: No  Other anticoagulants: No  Cardiac stents: No    HAND DOM LOCALIZATION WIRE 1 SITE RIGHT (CPT=19281)     • NEEDLE BIOPSY LEFT     •      • OTHER SURGICAL HISTORY Bilateral     fine needle breast biopsy - benign   • STRESS TEST SCAN     • UPPER GI ENDOSCOPY,EXAM          ALLERIGIES    Sulfa Antibiotics       R DAILY AS NEEDED 100 g 1   • Fluocinolone Acetonide Scalp 0.01 % External Oil External application as directed . 118 mL 2   • Mometasone Furoate 0.1 % External Cream External application 2 times daily as directed.  45 g 0   • Cyanocobalamin (VITAMIN B-12) 10 options. Synovitis is often difficult to cure. It may require multiple treatments over a long period of time, and symptoms may not go away completely. Even with satisfactory treatment, synovitis may recur.   Questions were answered and the patient wishes

## 2021-07-08 ENCOUNTER — TELEPHONE (OUTPATIENT)
Dept: CASE MANAGEMENT | Age: 56
End: 2021-07-08

## 2021-07-08 DIAGNOSIS — M79.644 PAIN IN FINGER OF RIGHT HAND: Primary | ICD-10-CM

## 2021-07-08 DIAGNOSIS — M25.532 PAIN IN BOTH WRISTS: ICD-10-CM

## 2021-07-08 DIAGNOSIS — M25.531 PAIN IN BOTH WRISTS: ICD-10-CM

## 2021-07-08 DIAGNOSIS — M19.90 ARTHRITIS: ICD-10-CM

## 2021-07-08 NOTE — TELEPHONE ENCOUNTER
Pt is requesting a referral to see Dr. SARITA PENA. Pt notes that she is in pain all of the time. 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 Pt notes that Dr. Mauro Hartmann recommended for hands. Pt is scheduled for 7.10.21.  Please sign off so pt can m

## 2021-07-10 ENCOUNTER — TELEPHONE (OUTPATIENT)
Dept: OBGYN CLINIC | Facility: CLINIC | Age: 56
End: 2021-07-10

## 2021-07-10 ENCOUNTER — HOSPITAL ENCOUNTER (OUTPATIENT)
Dept: GENERAL RADIOLOGY | Facility: HOSPITAL | Age: 56
Discharge: HOME | End: 2021-07-10
Attending: INTERNAL MEDICINE
Payer: COMMERCIAL

## 2021-07-10 ENCOUNTER — OFFICE VISIT (OUTPATIENT)
Dept: RHEUMATOLOGY | Facility: CLINIC | Age: 56
End: 2021-07-10

## 2021-07-10 VITALS
DIASTOLIC BLOOD PRESSURE: 83 MMHG | HEIGHT: 64 IN | WEIGHT: 190 LBS | BODY MASS INDEX: 32.44 KG/M2 | HEART RATE: 65 BPM | SYSTOLIC BLOOD PRESSURE: 138 MMHG

## 2021-07-10 DIAGNOSIS — M79.641 BILATERAL HAND PAIN: Primary | ICD-10-CM

## 2021-07-10 DIAGNOSIS — M54.2 NECK PAIN: ICD-10-CM

## 2021-07-10 DIAGNOSIS — M79.642 BILATERAL HAND PAIN: ICD-10-CM

## 2021-07-10 DIAGNOSIS — M79.641 BILATERAL HAND PAIN: ICD-10-CM

## 2021-07-10 DIAGNOSIS — M79.642 BILATERAL HAND PAIN: Primary | ICD-10-CM

## 2021-07-10 PROCEDURE — 72050 X-RAY EXAM NECK SPINE 4/5VWS: CPT | Performed by: INTERNAL MEDICINE

## 2021-07-10 PROCEDURE — 3075F SYST BP GE 130 - 139MM HG: CPT | Performed by: INTERNAL MEDICINE

## 2021-07-10 PROCEDURE — 99244 OFF/OP CNSLTJ NEW/EST MOD 40: CPT | Performed by: INTERNAL MEDICINE

## 2021-07-10 PROCEDURE — 3008F BODY MASS INDEX DOCD: CPT | Performed by: INTERNAL MEDICINE

## 2021-07-10 PROCEDURE — 3079F DIAST BP 80-89 MM HG: CPT | Performed by: INTERNAL MEDICINE

## 2021-07-10 PROCEDURE — A4570 SPLINT: HCPCS | Performed by: INTERNAL MEDICINE

## 2021-07-10 RX ORDER — MELOXICAM 15 MG/1
TABLET ORAL
Qty: 30 TABLET | Refills: 0 | Status: SHIPPED | OUTPATIENT
Start: 2021-07-10

## 2021-07-10 NOTE — TELEPHONE ENCOUNTER
Pt requesting mammo order. Last annual 2/2021 with [facility]  Last mammo 5/25/20 ordered by Dr Lucius Rodriguez- normal    Saw [facility] for embx in March. Routed to [facility]. OK to order mammo or should pt contact Dr Lucius Rodriguez? Thanks.

## 2021-07-10 NOTE — PROGRESS NOTES
Solange Vidales is a 63year old female who presents for No chief complaint on file. Andra Cancino HPI:   CC: R hand pain  Consult: referred by PCP Dr. Jackson Robles     This is a 64 yo F with hx of HTN, GERD, Palpitations, Vertigo presents with joint pain.   She has tablet 0   • Estradiol (ESTRACE) 0.1 MG/GM Vaginal Cream Place 1 g vaginally 3 (three) times a week. 1 Tube 5   • carvedilol (COREG) 6.25 MG Oral Tab Take 1.5 tablets (9.375 mg total) by mouth 2 (two) times daily with meals.  135 tablet 1   • hydrochlorothi LOCALIZATION WIRE 1 SITE RIGHT (CPT=19281)     • NEEDLE BIOPSY LEFT     •      • OTHER SURGICAL HISTORY Bilateral     fine needle breast biopsy - benign   • STRESS TEST SCAN     • UPPER GI ENDOSCOPY,EXAM        Family History   Problem Relation Age of CTAB, no increased work of breathing  ABDOMEN:  soft NT/ND, +BS, no HSM  SKIN: No rashes or skin lesions.  No nail findings  MSK:  Cervical spine: FROM  Hands: no synovitis in DIP, PIP and MCP, strong full fists, TTP at base of 3rd finger volar side  Wrist: MCPs and PIPs and difficulty making a fist, symptoms did improve on a Medrol Dosepak  - Plan to order blood work to evaluate for inflammatory arthritis.   If blood work is unrevealing may have to order ultrasound of the right hand  - Recommend to continue m

## 2021-07-10 NOTE — PATIENT INSTRUCTIONS
You were seen today for joint pain mostly in your hands, neck and lower back  I do think that your symptoms are more from wear-and-tear and probably some tendinitis in your finger but lets rule out rheumatoid arthritis  Blood work today  We may have to get

## 2021-07-10 NOTE — TELEPHONE ENCOUNTER
STAFFANSTORP from [employer] patient called her to schedule a mammogram appointment, but there is nothing on file.

## 2021-07-13 ENCOUNTER — TELEPHONE (OUTPATIENT)
Dept: RHEUMATOLOGY | Facility: CLINIC | Age: 56
End: 2021-07-13

## 2021-07-13 DIAGNOSIS — M54.2 NECK PAIN: Primary | ICD-10-CM

## 2021-08-06 DIAGNOSIS — J01.01 ACUTE RECURRENT MAXILLARY SINUSITIS: ICD-10-CM

## 2021-08-06 RX ORDER — MONTELUKAST SODIUM 10 MG/1
TABLET ORAL
Qty: 90 TABLET | Refills: 1 | Status: SHIPPED | OUTPATIENT
Start: 2021-08-06 | End: 2022-01-19

## 2021-08-06 NOTE — TELEPHONE ENCOUNTER
Refill passed per [place] [facility] protocol.      Requested Prescriptions   Pending Prescriptions Disp Refills    MONTELUKAST SODIUM 10 MG Oral Tab [Pharmacy Med Name: Montelukast Sodium 10 Mg Tab Auro] 90 tablet 0     Sig: TAKE ONE TABLET BY MOUTH AT BEDTIME        Asthma & COPD Medication Protocol Passed - 8/6/2021  2:56 PM        Passed - Appointment in past 6 or next 3 months               Future Appointments         Provider Department Appt Notes    In 3 days Crissy Cordoba MD [facility], [facility], [address], [facility] 4 mths    In 1 month King Juan Recinos MD [facility], [address], [facility] Physical             Recent Outpatient Visits              3 weeks ago Bilateral hand pain    [facility] Thony Friedman MD    Office Visit    2 months ago Other synovitis and tenosynovitis, right hand    [facility],[address] Floor, [address], Milton Cevallos MD    Office Visit    2 months ago Pain of finger of right hand    Lynn Darden [place], [place]    Office Visit    2 months ago Endometrial polyp    [facility] Orlando Kendall MD    Office Visit    3 months ago Essential hypertension    [facility], [facility], [address], [facility]    Nurse Only

## 2021-08-16 ENCOUNTER — TELEPHONE (OUTPATIENT)
Dept: GASTROENTEROLOGY | Facility: CLINIC | Age: 56
End: 2021-08-16

## 2021-08-16 NOTE — TELEPHONE ENCOUNTER
Entered into Epic. Recall follow up call yearly to plan for clinic visit (2/2 issues and/or symptom management) per Dr. Henrry Bledsoe.  Due for follow up call approximately: 03/02/2022

## 2021-08-16 NOTE — TELEPHONE ENCOUNTER
Entered into Epic. Recall CLN in 10 years per Dr. Wendy Pryor. Last CLN done 03/02/2021. Recall entered into Patient Outreach for 03/02/2031. HM updated. Please refer to other TE 08/16/2021 for reminder on follow up call.

## 2021-08-16 NOTE — TELEPHONE ENCOUNTER
----- Message from Rasheed Delgado MD sent at 8/14/2021  3:34 PM CDT -----  GI staff: please place recall in for colonoscopy in 10 years     Follow up yearly unless having issues.  Put in recall please for follow up

## 2021-08-22 RX ORDER — OMEPRAZOLE 40 MG/1
40 CAPSULE, DELAYED RELEASE ORAL
Qty: 180 CAPSULE | Refills: 1 | Status: SHIPPED | OUTPATIENT
Start: 2021-08-22 | End: 2022-01-25

## 2021-08-22 NOTE — TELEPHONE ENCOUNTER
Refill passed per [facility], [facility] protocol.     Requested Prescriptions   Pending Prescriptions Disp Refills    OMEPRAZOLE 40 MG Oral Capsule Delayed Release [Pharmacy Med Name: Omeprazole Dr 40 Mg Cap Nort] 180 capsule 0     Sig: TAKE ONE CAPSULE BY MOUTH

## 2021-08-22 NOTE — TELEPHONE ENCOUNTER
There is  Moderate level drug alert which I cannot override. Please review. Duplicate Therapy: misoprostol, Omeprazole  [employer] AGENTS.  No Abuse/Dependency Potential.

## 2021-09-02 RX ORDER — MECLIZINE HYDROCHLORIDE 25 MG/1
TABLET ORAL
Qty: 20 TABLET | Refills: 0 | Status: SHIPPED | OUTPATIENT
Start: 2021-09-02

## 2021-10-27 DIAGNOSIS — J01.01 ACUTE RECURRENT MAXILLARY SINUSITIS: ICD-10-CM

## 2021-10-27 RX ORDER — LOSARTAN POTASSIUM AND HYDROCHLOROTHIAZIDE 25; 100 MG/1; MG/1
1 TABLET ORAL DAILY
Qty: 90 TABLET | Refills: 1 | Status: SHIPPED | OUTPATIENT
Start: 2021-10-27

## 2021-10-27 RX ORDER — CARVEDILOL 6.25 MG/1
TABLET ORAL
Qty: 270 TABLET | Refills: 1 | Status: SHIPPED | OUTPATIENT
Start: 2021-10-27 | End: 2021-11-04

## 2021-10-27 RX ORDER — LEVOCETIRIZINE DIHYDROCHLORIDE 5 MG/1
5 TABLET, FILM COATED ORAL NIGHTLY
Qty: 90 TABLET | Refills: 1 | Status: SHIPPED | OUTPATIENT
Start: 2021-10-27

## 2021-10-27 NOTE — TELEPHONE ENCOUNTER
Patient called and advised that she is Completely Out of the medications Listed Below. Patient is requesting a refill on the medications. Patient is coming in for an appointment on 11/04      Please Advise.        Please Use Pharmacy: [id number] [facility]

## 2021-10-27 NOTE — TELEPHONE ENCOUNTER
See Pt message  Losartan on Pt Hx   Refill passed per [facility], [facility] protocol.       Requested Prescriptions   Pending Prescriptions Disp Refills    carvedilol 6.25 MG Oral Tab [Pharmacy Med Name: Carvedilol 6.25 Mg Tab Teva] 135 tablet 1     Sig: Take 1 1 Visit    5 months ago Pain of finger of right hand    Ruben Solo MD    Office Visit    5 months ago Endometrial polyp    [facility] Clarence Joshi MD    Office [place]

## 2021-11-04 ENCOUNTER — LAB ENCOUNTER (OUTPATIENT)
Dept: LAB | Facility: HOSPITAL | Age: 56
End: 2021-11-04
Attending: INTERNAL MEDICINE
Payer: COMMERCIAL

## 2021-11-04 ENCOUNTER — OFFICE VISIT (OUTPATIENT)
Dept: OBGYN CLINIC | Facility: CLINIC | Age: 56
End: 2021-11-04

## 2021-11-04 ENCOUNTER — OFFICE VISIT (OUTPATIENT)
Dept: INTERNAL MEDICINE CLINIC | Facility: CLINIC | Age: 56
End: 2021-11-04

## 2021-11-04 VITALS
WEIGHT: 192 LBS | HEART RATE: 63 BPM | BODY MASS INDEX: 32.78 KG/M2 | RESPIRATION RATE: 16 BRPM | HEIGHT: 64 IN | TEMPERATURE: 98 F | SYSTOLIC BLOOD PRESSURE: 125 MMHG | DIASTOLIC BLOOD PRESSURE: 80 MMHG

## 2021-11-04 VITALS
HEART RATE: 69 BPM | DIASTOLIC BLOOD PRESSURE: 89 MMHG | SYSTOLIC BLOOD PRESSURE: 167 MMHG | BODY MASS INDEX: 20 KG/M2 | WEIGHT: 119 LBS

## 2021-11-04 DIAGNOSIS — E11.9 TYPE 2 DIABETES MELLITUS WITHOUT COMPLICATION, WITHOUT LONG-TERM CURRENT USE OF INSULIN (HCC): ICD-10-CM

## 2021-11-04 DIAGNOSIS — E55.9 VITAMIN D DEFICIENCY: ICD-10-CM

## 2021-11-04 DIAGNOSIS — D50.0 IRON DEFICIENCY ANEMIA DUE TO CHRONIC BLOOD LOSS: ICD-10-CM

## 2021-11-04 DIAGNOSIS — I10 HIGH BLOOD PRESSURE DETERMINED BY EXAMINATION: ICD-10-CM

## 2021-11-04 DIAGNOSIS — M79.641 BILATERAL HAND PAIN: ICD-10-CM

## 2021-11-04 DIAGNOSIS — Z01.411 ENCOUNTER FOR GYNECOLOGICAL EXAMINATION WITH ABNORMAL FINDING: Primary | ICD-10-CM

## 2021-11-04 DIAGNOSIS — Z12.31 VISIT FOR SCREENING MAMMOGRAM: ICD-10-CM

## 2021-11-04 DIAGNOSIS — M79.642 BILATERAL HAND PAIN: ICD-10-CM

## 2021-11-04 DIAGNOSIS — I10 PRIMARY HYPERTENSION: Primary | ICD-10-CM

## 2021-11-04 DIAGNOSIS — M54.2 NECK PAIN: ICD-10-CM

## 2021-11-04 PROCEDURE — 90471 IMMUNIZATION ADMIN: CPT | Performed by: INTERNAL MEDICINE

## 2021-11-04 PROCEDURE — 99214 OFFICE O/P EST MOD 30 MIN: CPT | Performed by: INTERNAL MEDICINE

## 2021-11-04 PROCEDURE — 3077F SYST BP >= 140 MM HG: CPT | Performed by: OBSTETRICS & GYNECOLOGY

## 2021-11-04 PROCEDURE — 99396 PREV VISIT EST AGE 40-64: CPT | Performed by: OBSTETRICS & GYNECOLOGY

## 2021-11-04 PROCEDURE — 86812 HLA TYPING A B OR C: CPT

## 2021-11-04 PROCEDURE — 3079F DIAST BP 80-89 MM HG: CPT | Performed by: INTERNAL MEDICINE

## 2021-11-04 PROCEDURE — 3074F SYST BP LT 130 MM HG: CPT | Performed by: INTERNAL MEDICINE

## 2021-11-04 PROCEDURE — 86200 CCP ANTIBODY: CPT | Performed by: INTERNAL MEDICINE

## 2021-11-04 PROCEDURE — 86431 RHEUMATOID FACTOR QUANT: CPT | Performed by: INTERNAL MEDICINE

## 2021-11-04 PROCEDURE — 85652 RBC SED RATE AUTOMATED: CPT | Performed by: INTERNAL MEDICINE

## 2021-11-04 PROCEDURE — 3008F BODY MASS INDEX DOCD: CPT | Performed by: INTERNAL MEDICINE

## 2021-11-04 PROCEDURE — 86140 C-REACTIVE PROTEIN: CPT | Performed by: INTERNAL MEDICINE

## 2021-11-04 PROCEDURE — 36415 COLL VENOUS BLD VENIPUNCTURE: CPT | Performed by: INTERNAL MEDICINE

## 2021-11-04 PROCEDURE — 90686 IIV4 VACC NO PRSV 0.5 ML IM: CPT | Performed by: INTERNAL MEDICINE

## 2021-11-04 PROCEDURE — 3079F DIAST BP 80-89 MM HG: CPT | Performed by: OBSTETRICS & GYNECOLOGY

## 2021-11-04 RX ORDER — CARVEDILOL 12.5 MG/1
12.5 TABLET ORAL 2 TIMES DAILY WITH MEALS
Qty: 180 TABLET | Refills: 1 | Status: SHIPPED | OUTPATIENT
Start: 2021-11-04 | End: 2022-10-30

## 2021-11-04 NOTE — PROGRESS NOTES
HPI:    Patient ID: Tia Serra is a 61year old female. Test for diabetes-hemoglobin A1c remains stable at 6.5.   Cholesterol levels-LDLs remain elevated at 137 and will need to be treated.  We will discuss this at your next visit. Shira Shaffer see aggravates the symptoms. Associated symptoms include an irregular heartbeat and malaise/fatigue. Pertinent negatives include no shortness of breath or syncope. She has tried beta blockers for the symptoms. The treatment provided no relief.  Risk factors inc (two) times daily. 3 Bottle 1   • Diclofenac Sodium 1 % Transdermal Gel APPLY TO THE AFFECTED AREA TWICE DAILY AS NEEDED 100 g 1   • Fluocinolone Acetonide Scalp 0.01 % External Oil External application as directed .  118 mL 2   • Mometasone Furoate 0.1 % E distension. Palpations: There is no mass. Tenderness: There is no abdominal tenderness. There is no rebound. Musculoskeletal:         General: No tenderness. Normal range of motion. Cervical back: Neck supple.       Right lower leg: No lisa statin. Blood pressures have been stable on current medications–losartan, carvedilol . Radha Roch functions have been stable at this time. Advised to continue to monitor her diet carefully, recheck labs and follow-up. Immunizations are up-to-date.

## 2021-11-04 NOTE — ASSESSMENT & PLAN NOTE
T2DM, currently on diet control alone. Overdue for labs. Lipid panel did show slight elevation in her LDL cholesterol, she is reluctant to start on a statin. Blood pressures have been stable on current medications–losartan, carvedilol . Shakira Ronquillo functions

## 2021-11-04 NOTE — PROGRESS NOTES
Angelia Acosta is a 61year old female [id number] No LMP recorded (lmp unknown). Patient is postmenopausal. Patient presents with:  Gyn Exam: Annual -- has appt w/  immediately following mine. Will d/w her her BP. No more  bleed  .     OBSTETRICS H Grandfather    • Thyroid disease Sister    • Thyroid disease Brother    • Macular degeneration Neg    • Glaucoma Neg        MEDICATIONS:    Current Outpatient Medications:   •  carvedilol 6.25 MG Oral Tab, Take 1 1/2 tablet twice a day, Disp: 270 tablet, R Rfl:   •  EPINEPHrine (EPIPEN 2-MARLENE) 0.3 MG/0.3ML Injection Solution Auto-injector, Inject IM in event of  allergic reaction, Disp: 1 each, Rfl: 0  •  Diclofenac Sodium 1 % Transdermal Gel, APPLY TO THE AFFECTED AREA TWICE DAILY AS NEEDED (Patient not taki cyanosis  Psychiatric:   oriented to time, place, person and situation.  Appropriate mood and affect    Pelvic Exam:  External Genitalia:  normal appearance, hair distribution, and no lesions  Urethral Meatus:   normal in size, location, without lesions and

## 2021-11-04 NOTE — ASSESSMENT & PLAN NOTE
Blood pressure 125/80, pulse 63, temperature 98 °F (36.7 °C), temperature source Temporal, resp. rate 16, height 5' 4\" (1.626 m), weight 192 lb (87.1 kg), not currently breastfeeding. Blood pressure looks stable, very well controlled at this time.   Co

## 2021-11-04 NOTE — PATIENT INSTRUCTIONS
Problem List Items Addressed This Visit        Unprioritized    Anemia    Relevant Orders    VITAMIN B12    Hypertension - Primary     Blood pressure 125/80, pulse 63, temperature 98 °F (36.7 °C), temperature source Temporal, resp.  rate 16, height 5' 4\" (

## 2021-12-02 ENCOUNTER — HOSPITAL ENCOUNTER (OUTPATIENT)
Dept: MAMMOGRAPHY | Facility: HOSPITAL | Age: 56
Discharge: HOME | End: 2021-12-02
Attending: INTERNAL MEDICINE
Payer: COMMERCIAL

## 2021-12-02 ENCOUNTER — LAB ENCOUNTER (OUTPATIENT)
Dept: LAB | Facility: HOSPITAL | Age: 56
End: 2021-12-02
Attending: INTERNAL MEDICINE
Payer: COMMERCIAL

## 2021-12-02 DIAGNOSIS — E55.9 VITAMIN D DEFICIENCY: ICD-10-CM

## 2021-12-02 DIAGNOSIS — D50.0 IRON DEFICIENCY ANEMIA DUE TO CHRONIC BLOOD LOSS: ICD-10-CM

## 2021-12-02 DIAGNOSIS — Z12.31 BREAST CANCER SCREENING BY MAMMOGRAM: ICD-10-CM

## 2021-12-02 DIAGNOSIS — E11.9 TYPE 2 DIABETES MELLITUS WITHOUT COMPLICATION, WITHOUT LONG-TERM CURRENT USE OF INSULIN (HCC): ICD-10-CM

## 2021-12-02 PROCEDURE — 84443 ASSAY THYROID STIM HORMONE: CPT

## 2021-12-02 PROCEDURE — 83036 HEMOGLOBIN GLYCOSYLATED A1C: CPT

## 2021-12-02 PROCEDURE — 81001 URINALYSIS AUTO W/SCOPE: CPT

## 2021-12-02 PROCEDURE — 85025 COMPLETE CBC W/AUTO DIFF WBC: CPT

## 2021-12-02 PROCEDURE — 82607 VITAMIN B-12: CPT

## 2021-12-02 PROCEDURE — 80053 COMPREHEN METABOLIC PANEL: CPT

## 2021-12-02 PROCEDURE — 77063 BREAST TOMOSYNTHESIS BI: CPT | Performed by: INTERNAL MEDICINE

## 2021-12-02 PROCEDURE — 77067 SCR MAMMO BI INCL CAD: CPT | Performed by: INTERNAL MEDICINE

## 2021-12-02 PROCEDURE — 82306 VITAMIN D 25 HYDROXY: CPT

## 2021-12-02 PROCEDURE — 80061 LIPID PANEL: CPT

## 2021-12-02 PROCEDURE — 36415 COLL VENOUS BLD VENIPUNCTURE: CPT

## 2021-12-02 PROCEDURE — 82043 UR ALBUMIN QUANTITATIVE: CPT

## 2021-12-02 PROCEDURE — 82570 ASSAY OF URINE CREATININE: CPT

## 2021-12-12 ENCOUNTER — PATIENT MESSAGE (OUTPATIENT)
Dept: INTERNAL MEDICINE CLINIC | Facility: CLINIC | Age: 56
End: 2021-12-12

## 2021-12-12 RX ORDER — ERGOCALCIFEROL 1.25 MG/1
50000 CAPSULE ORAL WEEKLY
Qty: 24 CAPSULE | Refills: 0 | Status: SHIPPED | OUTPATIENT
Start: 2021-12-12

## 2021-12-12 NOTE — TELEPHONE ENCOUNTER
From: Dre Rodriguez  To:  Yvon Huggins MD  Sent: 12/12/2021 12:14 AM CST  Subject: Vitamin D    Waiting for vitamin D prescription

## 2021-12-14 ENCOUNTER — TELEPHONE (OUTPATIENT)
Dept: INTERNAL MEDICINE CLINIC | Facility: CLINIC | Age: 56
End: 2021-12-14

## 2021-12-14 ENCOUNTER — OFFICE VISIT (OUTPATIENT)
Dept: PODIATRY CLINIC | Facility: CLINIC | Age: 56
End: 2021-12-14

## 2021-12-14 DIAGNOSIS — M72.2 PLANTAR FASCIITIS, BILATERAL: ICD-10-CM

## 2021-12-14 DIAGNOSIS — M79.672 PAIN IN BOTH FEET: Primary | ICD-10-CM

## 2021-12-14 DIAGNOSIS — M21.42 FLAT FEET, BILATERAL: Primary | ICD-10-CM

## 2021-12-14 DIAGNOSIS — M21.41 FLAT FEET, BILATERAL: Primary | ICD-10-CM

## 2021-12-14 DIAGNOSIS — M79.671 PAIN IN BOTH FEET: Primary | ICD-10-CM

## 2021-12-14 PROCEDURE — 99243 OFF/OP CNSLTJ NEW/EST LOW 30: CPT | Performed by: PODIATRIST

## 2021-12-14 NOTE — TELEPHONE ENCOUNTER
Patient needs another referral for podiatry, patient has appointment scheduled for 1/4/2022 with Jimmy Delvalle.

## 2021-12-14 NOTE — PROGRESS NOTES
HPI:    Patient ID: Tia Serra is a 61year old female. Pleasant 50-year-old female presents as new patient to me on consult from [address] [place] [address] [facility] Patient's chief concern is pain associated with both of her feet.   The pain is been present for well ov allergic reaction 1 each 0   • Azelastine HCl 137 MCG/SPRAY Nasal Solution 1 puff by Nasal route 2 (two) times daily.  3 Bottle 1   • Diclofenac Sodium 1 % Transdermal Gel APPLY TO THE AFFECTED AREA TWICE DAILY AS NEEDED 100 g 1   • Fluocinolone Acetonide S prescriptions requested or ordered in this encounter       Imaging & Referrals:  None       [id number]

## 2022-01-04 ENCOUNTER — OFFICE VISIT (OUTPATIENT)
Dept: PODIATRY CLINIC | Facility: CLINIC | Age: 57
End: 2022-01-04
Payer: COMMERCIAL

## 2022-01-04 DIAGNOSIS — M72.2 PLANTAR FASCIITIS, BILATERAL: Primary | ICD-10-CM

## 2022-01-04 PROCEDURE — 99213 OFFICE O/P EST LOW 20 MIN: CPT | Performed by: PODIATRIST

## 2022-01-04 NOTE — PROGRESS NOTES
HPI:    Patient ID: Justin Marsh is a 61year old female. This 62-year-old female presents for follow-up in reference to the benefits of arch support in reference to relieving her symptoms.   She states that the modification to her insoles has made Furoate 0.1 % External Cream External application 2 times daily as directed. 45 g 0   • Cyanocobalamin (VITAMIN B-12) 1000 MCG Sublingual SL Tab Place under the tongue.  take 1 Tablet by Sublingual route  every day       Allergies:  Sulfa Antibiotics

## 2022-01-05 DIAGNOSIS — J01.01 ACUTE RECURRENT MAXILLARY SINUSITIS: ICD-10-CM

## 2022-01-07 RX ORDER — AZELASTINE 1 MG/ML
1 SPRAY, METERED NASAL 2 TIMES DAILY
Qty: 3 EACH | Refills: 1 | Status: SHIPPED | OUTPATIENT
Start: 2022-01-07

## 2022-01-18 DIAGNOSIS — J01.01 ACUTE RECURRENT MAXILLARY SINUSITIS: ICD-10-CM

## 2022-01-19 RX ORDER — MONTELUKAST SODIUM 10 MG/1
TABLET ORAL
Qty: 90 TABLET | Refills: 1 | Status: SHIPPED | OUTPATIENT
Start: 2022-01-19 | End: 2022-05-19

## 2022-01-19 NOTE — TELEPHONE ENCOUNTER
Refill passed per [address] [facility] protocol.      Requested Prescriptions   Pending Prescriptions Disp Refills    MONTELUKAST 10 MG Oral Tab [Pharmacy Med Name: Montelukast Sodium 10 Mg Tab Auro] 90 tablet 0     Sig: TAKE ONE TABLET BY MOUTH AT BEDTIME        Asthma & COPD Medication Protocol Passed - 1/18/2022 11:57 PM        Passed - Appointment in past 6 or next 3 months                Recent Outpatient Visits              2 weeks ago Plantar fasciitis, bilateral    [facility], [address], [facility]    Office Visit    1 month ago Pain in both [address] [place] [address], [address], [facility] [place], [place]    Office Visit    2 months ago Primary hypertension    [address], [address], Mohan Kincaid MD    Office Visit    2 months ago Encounter for gynecological examination with abnormal finding    [facility] [place], [address], Adelina Smith MD    Office Visit    6 months ago Bilateral hand pain    [facility] [place], [address], Lisandro Ramsay MD    Office Visit             Future Appointments         Provider Department Appt Notes    In 6 days Leila Thompson [facility], [address], Shanelle 3 wk fu

## 2022-01-25 RX ORDER — OMEPRAZOLE 40 MG/1
40 CAPSULE, DELAYED RELEASE ORAL
Qty: 180 CAPSULE | Refills: 1 | Status: SHIPPED | OUTPATIENT
Start: 2022-01-25 | End: 2022-05-19

## 2022-01-25 NOTE — TELEPHONE ENCOUNTER
Refill passed per [facility] protocol. Requested Prescriptions   Pending Prescriptions Disp Refills    OMEPRAZOLE 40 MG Oral Capsule Delayed Release [Pharmacy Med Name: Omeprazole Dr 40 Mg Cap Nort] 180 capsule 0     Sig: Take 1 capsule by mouth 2  times daily before meals.         Gastrointestional Medication Protocol Passed - 1/25/2022  1:34 AM        Passed - Appointment in past 12 or next 3 months                Recent Outpatient Visits              3 weeks ago Plantar fasciitis, bilateral    [facility], [address], [address] , [facility]    Office Visit    1 month ago Pain in both [facility], [address], [address] , [facility] [place], [place]    Office Visit    2 months ago Primary hypertension    [address] [place], [address], Bonny Christian MD    Office Visit    2 months ago Encounter for gynecological examination with abnormal finding    [facility] [place], [address], Ese Mcmullen MD    Office Visit    6 months ago Bilateral hand pain    [facility] [place], [address], Luis F Watson MD    Office Visit             Future Appointments         Provider Department Appt Notes    Tomorrow Annel Choudhury [address] [place] [address], [address] 3 wk fu, need ref, policy informed

## 2022-02-07 NOTE — TELEPHONE ENCOUNTER
Not an issue [de-identified] : recent neuro eval for tremors\par had mri and sinus cyst noted\par no co nasal congestion neg hx sinusitis\par no ear plugging or hearing loss\par hx bppv now resolved

## 2022-03-09 ENCOUNTER — TELEPHONE (OUTPATIENT)
Dept: GASTROENTEROLOGY | Facility: CLINIC | Age: 57
End: 2022-03-09

## 2022-03-09 NOTE — TELEPHONE ENCOUNTER
Patient outreach message received:    Entered into Epic. Recall follow up call yearly to plan for clinic visit (2/2 issues and/or symptom management) per Dr. Wisam Guzman.  Due for follow up call approximately: 03/02/2022

## 2022-03-09 NOTE — TELEPHONE ENCOUNTER
Left message to call back. 1st attempt. Will await call back and/or follow up. Obtain symptom update and assist with scheduling annual appointment with Dr. Jennifer Spurling if needed/appropriate.

## 2022-03-18 NOTE — TELEPHONE ENCOUNTER
Left message to call back. 2nd attempt. Will await call back and/or follow up. Obtain symptom update and assist with scheduling annual appointment with Dr. Yury Chahal if needed/appropriate.

## 2022-03-25 NOTE — TELEPHONE ENCOUNTER
Attempted to reach [facility] to assist with scheduling annual f/u visit, if indicated. Left message to call back. 3rd attempt. Multiple attempts with no return call. TE closed.

## 2022-04-07 ENCOUNTER — TELEPHONE (OUTPATIENT)
Dept: PODIATRY CLINIC | Facility: CLINIC | Age: 57
End: 2022-04-07

## 2022-05-07 ENCOUNTER — TELEPHONE (OUTPATIENT)
Dept: INTERNAL MEDICINE CLINIC | Facility: CLINIC | Age: 57
End: 2022-05-07

## 2022-05-09 ENCOUNTER — TELEPHONE (OUTPATIENT)
Dept: PODIATRY CLINIC | Facility: CLINIC | Age: 57
End: 2022-05-09

## 2022-05-09 NOTE — TELEPHONE ENCOUNTER
Per pt has new insurance, needing PA for orthotics sent to insurance.  [facility] PPO ph #[phone]

## 2022-05-10 NOTE — TELEPHONE ENCOUNTER
Called pt LMTCB- if pt CB please verify her ne [employer] PPO insurance. If in fact she does have new insurance we will need to reach out to insurance. She will need to RS her appt on 5/11 until we have approval from insurance.

## 2022-05-10 NOTE — TELEPHONE ENCOUNTER
PA dept- please see referral request from 1/4/22, pt had HMO referral. Now pt had [employer].  Please check if PA needed when available to do so

## 2022-05-11 NOTE — TELEPHONE ENCOUNTER
Had to create new referral-view only available for prior referral.   [facility], and benefit only if related to diabetes, see referral for details. Prior [employer] is not required but predetermination is recommended. How do you want to proceed? Clothing

## 2022-05-19 ENCOUNTER — OFFICE VISIT (OUTPATIENT)
Dept: INTERNAL MEDICINE CLINIC | Facility: CLINIC | Age: 57
End: 2022-05-19
Payer: COMMERCIAL

## 2022-05-19 ENCOUNTER — TELEPHONE (OUTPATIENT)
Dept: OPHTHALMOLOGY | Facility: CLINIC | Age: 57
End: 2022-05-19

## 2022-05-19 ENCOUNTER — TELEPHONE (OUTPATIENT)
Dept: INTERNAL MEDICINE CLINIC | Facility: CLINIC | Age: 57
End: 2022-05-19

## 2022-05-19 ENCOUNTER — LAB ENCOUNTER (OUTPATIENT)
Dept: LAB | Facility: HOSPITAL | Age: 57
End: 2022-05-19
Attending: INTERNAL MEDICINE
Payer: COMMERCIAL

## 2022-05-19 VITALS
DIASTOLIC BLOOD PRESSURE: 85 MMHG | HEIGHT: 66 IN | TEMPERATURE: 98 F | BODY MASS INDEX: 29.89 KG/M2 | HEART RATE: 56 BPM | SYSTOLIC BLOOD PRESSURE: 135 MMHG | WEIGHT: 186 LBS | OXYGEN SATURATION: 97 %

## 2022-05-19 DIAGNOSIS — M54.2 NECK PAIN: ICD-10-CM

## 2022-05-19 DIAGNOSIS — G47.33 OSA (OBSTRUCTIVE SLEEP APNEA): ICD-10-CM

## 2022-05-19 DIAGNOSIS — I10 PRIMARY HYPERTENSION: ICD-10-CM

## 2022-05-19 DIAGNOSIS — Z00.00 ANNUAL PHYSICAL EXAM: ICD-10-CM

## 2022-05-19 DIAGNOSIS — E53.8 B12 DEFICIENCY: ICD-10-CM

## 2022-05-19 DIAGNOSIS — Z00.00 ANNUAL PHYSICAL EXAM: Primary | ICD-10-CM

## 2022-05-19 DIAGNOSIS — E55.9 VITAMIN D DEFICIENCY: ICD-10-CM

## 2022-05-19 DIAGNOSIS — Z12.31 ENCOUNTER FOR SCREENING MAMMOGRAM FOR MALIGNANT NEOPLASM OF BREAST: ICD-10-CM

## 2022-05-19 DIAGNOSIS — E11.00 TYPE 2 DIABETES MELLITUS WITH HYPEROSMOLARITY WITHOUT COMA, WITHOUT LONG-TERM CURRENT USE OF INSULIN (HCC): ICD-10-CM

## 2022-05-19 DIAGNOSIS — R10.2 PELVIC PAIN: ICD-10-CM

## 2022-05-19 DIAGNOSIS — J01.01 ACUTE RECURRENT MAXILLARY SINUSITIS: ICD-10-CM

## 2022-05-19 PROBLEM — R30.0 DYSURIA: Status: RESOLVED | Noted: 2018-07-12 | Resolved: 2022-05-19

## 2022-05-19 LAB
ALBUMIN SERPL-MCNC: 3.5 G/DL (ref 3.4–5)
ALBUMIN/GLOB SERPL: 0.8 {RATIO} (ref 1–2)
ALP LIVER SERPL-CCNC: 70 U/L
ALT SERPL-CCNC: 16 U/L
ANION GAP SERPL CALC-SCNC: 7 MMOL/L (ref 0–18)
AST SERPL-CCNC: 11 U/L (ref 15–37)
BASOPHILS # BLD AUTO: 0.03 X10(3) UL (ref 0–0.2)
BASOPHILS NFR BLD AUTO: 0.6 %
BILIRUB SERPL-MCNC: 0.3 MG/DL (ref 0.1–2)
BILIRUB UR QL: NEGATIVE
BUN BLD-MCNC: 12 MG/DL (ref 7–18)
BUN/CREAT SERPL: 16.9 (ref 10–20)
CALCIUM BLD-MCNC: 9.2 MG/DL (ref 8.5–10.1)
CHLORIDE SERPL-SCNC: 103 MMOL/L (ref 98–112)
CHOLEST SERPL-MCNC: 191 MG/DL (ref ?–200)
CLARITY UR: CLEAR
CO2 SERPL-SCNC: 28 MMOL/L (ref 21–32)
COLOR UR: YELLOW
CREAT BLD-MCNC: 0.71 MG/DL
CREAT UR-SCNC: 86.9 MG/DL
DEPRECATED RDW RBC AUTO: 48.7 FL (ref 35.1–46.3)
EOSINOPHIL # BLD AUTO: 0.17 X10(3) UL (ref 0–0.7)
EOSINOPHIL NFR BLD AUTO: 3.5 %
ERYTHROCYTE [DISTWIDTH] IN BLOOD BY AUTOMATED COUNT: 14.7 % (ref 11–15)
EST. AVERAGE GLUCOSE BLD GHB EST-MCNC: 137 MG/DL (ref 68–126)
FASTING PATIENT LIPID ANSWER: YES
FASTING STATUS PATIENT QL REPORTED: YES
GLOBULIN PLAS-MCNC: 4.6 G/DL (ref 2.8–4.4)
GLUCOSE BLD-MCNC: 126 MG/DL (ref 70–99)
GLUCOSE UR-MCNC: NEGATIVE MG/DL
HBA1C MFR BLD: 6.4 % (ref ?–5.7)
HCT VFR BLD AUTO: 37.9 %
HDLC SERPL-MCNC: 64 MG/DL (ref 40–59)
HGB BLD-MCNC: 11.9 G/DL
HGB UR QL STRIP.AUTO: NEGATIVE
IMM GRANULOCYTES # BLD AUTO: 0.01 X10(3) UL (ref 0–1)
IMM GRANULOCYTES NFR BLD: 0.2 %
KETONES UR-MCNC: NEGATIVE MG/DL
LDLC SERPL CALC-MCNC: 109 MG/DL (ref ?–100)
LEUKOCYTE ESTERASE UR QL STRIP.AUTO: NEGATIVE
LYMPHOCYTES # BLD AUTO: 1.92 X10(3) UL (ref 1–4)
LYMPHOCYTES NFR BLD AUTO: 39.3 %
MCH RBC QN AUTO: 28.1 PG (ref 26–34)
MCHC RBC AUTO-ENTMCNC: 31.4 G/DL (ref 31–37)
MCV RBC AUTO: 89.6 FL
MICROALBUMIN UR-MCNC: 0.86 MG/DL
MICROALBUMIN/CREAT 24H UR-RTO: 9.9 UG/MG (ref ?–30)
MONOCYTES # BLD AUTO: 0.48 X10(3) UL (ref 0.1–1)
MONOCYTES NFR BLD AUTO: 9.8 %
NEUTROPHILS # BLD AUTO: 2.27 X10 (3) UL (ref 1.5–7.7)
NEUTROPHILS # BLD AUTO: 2.27 X10(3) UL (ref 1.5–7.7)
NEUTROPHILS NFR BLD AUTO: 46.6 %
NITRITE UR QL STRIP.AUTO: NEGATIVE
NONHDLC SERPL-MCNC: 127 MG/DL (ref ?–130)
OSMOLALITY SERPL CALC.SUM OF ELEC: 287 MOSM/KG (ref 275–295)
PH UR: 7 [PH] (ref 5–8)
PLATELET # BLD AUTO: 291 10(3)UL (ref 150–450)
POTASSIUM SERPL-SCNC: 3.7 MMOL/L (ref 3.5–5.1)
PROT SERPL-MCNC: 8.1 G/DL (ref 6.4–8.2)
PROT UR-MCNC: NEGATIVE MG/DL
RBC # BLD AUTO: 4.23 X10(6)UL
SODIUM SERPL-SCNC: 138 MMOL/L (ref 136–145)
SP GR UR STRIP: 1.02 (ref 1–1.03)
TRIGL SERPL-MCNC: 103 MG/DL (ref 30–149)
TSI SER-ACNC: 2.43 MIU/ML (ref 0.36–3.74)
UROBILINOGEN UR STRIP-ACNC: <2
VIT B12 SERPL-MCNC: >2000 PG/ML (ref 193–986)
VIT C UR-MCNC: NEGATIVE MG/DL
VIT D+METAB SERPL-MCNC: 42.8 NG/ML (ref 30–100)
VLDLC SERPL CALC-MCNC: 17 MG/DL (ref 0–30)
WBC # BLD AUTO: 4.9 X10(3) UL (ref 4–11)

## 2022-05-19 PROCEDURE — 3079F DIAST BP 80-89 MM HG: CPT | Performed by: INTERNAL MEDICINE

## 2022-05-19 PROCEDURE — 82570 ASSAY OF URINE CREATININE: CPT

## 2022-05-19 PROCEDURE — 80053 COMPREHEN METABOLIC PANEL: CPT

## 2022-05-19 PROCEDURE — 84443 ASSAY THYROID STIM HORMONE: CPT

## 2022-05-19 PROCEDURE — 82043 UR ALBUMIN QUANTITATIVE: CPT

## 2022-05-19 PROCEDURE — 82306 VITAMIN D 25 HYDROXY: CPT

## 2022-05-19 PROCEDURE — 99396 PREV VISIT EST AGE 40-64: CPT | Performed by: INTERNAL MEDICINE

## 2022-05-19 PROCEDURE — 85025 COMPLETE CBC W/AUTO DIFF WBC: CPT

## 2022-05-19 PROCEDURE — 3008F BODY MASS INDEX DOCD: CPT | Performed by: INTERNAL MEDICINE

## 2022-05-19 PROCEDURE — 81003 URINALYSIS AUTO W/O SCOPE: CPT

## 2022-05-19 PROCEDURE — 82607 VITAMIN B-12: CPT

## 2022-05-19 PROCEDURE — 3075F SYST BP GE 130 - 139MM HG: CPT | Performed by: INTERNAL MEDICINE

## 2022-05-19 PROCEDURE — 80061 LIPID PANEL: CPT

## 2022-05-19 PROCEDURE — 36415 COLL VENOUS BLD VENIPUNCTURE: CPT

## 2022-05-19 PROCEDURE — 83036 HEMOGLOBIN GLYCOSYLATED A1C: CPT

## 2022-05-19 RX ORDER — CARVEDILOL 12.5 MG/1
12.5 TABLET ORAL 2 TIMES DAILY WITH MEALS
Qty: 180 TABLET | Refills: 1 | Status: SHIPPED | OUTPATIENT
Start: 2022-05-19 | End: 2023-05-14

## 2022-05-19 RX ORDER — MONTELUKAST SODIUM 10 MG/1
TABLET ORAL
Qty: 90 TABLET | Refills: 1 | Status: SHIPPED | OUTPATIENT
Start: 2022-05-19

## 2022-05-19 RX ORDER — MECLIZINE HYDROCHLORIDE 25 MG/1
25 TABLET ORAL 3 TIMES DAILY PRN
Qty: 20 TABLET | Refills: 0 | Status: SHIPPED | OUTPATIENT
Start: 2022-05-19

## 2022-05-19 RX ORDER — MELOXICAM 15 MG/1
TABLET ORAL
Qty: 30 TABLET | Refills: 0 | Status: SHIPPED | OUTPATIENT
Start: 2022-05-19

## 2022-05-19 RX ORDER — OMEPRAZOLE 40 MG/1
40 CAPSULE, DELAYED RELEASE ORAL
Qty: 180 CAPSULE | Refills: 1 | Status: SHIPPED | OUTPATIENT
Start: 2022-05-19

## 2022-05-19 RX ORDER — LOSARTAN POTASSIUM AND HYDROCHLOROTHIAZIDE 25; 100 MG/1; MG/1
1 TABLET ORAL DAILY
Qty: 90 TABLET | Refills: 1 | Status: SHIPPED | OUTPATIENT
Start: 2022-05-19

## 2022-05-19 RX ORDER — FLUOCINOLONE ACETONIDE 0.11 MG/ML
OIL TOPICAL
Qty: 118 ML | Refills: 2 | Status: SHIPPED | OUTPATIENT
Start: 2022-05-19

## 2022-05-25 NOTE — TELEPHONE ENCOUNTER
Spoke with patient. Explained to patient that Dr. Darin Chang does not see adults unless they are an established patient or they have diplopia. Appointment was booked with Dr. Letty Moreira on 6/14/22 at 3:45/ I advised patient that the appointment will be up to 2 hours and that their eyes will be dilated. Patient should bring sunglasses and someone to drive if they think they will be uncomfortable driving. They should bring their current glasses and any eye drops that they take.

## 2022-05-25 NOTE — TELEPHONE ENCOUNTER
Since this patient has been attempted to be reached several times with no response, will close out encounter.

## 2022-05-27 NOTE — TELEPHONE ENCOUNTER
Duplicate message please see patient mychart message, Mackenzie Springer spoke to patient and advised her to call insurance.

## 2022-05-27 NOTE — TELEPHONE ENCOUNTER
Called Romulo/YULIA -[phone], to ask status of Pre-Determination submitted 5/11. Benefit plan will not perform pre-determination. Recommend patient call benefit plan to confirm benefits. Must bill with diabetes diagnosis.

## 2022-06-09 ENCOUNTER — HOSPITAL ENCOUNTER (OUTPATIENT)
Dept: ULTRASOUND IMAGING | Facility: HOSPITAL | Age: 57
Discharge: HOME | End: 2022-06-09
Attending: INTERNAL MEDICINE
Payer: COMMERCIAL

## 2022-06-09 DIAGNOSIS — R10.2 PELVIC PAIN: ICD-10-CM

## 2022-06-09 PROCEDURE — 76830 TRANSVAGINAL US NON-OB: CPT | Performed by: INTERNAL MEDICINE

## 2022-06-09 PROCEDURE — 76856 US EXAM PELVIC COMPLETE: CPT | Performed by: INTERNAL MEDICINE

## 2022-06-14 ENCOUNTER — OFFICE VISIT (OUTPATIENT)
Dept: OPHTHALMOLOGY | Facility: CLINIC | Age: 57
End: 2022-06-14
Payer: COMMERCIAL

## 2022-06-14 DIAGNOSIS — H43.392 VITREOUS FLOATERS OF LEFT EYE: ICD-10-CM

## 2022-06-14 DIAGNOSIS — H25.13 AGE-RELATED NUCLEAR CATARACT OF BOTH EYES: ICD-10-CM

## 2022-06-14 DIAGNOSIS — E11.9 DIET-CONTROLLED DIABETES MELLITUS (HCC): Primary | ICD-10-CM

## 2022-06-14 PROCEDURE — 92004 COMPRE OPH EXAM NEW PT 1/>: CPT | Performed by: OPHTHALMOLOGY

## 2022-06-14 PROCEDURE — 92015 DETERMINE REFRACTIVE STATE: CPT | Performed by: OPHTHALMOLOGY

## 2022-06-14 NOTE — PATIENT INSTRUCTIONS
Diet-controlled diabetes mellitus ([facility] [address]  Diet Controlled Diabetes:  no background of retinopathy, no signs of neovascularization noted. Discussed ocular and systemic benefits of blood sugar control. Diagnosis and treatment discussed in detail with patient. Vitreous floaters of left eye   There is no evidence of retinal pathology. All signs and symptoms of retinal detachment/tears explained in detail. Patient instructed to call the office if they experience increase in floaters, increase in flashes of light, loss of vision or curtain or veil effect. Age-related nuclear cataract of both eyes  Discussed early cataracts with patient. Told patient that cataracts are age appropriate and they are not surgical at this time. No treatment recommended at this time.

## 2022-06-14 NOTE — ASSESSMENT & PLAN NOTE
Diet Controlled Diabetes:  no background of retinopathy, no signs of neovascularization noted. Discussed ocular and systemic benefits of blood sugar control. Diagnosis and treatment discussed in detail with patient.

## 2022-07-16 DIAGNOSIS — J01.01 ACUTE RECURRENT MAXILLARY SINUSITIS: ICD-10-CM

## 2022-07-16 NOTE — TELEPHONE ENCOUNTER
Pt. Was notified and told it has not been authorized yet. Subjective


Progress Note Date: 07/16/22


66-year-old female admitted yesterday with acute diverticulitis.  She was 

started on IV Flagyl and levofloxacin, IV fluids, and IV narcotics.  Her 

abdominal pain has significantly improved today.  She denies any nausea or 

vomiting, she has had no bowel movements in the last 24 hours.  She has been 

afebrile, vital signs of stable. overall improving and unlikely to need surgery








Objective





- Vital Signs


Vital signs: 


                                   Vital Signs











Temp  98.4 F   07/16/22 07:52


 


Pulse  68   07/16/22 07:52


 


Resp  17   07/16/22 07:52


 


BP  98/66   07/16/22 07:52


 


Pulse Ox  97   07/16/22 07:52


 


FiO2      








                                 Intake & Output











 07/15/22 07/16/22 07/16/22





 18:59 06:59 18:59


 


Intake Total  0 


 


Balance  0 


 


Weight 85.275 kg 85.275 kg 85.275 kg


 


Intake:   


 


  Oral  0 














- Exam





General: non toxic, no distress, appears at stated age, normal weight


Derm: no unusual rashes/lesions, warm


Head: atraumatic, normocephalic


Eyes: EOMI, anicteric sclera, pupils equal round reactive to light


Neck: No cervical lymphadenopathy, trachea midline, supple


Mouth: no lip lesion, mucus membranes moist


Cardiovascular: S1S2 reg, no murmur, positive dorsalis pedis pulse bilateral, no

edema


Lungs: CTA bilateral, no rhonchi, no rales, no accessory muscle use


Abdominal: soft, left lower quadrant suprapubic tenderness to palpation with no 

guarding, hypoactive bowel sounds


Ext: muscle strength 5 out of 5 in all 4 extremities grossly, no gross muscle 

atrophy, no contractures, 


Neuro:  CN II-XI grossly intact, no gross focal 





- Labs


CBC & Chem 7: 


                                 07/16/22 06:33





                                 07/16/22 06:33


Labs: 


                  Abnormal Lab Results - Last 24 Hours (Table)











  07/15/22 07/15/22 07/15/22 Range/Units





  16:35 16:35 16:35 


 


WBC  13.1 H    (3.8-10.6)  k/uL


 


Hgb     (12.0-15.0)  g/dL


 


MCHC     (32.0-37.0)  g/dL


 


Immature Gran #     (0.00-0.04)  X 10*3/uL


 


Neutrophils #  10.2 H    (1.3-7.7)  k/uL


 


Monocytes #     (0.20-1.00)  X 10*3/uL


 


Sodium    135 L  (137-145)  mmol/L


 


BUN     (9.0-27.0)  mg/dL


 


BUN/Creatinine Ratio     (12.00-20.00)  Ratio


 


Glucose    106 H  (74-99)  mg/dL


 


AST    39 H  (14-36)  U/L


 


ALT    35 H  (4-34)  U/L


 


Procalcitonin     (0.02-0.09)  ng/mL


 


Ur Specific Gravity   1.044 H   (1.001-1.035)  


 


Urine Ketones   Trace H   (Negative)  














  07/15/22 07/16/22 07/16/22 Range/Units





  16:35 06:33 06:33 


 


WBC   11.35 H   (3.8-10.6)  k/uL


 


Hgb   11.6 L   (12.0-15.0)  g/dL


 


MCHC   31.0 L   (32.0-37.0)  g/dL


 


Immature Gran #   0.08 H   (0.00-0.04)  X 10*3/uL


 


Neutrophils #   8.57 H   (1.3-7.7)  k/uL


 


Monocytes #   1.29 H   (0.20-1.00)  X 10*3/uL


 


Sodium     (137-145)  mmol/L


 


BUN    7.0 L  (9.0-27.0)  mg/dL


 


BUN/Creatinine Ratio    10.00 L  (12.00-20.00)  Ratio


 


Glucose     (74-99)  mg/dL


 


AST     (14-36)  U/L


 


ALT     (4-34)  U/L


 


Procalcitonin  0.12 H    (0.02-0.09)  ng/mL


 


Ur Specific Gravity     (1.001-1.035)  


 


Urine Ketones     (Negative)  














Assessment and Plan


Assessment: 





# Sigmoid Diverticulitis with abscess


-improving


-Continue with IV antibiotics: Flagyl and Levaquin


-IV fluids


-Antiemetics


-advance diet as tolerated





# HLD


-continue Statin





DVT prophylaxis


-Heparin subcu





Anticipated discharge date: in 24-48 hrs


Anticipated discharge place: Home


Time with Patient: Less than 30

## 2022-07-17 RX ORDER — LEVOCETIRIZINE DIHYDROCHLORIDE 5 MG/1
TABLET, FILM COATED ORAL
Qty: 90 TABLET | Refills: 0 | Status: SHIPPED | OUTPATIENT
Start: 2022-07-17

## 2022-09-06 DIAGNOSIS — J01.01 ACUTE RECURRENT MAXILLARY SINUSITIS: ICD-10-CM

## 2022-09-06 RX ORDER — LEVOCETIRIZINE DIHYDROCHLORIDE 5 MG/1
5 TABLET, FILM COATED ORAL NIGHTLY
Qty: 90 TABLET | Refills: 1 | Status: SHIPPED | OUTPATIENT
Start: 2022-09-06

## 2022-09-07 NOTE — TELEPHONE ENCOUNTER
Refill passed per [address] protocol.   Requested Prescriptions   Pending Prescriptions Disp Refills    LEVOCETIRIZINE 5 MG Oral Tab [Pharmacy Med Name: Levocetirizine Dihydrochloride 5 Mg Tab Teva] 90 tablet 0     Sig: TAKE ONE TABLET BY MOUTH AT BEDTIME        Allergy Medication Protocol Passed - 9/6/2022  2:30 PM        Passed - In person appointment or virtual visit in the past 12 mos or appointment in next 3 mos       Recent Outpatient Visits              2 months ago Diet-controlled diabetes mellitus [facility])    [facility] Ophthalmology Jeannette Artis MD    Office Visit    3 months ago Annual physical exam    [address], Lola Gerardo MD    Office Visit    8 months ago Plantar fasciitis, bilateral    [facility], [address], [address], Heddy Bouquet, DPM    Office Visit    8 months ago Pain in both [address] [place] [address], [address], [address], Heddy Bouquet, [place]    Office Visit    10 months ago Primary hypertension    [address], [address], LeachTri MD    Office Visit                     Recent Outpatient Visits              2 months ago Diet-controlled diabetes mellitus [facility])    [facility] Ophthalmology Jeannette Artis MD    Office Visit    3 months ago Annual physical exam    [address], Lola Gerardo MD    Office Visit    8 months ago Plantar fasciitis, bilateral    [facility], [address], Shanelle Cameron, Kristian Bouquet, DPM    Office Visit    8 months ago Pain in both [address] [place] [address], [address], [address], Heddy Bouquet, [place]    Office Visit    10 months ago Primary hypertension    [address] [place], [address], [place], Tri Whitehead MD    Office Visit

## 2022-11-21 RX ORDER — OMEPRAZOLE 40 MG/1
40 CAPSULE, DELAYED RELEASE ORAL
Qty: 180 CAPSULE | Refills: 1 | Status: SHIPPED | OUTPATIENT
Start: 2022-11-21

## 2022-11-22 NOTE — TELEPHONE ENCOUNTER
Refill passed per [facility] protocol   Requested Prescriptions   Pending Prescriptions Disp Refills    OMEPRAZOLE 40 MG Oral Capsule Delayed Release [Pharmacy Med Name: Omeprazole Dr 40 Mg Cap Nort] 180 capsule 0     Sig: TAKE ONE CAPSULE BY MOUTH TWICE DAILY BEFORE MEALS       Gastrointestional Medication Protocol Passed - 11/21/2022 11:03 PM        Passed - In person appointment or virtual visit in the past 12 mos or appointment in next 3 mos     Recent Outpatient Visits              5 months ago Diet-controlled diabetes mellitus [facility])    [facility] Ophthalmology Kasi Zaldivar MD    Office Visit    6 months ago Annual physical exam    Charleen Galindo, Joaquin Sun MD    Office Visit    10 months ago Plantar fasciitis, bilateral    [facility], [address], [address] [place] [address]    Office Visit    11 months ago Pain in both [facility], [address], [address] [place] [address]    Office Visit    1 year ago Primary hypertension    [address], [address], Dara Llamas MD    Office Visit

## 2022-12-06 DIAGNOSIS — J01.01 ACUTE RECURRENT MAXILLARY SINUSITIS: ICD-10-CM

## 2022-12-06 RX ORDER — CARVEDILOL 12.5 MG/1
TABLET ORAL
Qty: 180 TABLET | Refills: 0 | Status: SHIPPED | OUTPATIENT
Start: 2022-12-06

## 2022-12-06 RX ORDER — LOSARTAN POTASSIUM AND HYDROCHLOROTHIAZIDE 25; 100 MG/1; MG/1
TABLET ORAL
Qty: 90 TABLET | Refills: 0 | Status: SHIPPED | OUTPATIENT
Start: 2022-12-06

## 2022-12-06 RX ORDER — MONTELUKAST SODIUM 10 MG/1
TABLET ORAL
Qty: 90 TABLET | Refills: 0 | Status: SHIPPED | OUTPATIENT
Start: 2022-12-06

## 2022-12-06 NOTE — TELEPHONE ENCOUNTER
Protocol failed or has No Protocol, please review  Requested Prescriptions   Pending Prescriptions Disp Refills    MONTELUKAST 10 MG Oral Tab [Pharmacy Med Name: Montelukast Sodium 10 Mg Tab Auro] 90 tablet 0     Sig: TAKE ONE TABLET BY MOUTH AT BEDTIME       Asthma & COPD Medication Protocol Failed - 12/6/2022  3:55 PM        Failed - In person appointment or virtual visit in the past 6 mos or appointment in next 3 mos     Recent Outpatient Visits              5 months ago Diet-controlled diabetes mellitus [facility])    [facility] Ophthalmology Hugo Amador MD    Office Visit    6 months ago Annual physical exam    [facility], Natasha Montana MD    Office Visit    11 months ago Plantar fasciitis, bilateral    [facility], [address], [address] [facility], Jaclyn Brown [facility]    Office Visit    11 months ago Pain in both [address] [place] [address], [address], [address] [facility], Jaclyn Brown [employer]    Office Visit    1 year ago Primary hypertension    [facility], [address], Sridhar Reyna MD    Office Visit                        LOSARTAN-HYDROCHLOROTHIAZIDE 100-25 MG Oral Tab [Pharmacy Med Name: Losartan Potassium/Hydrochlorothiazide 100-25 Mg Tab Jubi] 90 tablet 0     Sig: TAKE ONE TABLET BY MOUTH ONE TIME DAILY       Hypertensive Medications Protocol Failed - 12/6/2022  3:55 PM        Failed - CMP or BMP in past 6 months     No results found for this or any previous visit (from the past 4392 hour(s)).             Failed - In person appointment or virtual visit in the past 6 months     Recent Outpatient Visits              5 months ago Diet-controlled diabetes mellitus [facility])    [facility] Op[facility] Hugo Amador MD    Office Visit    6 months ago Annual physical exam    Miller Garrett MD    Office Visit    11 months ago Plantar fasciitis, bilateral [facility], [address], [facility] Cameron [facility]    Office Visit    11 months ago Pain in both [address] [place] [address], [address], [address] [place], [place]    Office Visit    1 year ago Primary hypertension    [facility], [facility], [address] [place],[address], [place], Luz Fowler MD    Office Visit                      Passed - In person appointment in the past 12 or next 3 months     Recent Outpatient Visits              5 months ago Diet-controlled diabetes mellitus [facility])    [facility] Ophthal[facility] Davide Okeefe MD    Office Visit    6 months ago Annual physical exam    Marchelle Lesch, Golda Ferraris, MD    Office Visit    11 months ago Plantar fasciitis, bilateral    [facility], [address], [address], [facility], DPM    Office Visit    11 months ago Pain in both [address] [place] [address], [address], [address] [facility], DPM    Office Visit    1 year ago Primary hypertension    [employer] [facility], [address] [place],[address], [place], Luz Fowler MD    Office Visit                      Passed - Last BP reading less than 140/90     BP Readings from Last 1 Encounters:  05/19/22 : 135/85              Passed - EGFRCR or GFRNAA > 50     GFR Evaluation  GFRNAA: 95 , resulted on 5/19/2022            CARVEDILOL 12.5 MG Oral Tab [Pharmacy Med Name: Carvedilol 12.5 Mg Tab Teva] 180 tablet 0     Sig: TAKE ONE TABLET BY MOUTH TWICE A DAY WITH MEALS       Hypertensive Medications Protocol Failed - 12/6/2022  3:55 PM        Failed - CMP or BMP in past 6 months     No results found for this or any previous visit (from the past 4392 hour(s)).             Failed - In person appointment or virtual visit in the past 6 months     Recent Outpatient Visits              5 months ago Diet-controlled diabetes mellitus [facility])    [facility] Ophthalmology Hardik tracey, Radha Buckner MD    Office Visit    6 months ago Annual physical exam    [address], Ronnie Oakse MD    Office Visit    11 months ago Plantar fasciitis, bilateral    [facility], [address], Niki Engel, DPM    Office Visit    11 months ago Pain in both [address] [place], [place] [address] [facility], [address], [address], Niki Silvan, [employer]    Office Visit    1 year ago Primary hypertension    [place] [employer] [facility], [address], [place], Mary Richardson MD    Office Visit                      Passed - In person appointment in the past 12 or next 3 months     Recent Outpatient Visits              5 months ago Diet-controlled diabetes mellitus [facility])    [facility] Ophthal[facility] Josh Raza MD    Office Visit    6 months ago Annual physical exam    [address], Ronnie Oakes MD    Office Visit    11 months ago Plantar fasciitis, bilateral    [facility], [address], [address], Niki Gerber, DPM    Office Visit    11 months ago Pain in both [address] [place] [address] [employer] [facility], [address], [address], Niki Silvan, [employer]    Office Visit    1 year ago Primary hypertension    [place] [employer] [facility], [address], [place], Mary Richardson MD    Office Visit                      Passed - Last BP reading less than 140/90     BP Readings from Last 1 Encounters:  05/19/22 : 135/85              Passed - EGFRCR or GFRNAA > 50     GFR Evaluation  GFRNAA: 95 , resulted on 5/19/2022               Recent Outpatient Visits              5 months ago Diet-controlled diabetes mellitus [facility])    [facility] Ophthal[facility] Josh Raza MD    Office Visit    6 months ago Annual physical exam    Erica Whitley MD    Office Visit    11 months ago Plantar fasciitis, bilateral    [facility], [address], Faith Engel DPM    Office Visit    11 months ago Pain in both [address] [facility], [address], [address] [place] [address], Faith Maravilla DPM    Office Visit    1 year ago Primary hypertension    [facility], [address], [place], Kemal Rice MD    Office Visit

## 2023-02-24 DIAGNOSIS — J01.01 ACUTE RECURRENT MAXILLARY SINUSITIS: ICD-10-CM

## 2023-02-27 RX ORDER — LOSARTAN POTASSIUM AND HYDROCHLOROTHIAZIDE 25; 100 MG/1; MG/1
1 TABLET ORAL DAILY
Qty: 90 TABLET | Refills: 1 | Status: SHIPPED | OUTPATIENT
Start: 2023-02-27

## 2023-02-27 RX ORDER — MONTELUKAST SODIUM 10 MG/1
TABLET ORAL
Qty: 90 TABLET | Refills: 1 | Status: SHIPPED | OUTPATIENT
Start: 2023-02-27

## 2023-02-27 RX ORDER — CARVEDILOL 12.5 MG/1
12.5 TABLET ORAL 2 TIMES DAILY WITH MEALS
Qty: 180 TABLET | Refills: 1 | Status: SHIPPED | OUTPATIENT
Start: 2023-02-27

## 2023-02-27 RX ORDER — MECLIZINE HYDROCHLORIDE 25 MG/1
TABLET ORAL
Qty: 20 TABLET | Refills: 0 | Status: SHIPPED | OUTPATIENT
Start: 2023-02-27

## 2023-02-27 NOTE — TELEPHONE ENCOUNTER
Please review; protocol failed/ no protocol  Medication pended for your review and approval.     Requested Prescriptions   Pending Prescriptions Disp Refills    MECLIZINE 25 MG Oral Tab [Pharmacy Med Name: Meclizine Hydrochloride 25 Mg Tab Zydu] 20 tablet 0     Sig: Take 1 tablet by mouth 3  times daily as needed. Gastrointestional Medication Protocol Passed - 2/24/2023 11:55 PM        Passed - In person appointment or virtual visit in the past 12 mos or appointment in next 3 mos     Recent Outpatient Visits              8 months ago Diet-controlled diabetes mellitus ([facility]    [address] [place], [address], Brandon Ochoa MD    Office Visit    9 months ago Annual physical exam    Sharon Eugene Austin, MD    Office Visit    1 year ago Plantar fasciitis, bilateral    Shanelle Eugene DPM    Office Visit    1 year ago Pain in both feet    Shanelle Eugene DPM    Office Visit    1 year ago Primary hypertension    [employer], [address] [facility] [address], [facility] Liz Curtis MD    Office Visit                        CARVEDILOL 12.5 MG Oral Tab [Pharmacy Med Name: Carvedilol 12.5 Mg Tab Teva] 180 tablet 0     Sig: TAKE ONE TABLET BY MOUTH TWICE DAILY WITH FOOD       Hypertensive Medications Protocol Failed - 2/24/2023 11:55 PM        Failed - CMP or BMP in past 6 months     No results found for this or any previous visit (from the past 4392 hour(s)).             Failed - In person appointment or virtual visit in the past 6 months     Recent Outpatient Visits              8 months ago Diet-controlled diabetes mellitus ([facility]    Brandon Eugene MD    Office Visit    9 months ago Annual physical exam    Sharon Eugene Austin, MD Office Visit    1 year ago Plantar fasciitis, bilateral    [facility], [address], Destiny Engel, WIL    Office Visit    1 year ago Pain in both feet    [facility], DepewDestiny Allen, DPM    Office Visit    1 year ago Primary hypertension    [facility], [address], [place]    Office Visit                      Passed - In person appointment in the past 12 or next 3 months     Recent Outpatient Visits              8 months ago Diet-controlled diabetes mellitus [facility])    Argelia Gupta, [address], Shanelle Foster MD    Office Visit    9 months ago Annual physical exam    [address] [place] [address] Osito Herrera MD    Office Visit    1 year ago Plantar fasciitis, bilateral    [facility], [address], Depewmagan Pastrana, DPM    Office Visit    1 year ago Pain in both feet    [address] [place] [address], DepewDestiny Allen, DPM    Office Visit    1 year ago Primary hypertension    [facility], [facility], [place], Minoo Millard MD    Office Visit                      Passed - Last BP reading less than 140/90     BP Readings from Last 1 Encounters:  05/19/22 : 135/85              Passed - EGFRCR or GFRNAA > 50     GFR Evaluation  GFRNAA: 95 , resulted on 5/19/2022            MONTELUKAST 10 MG Oral Tab [Pharmacy Med Name: Montelukast Sodium 10 Mg Tab Auro] 90 tablet 0     Sig: TAKE ONE TABLET BY MOUTH IN THE EVENING       Asthma & COPD Medication Protocol Failed - 2/24/2023 11:55 PM        Failed - In person appointment or virtual visit in the past 6 mos or appointment in next 3 mos     Recent Outpatient Visits              8 months ago Diet-controlled diabetes mellitus ([address]    [address] [place], [place] [address]Boone MD    Office Visit 9 months ago Annual physical exam    Aldo Dc MD    Office Visit    1 year ago Plantar fasciitis, bilateral    [facility], Jeanne Engel DPM    Office Visit    1 year ago Pain in both feet    [address] [place] [address], Jeanne Engel DPM    Office Visit    1 year ago Primary hypertension    Irma Cagle, [address] [facility] [facility], Balk[place] Ap Vazquez MD    Office Visit                        LOSARTAN-HYDROCHLOROTHIAZIDE 100-25 MG Oral Tab [Pharmacy Med Name: Losartan Potassium/Hydrochlorothiazide 100-25 Mg Tab [facility]] 90 tablet 0     Sig: TAKE ONE TABLET BY MOUTH ONE TIME DAILY       Hypertensive Medications Protocol Failed - 2/24/2023 11:55 PM        Failed - CMP or BMP in past 6 months     No results found for this or any previous visit (from the past 4392 hour(s)).             Failed - In person appointment or virtual visit in the past 6 months     Recent Outpatient Visits              8 months ago Diet-controlled diabetes mellitus ([address]    Irma Cagle, [address] [facility] [address] [facility], Rosamaria Almonte MD    Office Visit    9 months ago Annual physical exam    [address] [place], [place] [address]Osito MD    Office Visit    1 year ago Plantar fasciitis, bilateral    [facility], [address] [facility], Balkomagan Mauricio DPM    Office Visit    1 year ago Pain in both feet    Irma Cagle, [address] [facility] [facility], BalkoJeanne Shrestha DPM    Office Visit    1 year ago Primary hypertension    [facility], [address] [facility] [facility]    Office Visit                      Passed - In person appointment in the past 12 or next 3 months     Recent Outpatient Visits              8 months ago Diet-controlled diabetes mellitus ([address]    [address] [facility] Yoon Mcmullen MD    Office Visit    9 months ago Annual physical exam    Talisha Gentile, Jessa Vargas MD    Office Visit    1 year ago Plantar fasciitis, bilateral    [facility], [address], [place] Allen DPM    Office Visit    1 year ago Pain in both feet    Talisha Gentile, Barnevel[place]atoya Allen DPM    Office Visit    1 year ago Primary hypertension    [facility], [address] [facility] [address], [facility] John Brunson MD    Office Visit                      Passed - Last BP reading less than 140/90     BP Readings from Last 1 Encounters:  05/19/22 : 135/85              Passed - EGFRCR or GFRNAA > 50     GFR Evaluation  GFRNAA: 95 , resulted on 5/19/2022

## 2023-10-31 NOTE — TELEPHONE ENCOUNTER
[address] Maryana Keith, [address]  Tel.  [phone]    Fax. [address] [facility], [facility] Travis Peña  Tel.  [phone]    Fax. [phone]     [facility], [address] [facility]  Tel.  [phone]    Fax. [phone]       Jessica tSarr is a 35y.o. year old male referred by Dr. Rachel Ramirez for evaluation of a sleep disorder. ASSESSMENT/PLAN:     Diagnosis Orders   1. LEX (obstructive sleep apnea)  SLEEP STUDY UNATTENDED, 4 CHANNEL      2. Anxiety and depression        3. Chest pain in adult        4. BMI 25.0-25.9,adult            Patient has a history and examination consistent with the diagnosis of sleep apnea. Return for follow-up after testing is completed. * The patient currently has a Low Risk for having sleep apnea. STOP-BANG score 3.    * Sleep testing was ordered for initial evaluation. Orders Placed This Encounter   Procedures    SLEEP STUDY UNATTENDED, 4 CHANNEL     Standing Status:   Future     Standing Expiration Date:   10/31/2024       * He was provided information on sleep apnea including corresponding risk factors and the importance of proper treatment. * Treatment options were reviewed in detail. he would like to proceed with PAP therapy. Patient will be seen in follow-up in 6-8 weeks after PAP setup to gauge treatment response and adherence to therapy. * The patient was counseled regarding proper sleep hygiene, with emphasis on ensuring sufficient total sleep time; safe driving and the benefits of exercise and weight loss. * All of his questions were addressed. 2. Anxiety and Depression -  continue on current regimen, he will continue to monitor his mood and follow up with his care provider for reevaluation/adjustment of medications if warranted.   I have reviewed the relationship between mood as it relates to LMTCB

## 2024-05-10 ENCOUNTER — TELEPHONE (OUTPATIENT)
Dept: INTERNAL MEDICINE CLINIC | Facility: CLINIC | Age: 59
End: 2024-05-10

## 2024-05-10 DIAGNOSIS — Z00.00 ANNUAL PHYSICAL EXAM: Primary | ICD-10-CM

## 2024-05-10 NOTE — TELEPHONE ENCOUNTER
Patient is requesting annual lab orders placed prior to annual physical exam scheduled with provider on 5/20/24.   Patient is requesting a call when the orders are confirmed.    Please advise.

## 2024-05-20 ENCOUNTER — OFFICE VISIT (OUTPATIENT)
Dept: INTERNAL MEDICINE CLINIC | Facility: CLINIC | Age: 59
End: 2024-05-20

## 2024-05-20 VITALS
HEIGHT: 66 IN | HEART RATE: 56 BPM | SYSTOLIC BLOOD PRESSURE: 117 MMHG | TEMPERATURE: 98 F | DIASTOLIC BLOOD PRESSURE: 74 MMHG | OXYGEN SATURATION: 98 % | WEIGHT: 196 LBS | BODY MASS INDEX: 31.5 KG/M2

## 2024-05-20 DIAGNOSIS — J01.01 ACUTE RECURRENT MAXILLARY SINUSITIS: ICD-10-CM

## 2024-05-20 DIAGNOSIS — Z12.4 SCREENING FOR CERVICAL CANCER: ICD-10-CM

## 2024-05-20 DIAGNOSIS — Z00.00 ANNUAL PHYSICAL EXAM: Primary | ICD-10-CM

## 2024-05-20 DIAGNOSIS — Z78.0 MENOPAUSE: ICD-10-CM

## 2024-05-20 DIAGNOSIS — M17.0 PRIMARY OSTEOARTHRITIS OF BOTH KNEES: ICD-10-CM

## 2024-05-20 DIAGNOSIS — Z12.31 ENCOUNTER FOR SCREENING MAMMOGRAM FOR MALIGNANT NEOPLASM OF BREAST: ICD-10-CM

## 2024-05-20 DIAGNOSIS — I10 PRIMARY HYPERTENSION: ICD-10-CM

## 2024-05-20 PROCEDURE — 90677 PCV20 VACCINE IM: CPT | Performed by: INTERNAL MEDICINE

## 2024-05-20 PROCEDURE — 99396 PREV VISIT EST AGE 40-64: CPT | Performed by: INTERNAL MEDICINE

## 2024-05-20 PROCEDURE — 90471 IMMUNIZATION ADMIN: CPT | Performed by: INTERNAL MEDICINE

## 2024-05-20 RX ORDER — AZELASTINE 1 MG/ML
1 SPRAY, METERED NASAL 2 TIMES DAILY
Qty: 3 EACH | Refills: 1 | Status: SHIPPED | OUTPATIENT
Start: 2024-05-20

## 2024-05-20 NOTE — PROGRESS NOTES
HPI:   Lenin Rodriguez is a 58 year old female who presents for a complete physical exam.   She states that on and off she is having some swelling in the legs.  She is not exercising.  She states that she walks around the house      Wt Readings from Last 3 Encounters:   05/20/24 196 lb (88.9 kg)   05/19/22 186 lb (84.4 kg)   11/04/21 192 lb (87.1 kg)     Body mass index is 31.64 kg/m².     Cholesterol, Total (mg/dL)   Date Value   05/19/2022 191   12/02/2021 217 (H)   05/25/2021 230 (H)     HDL Cholesterol (mg/dL)   Date Value   05/19/2022 64 (H)   12/02/2021 66 (H)   05/25/2021 74 (H)     LDL Cholesterol (mg/dL)   Date Value   05/19/2022 109 (H)   12/02/2021 129 (H)   05/25/2021 137 (H)     AST (U/L)   Date Value   05/19/2022 11 (L)   12/02/2021 10 (L)   05/25/2021 13 (L)     ALT (U/L)   Date Value   05/19/2022 16   12/02/2021 16   05/25/2021 19        Current Outpatient Medications   Medication Sig Dispense Refill    MECLIZINE 25 MG Oral Tab Take 1 tablet by mouth 3  times daily as needed. 20 tablet 0    carvedilol 12.5 MG Oral Tab Take 1 tablet (12.5 mg total) by mouth 2 (two) times daily with meals. 180 tablet 1    montelukast 10 MG Oral Tab TAKE ONE TABLET BY MOUTH IN THE EVENING 90 tablet 1    losartan-hydroCHLOROthiazide 100-25 MG Oral Tab Take 1 tablet by mouth daily. 90 tablet 1    Omeprazole 40 MG Oral Capsule Delayed Release Take 1 capsule (40 mg total) by mouth 2 (two) times daily before meals. 180 capsule 1    levocetirizine 5 MG Oral Tab Take 1 tablet (5 mg total) by mouth nightly. 90 tablet 1    Meloxicam 15 MG Oral Tab TAKE 1/2 - 1 tablet BY MOUTH ONCE DAILY prn 30 tablet 0    Azelastine HCl 0.1 % Nasal Solution 1 spray by Nasal route 2 (two) times daily. 3 each 1    EPINEPHrine (EPIPEN 2-MARLENE) 0.3 MG/0.3ML Injection Solution Auto-injector Inject IM in event of  allergic reaction 1 each 0    Mometasone Furoate 0.1 % External Cream External application 2 times daily as directed. 45 g 0     Fluocinolone Acetonide Scalp 0.01 % External Oil External application as directed . (Patient not taking: Reported on 5/20/2024) 118 mL 2    ergocalciferol 1.25 MG (30806 UT) Oral Cap Take 1 capsule (50,000 Units total) by mouth once a week. (Patient not taking: Reported on 5/20/2024) 24 capsule 0    Estradiol (ESTRACE) 0.1 MG/GM Vaginal Cream Place 1 g vaginally 3 (three) times a week. (Patient not taking: Reported on 5/20/2024) 1 Tube 5    Multiple Vitamins-Minerals (MULTI-VITAMIN/MINERALS) Oral Tab Take 1 tablet by mouth daily. (Patient not taking: Reported on 5/20/2024)      Diclofenac Sodium 1 % Transdermal Gel APPLY TO THE AFFECTED AREA TWICE DAILY AS NEEDED 100 g 1    Cyanocobalamin (VITAMIN B-12) 1000 MCG Sublingual SL Tab Place under the tongue. take 1 Tablet by Sublingual route  every day (Patient not taking: Reported on 5/20/2024)        Past Medical History:    Anemia    Borderline diabetes    Chronic rhinitis    Dysuria    Esophageal reflux    High blood pressure    History of eye injury    OS, Eye injury from curtain kiki    Osteoarthritis    Palpitations    PMB (postmenopausal bleeding)    Routine physical examination      Past Surgical History:   Procedure Laterality Date    Colonoscopy      Colonoscopy      Egd      Saleem localization wire 1 site right (cpt=19281)      Needle biopsy left      Other  05/2021    hysteroscopic polypectomy    Other surgical history Bilateral     fine needle breast biopsy - benign    Stress test scan      Upper gi endoscopy,exam        Family History   Problem Relation Age of Onset    Hypertension Father     Lipids Father     Thyroid disease Father     Hypertension Mother     Lipids Mother     Thyroid disease Mother     Diabetes Maternal Grandmother     Diabetes Maternal Grandfather     Thyroid disease Sister     Thyroid disease Brother     Macular degeneration Neg     Glaucoma Neg       Social History:   Social History     Socioeconomic History    Marital status:     Tobacco Use    Smoking status: Never    Smokeless tobacco: Never   Vaping Use    Vaping status: Never Used   Substance and Sexual Activity    Alcohol use: No     Alcohol/week: 0.0 standard drinks of alcohol    Drug use: No   Other Topics Concern    Caffeine Concern Yes     Comment: 1 cup Coffee    Right Handed Yes     Exercise: none.  Diet: doesn't watch     REVIEW OF SYSTEMS:     Constitutional Negative Chills, fatigue and fever.   ENMT Negative Hearing loss, nasal drainage, pain in/around ear, sinus pressure and sore throat.   Eyes Negative Eye pain and vision changes.   Respiratory Negative Cough, dyspnea and wheezing.   Cardio Negative Chest pain, claudication, edema and irregular heartbeat/palpitations.   GI Negative Abdominal pain, blood in stool, constipation, diarrhea, heartburn, nausea and vomiting.    Negative Dysuria, hematuria, urinary incontinence. Menses regular, not heavy.   Endocrine Negative Cold intolerance and heat intolerance.   Neuro Negative Dizziness, extremity weakness, headache, memory impairment, numbness in extremities, seizures and tremors.   Psych Negative Anxiety, depression and insomnia.   Integumentary Negative Breast discharge, breast lump(s), hair loss and rash.   MS Negative Back pain and joint pain.   Hema/Lymph Negative Easy bleeding, easy bruising and thromboembolic events.   Allergic/Immuno Negative Environmental allergies, food allergies and seasonal allergies.         EXAM:   /74 (BP Location: Left arm, Patient Position: Sitting, Cuff Size: large)   Pulse 56   Temp 97.7 °F (36.5 °C) (Temporal)   Ht 5' 6\" (1.676 m)   Wt 196 lb (88.9 kg)   LMP 05/08/2015   SpO2 98%   BMI 31.64 kg/m²   Body mass index is 31.64 kg/m².     Constitutional Normal Well developed.   Eyes Normal Pupil - Right: Normal, Left: Normal.   Ears Normal External - normal. Canal. TM - Normal bilaterally  Hearing - grossly normal   Nasopharynx Normal External nose - Normal. Lips/teeth/gums -  Normal. Oropharynx - clear no erythema or exudate   Neck Exam Normal Palpation - Normal. No thyromegaly or lymphadenopathy   Breast Normal Inspection, palpation, nipples normal bilaterally. Self breast exam has been taught. Patient performs self breast exam.   Respiratory Normal Auscultation - Normal, no wheezes or rales   Cardiovascular Normal Regular rate and rhythm. No murmurs, gallops, or rubs   Vascular Normal Pulses - Dorsalis pedis: Normal. Bruits - Carotids: Absent.    Extremity Normal No edema. No varicosities.   Abdomen Normal Inspection normal, BS active. No abdominal tenderness or masses palpable   Musculoskeletal Normal Overview - Normal. No swelling or deformities.   Skin Normal Inspection - Normal.   Neurological Normal Memory - Normal. Sensory - Normal. Motor - Normal. Balance & gait - Normal.   Psychiatric Normal Orientation - Oriented to time, place, person & situation. Appropriate mood and affect.          ASSESSMENT AND PLAN:   Lenin Rodriguez is a 58 year old female who presents for a complete physical exam.  Self breast exam explained.   Health maintenance: Patient is due for blood work    Hypertension controlled continue with current medication    Diabetes type 2 not on insulin will check hemoglobin A1c watch diet avoid carbs follow-up with ophthalmology    Allergies continue with current medications    Knee osteoarthritis referral for Ortho    Screening for breast cancer mammogram  Pt' s weight is Body mass index is 31.64 kg/m²., recommended low fat diet and aerobic exercise 30 minutes three times weekly.  The patient indicates understanding of these issues and agrees to the plan.  The patient is asked to return for annual physical in 1 year.

## 2024-05-24 LAB — HPV I/H RISK 1 DNA SPEC QL NAA+PROBE: NEGATIVE

## 2024-06-15 DIAGNOSIS — J01.01 ACUTE RECURRENT MAXILLARY SINUSITIS: ICD-10-CM

## 2024-06-18 RX ORDER — LOSARTAN POTASSIUM AND HYDROCHLOROTHIAZIDE 25; 100 MG/1; MG/1
1 TABLET ORAL DAILY
Qty: 90 TABLET | Refills: 3 | Status: SHIPPED | OUTPATIENT
Start: 2024-06-18

## 2024-06-18 RX ORDER — CARVEDILOL 12.5 MG/1
12.5 TABLET ORAL 2 TIMES DAILY WITH MEALS
Qty: 90 TABLET | Refills: 3 | Status: SHIPPED | OUTPATIENT
Start: 2024-06-18

## 2024-06-18 RX ORDER — LEVOCETIRIZINE DIHYDROCHLORIDE 5 MG/1
5 TABLET, FILM COATED ORAL EVERY EVENING
Qty: 90 TABLET | Refills: 3 | Status: SHIPPED | OUTPATIENT
Start: 2024-06-18

## 2024-06-18 RX ORDER — OMEPRAZOLE 40 MG/1
40 CAPSULE, DELAYED RELEASE ORAL EVERY 12 HOURS
Qty: 90 CAPSULE | Refills: 3 | Status: SHIPPED | OUTPATIENT
Start: 2024-06-18

## 2024-06-18 NOTE — TELEPHONE ENCOUNTER
Please review. Protocol Failed; No Protocol    Requested Prescriptions   Pending Prescriptions Disp Refills    CARVEDILOL 12.5 MG Oral Tab [Pharmacy Med Name: Carvedilol 12.5 Mg Tab Zydu] 90 tablet 0     Sig: TAKE ONE TABLET BY MOUTH TWICE DAILY WITH MEALS       Hypertension Medications Protocol Failed - 6/15/2024  4:15 PM        Failed - CMP or BMP in past 12 months        Failed - EGFRCR or GFRNAA > 50     GFR Evaluation            Passed - Last BP reading less than 140/90     BP Readings from Last 1 Encounters:   05/20/24 117/74               Passed - In person appointment or virtual visit in the past 12 mos or appointment in next 3 mos     Recent Outpatient Visits              4 weeks ago Annual physical exam    [facility], Yani Knutson MD    Office Visit    2 years ago Diet-controlled diabetes mellitus (HCC)    [facility], Kenneth Peck MD    Office Visit    2 years ago Annual physical exam    [facility], Yani Knutson MD    Office Visit    2 years ago [facility], [facility]    [facility]    2 years ago [facility] both [facility]                        LOSARTAN-HYDROCHLOROTHIAZIDE 100-25 MG Oral Tab [Pharmacy Med Name: Losartan Potassium/Hydrochlorothiazide 100-25 Mg Tab Solc] 90 tablet 0     Sig: Take 1 tablet by mouth 1 time each day.       Hypertension Medications Protocol Failed - 6/15/2024  4:15 PM        Failed - CMP or BMP in past 12 months        Failed - EGFRCR or GFRNAA > 50     GFR Evaluation            Passed - Last BP reading less than 140/90     BP Readings from Last 1 Encounters:   05/20/24 117/74               Passed - In person appointment or virtual visit in the past 12 mos or  appointment in next 3 mos     Recent Outpatient Visits              4 weeks ago Annual physical exam    [facility], Yani Knutson MD    Office Visit    2 years ago Diet-controlled diabetes mellitus (HCC)    [facility], Kenneth Peck MD    Office Visit    2 years ago Annual physical exam    [facility], Yani Knutson MD    Office Visit    2 years ago Plantar fasciitis, bilateral    [facility], Tre Engel DPM    Office Visit    2 years ago Pain in both feet    [facility], Tre Engel DPM    Office Visit                       Signed Prescriptions Disp Refills    levocetirizine 5 MG Oral Tab 90 tablet 3     Sig: TAKE 1 TABLET BY MOUTH ONCE DAILY IN THE EVENING       Allergy Medication Protocol Passed - 6/15/2024  4:15 PM        Passed - In person appointment or virtual visit in the past 12 mos or appointment in next 3 mos     Recent Outpatient Visits              4 weeks ago Annual physical exam    [facility], Yani Knutson MD    Office Visit    2 years ago Diet-controlled diabetes mellitus (HCC)    [facility], Kenneth Peck MD    Office Visit    2 years ago Annual physical exam    [facility], Yani Knutson MD    Office Visit    2 years ago Plantar fasciitis, bilateral    [facility]    [facility]    2 years ago Pain in both feet    [facility]    [facility]                        Omeprazole 40 MG Oral Capsule Delayed Release 90 capsule 3     Sig: TAKE ONE CAPSULE BY MOUTH EVERY 12 HOURS       Gastrointestional Medication  Protocol Passed - 6/15/2024  4:15 PM        Passed - In person appointment or virtual visit in the past 12 mos or appointment in next 3 mos     Recent Outpatient Visits              4 weeks ago Annual physical exam    [facility], Yani Knutson MD    Office Visit    2 years ago Diet-controlled diabetes mellitus (HCC)    [facility], Kenneth Peck MD    Office Visit    2 years ago Annual physical exam    [facility], Yani Knutson MD    Office Visit    2 years ago Plantar fasciitis, bilateral    [facility], Tre Engel DPM    Office Visit    2 years ago Pain in both feet    [facility], Tre Engel DPM    Office Visit                               Recent Outpatient Visits              4 weeks ago Annual physical exam    [facility], Yani Knutson MD    Office Visit    2 years ago Diet-controlled diabetes mellitus (HCC)    [facility], Kenneth Peck MD    Office Visit    2 years ago Annual physical exam    [facility], Yani Knutson MD    Office Visit    2 years ago Plantar fasciitis, bilateral    [facility], Tre Engel DPM    Office Visit    2 years ago Pain in both feet    [facility], Tre Engel DPM    Office Visit

## 2024-07-23 ENCOUNTER — TELEPHONE (OUTPATIENT)
Dept: CASE MANAGEMENT | Age: 59
End: 2024-07-23

## 2024-07-23 DIAGNOSIS — M25.561 PAIN IN BOTH KNEES, UNSPECIFIED CHRONICITY: Primary | ICD-10-CM

## 2024-07-23 DIAGNOSIS — M25.562 PAIN IN BOTH KNEES, UNSPECIFIED CHRONICITY: Primary | ICD-10-CM

## 2024-07-23 DIAGNOSIS — M25.551 RIGHT HIP PAIN: ICD-10-CM

## 2024-07-23 DIAGNOSIS — M17.0 PRIMARY OSTEOARTHRITIS OF BOTH KNEES: ICD-10-CM

## 2024-07-23 NOTE — TELEPHONE ENCOUNTER
Dr. Man,     Patient is requesting referral to Dr. Cruz, current referral does not include hip pain.     Pended referral please review diagnosis and sign off if you agree.    Thank you.  Corie Barger  [facility]

## 2024-07-29 ENCOUNTER — HOSPITAL ENCOUNTER (OUTPATIENT)
Dept: BONE DENSITY | Facility: HOSPITAL | Age: 59
Discharge: HOME | End: 2024-07-29
Attending: INTERNAL MEDICINE
Payer: COMMERCIAL

## 2024-07-29 ENCOUNTER — LAB ENCOUNTER (OUTPATIENT)
Dept: LAB | Facility: HOSPITAL | Age: 59
End: 2024-07-29
Attending: INTERNAL MEDICINE
Payer: COMMERCIAL

## 2024-07-29 DIAGNOSIS — Z78.0 MENOPAUSE: ICD-10-CM

## 2024-07-29 DIAGNOSIS — Z00.00 ANNUAL PHYSICAL EXAM: ICD-10-CM

## 2024-07-29 LAB
ALBUMIN SERPL-MCNC: 4.4 G/DL (ref 3.2–4.8)
ALBUMIN/GLOB SERPL: 1.2 {RATIO} (ref 1–2)
ALP LIVER SERPL-CCNC: 72 U/L
ALT SERPL-CCNC: 9 U/L
ANION GAP SERPL CALC-SCNC: 5 MMOL/L (ref 0–18)
AST SERPL-CCNC: 12 U/L (ref ?–34)
BASOPHILS # BLD AUTO: 0.04 X10(3) UL (ref 0–0.2)
BASOPHILS NFR BLD AUTO: 0.6 %
BILIRUB SERPL-MCNC: 0.5 MG/DL (ref 0.3–1.2)
BUN BLD-MCNC: 14 MG/DL (ref 9–23)
BUN/CREAT SERPL: 18.2 (ref 10–20)
CALCIUM BLD-MCNC: 9.8 MG/DL (ref 8.7–10.4)
CHLORIDE SERPL-SCNC: 104 MMOL/L (ref 98–112)
CHOLEST SERPL-MCNC: 197 MG/DL (ref ?–200)
CO2 SERPL-SCNC: 32 MMOL/L (ref 21–32)
CREAT BLD-MCNC: 0.77 MG/DL
CREAT UR-SCNC: 45.4 MG/DL
DEPRECATED RDW RBC AUTO: 47.9 FL (ref 35.1–46.3)
EGFRCR SERPLBLD CKD-EPI 2021: 89 ML/MIN/1.73M2 (ref 60–?)
EOSINOPHIL # BLD AUTO: 0.21 X10(3) UL (ref 0–0.7)
EOSINOPHIL NFR BLD AUTO: 3 %
ERYTHROCYTE [DISTWIDTH] IN BLOOD BY AUTOMATED COUNT: 14.9 % (ref 11–15)
EST. AVERAGE GLUCOSE BLD GHB EST-MCNC: 151 MG/DL (ref 68–126)
FASTING PATIENT LIPID ANSWER: YES
FASTING STATUS PATIENT QL REPORTED: YES
GLOBULIN PLAS-MCNC: 3.6 G/DL (ref 2–3.5)
GLUCOSE BLD-MCNC: 132 MG/DL (ref 70–99)
HBA1C MFR BLD: 6.9 % (ref ?–5.7)
HCT VFR BLD AUTO: 34.5 %
HDLC SERPL-MCNC: 63 MG/DL (ref 40–59)
HGB BLD-MCNC: 11.3 G/DL
IMM GRANULOCYTES # BLD AUTO: 0.02 X10(3) UL (ref 0–1)
IMM GRANULOCYTES NFR BLD: 0.3 %
LDLC SERPL CALC-MCNC: 114 MG/DL (ref ?–100)
LYMPHOCYTES # BLD AUTO: 2.65 X10(3) UL (ref 1–4)
LYMPHOCYTES NFR BLD AUTO: 37.7 %
MCH RBC QN AUTO: 28.4 PG (ref 26–34)
MCHC RBC AUTO-ENTMCNC: 32.8 G/DL (ref 31–37)
MCV RBC AUTO: 86.7 FL
MICROALBUMIN UR-MCNC: <0.3 MG/DL
MONOCYTES # BLD AUTO: 0.43 X10(3) UL (ref 0.1–1)
MONOCYTES NFR BLD AUTO: 6.1 %
NEUTROPHILS # BLD AUTO: 3.68 X10 (3) UL (ref 1.5–7.7)
NEUTROPHILS # BLD AUTO: 3.68 X10(3) UL (ref 1.5–7.7)
NEUTROPHILS NFR BLD AUTO: 52.3 %
NONHDLC SERPL-MCNC: 134 MG/DL (ref ?–130)
OSMOLALITY SERPL CALC.SUM OF ELEC: 294 MOSM/KG (ref 275–295)
PLATELET # BLD AUTO: 274 10(3)UL (ref 150–450)
POTASSIUM SERPL-SCNC: 4.3 MMOL/L (ref 3.5–5.1)
PROT SERPL-MCNC: 8 G/DL (ref 5.7–8.2)
RBC # BLD AUTO: 3.98 X10(6)UL
SODIUM SERPL-SCNC: 141 MMOL/L (ref 136–145)
TRIGL SERPL-MCNC: 111 MG/DL (ref 30–149)
TSI SER-ACNC: 3.33 MIU/ML (ref 0.55–4.78)
VLDLC SERPL CALC-MCNC: 19 MG/DL (ref 0–30)
WBC # BLD AUTO: 7 X10(3) UL (ref 4–11)

## 2024-07-29 PROCEDURE — 80053 COMPREHEN METABOLIC PANEL: CPT

## 2024-07-29 PROCEDURE — 77080 DXA BONE DENSITY AXIAL: CPT | Performed by: INTERNAL MEDICINE

## 2024-07-29 PROCEDURE — 36415 COLL VENOUS BLD VENIPUNCTURE: CPT

## 2024-07-29 PROCEDURE — 80061 LIPID PANEL: CPT

## 2024-07-29 PROCEDURE — 84443 ASSAY THYROID STIM HORMONE: CPT

## 2024-07-29 PROCEDURE — 85025 COMPLETE CBC W/AUTO DIFF WBC: CPT

## 2024-07-29 PROCEDURE — 82043 UR ALBUMIN QUANTITATIVE: CPT

## 2024-07-29 PROCEDURE — 82570 ASSAY OF URINE CREATININE: CPT

## 2024-07-29 PROCEDURE — 83036 HEMOGLOBIN GLYCOSYLATED A1C: CPT

## 2024-08-01 NOTE — PROGRESS NOTES
CBC Normal (white blood cells and  anemia is presentplatelets), anemia is present.    Urine shows no protein spillage from the affected diabetes.  CMP Normal (electrolytes, kidney and liver functions),   Lipid (choilesterol) is worse.  Goal LDL should be less than 70.Careful with diet.  Avoid red meat, shellfish, pork.  Try not to manrique foods.  Lower carb diet.  Exercise is most part at least 30 minutes 3-4 times a week sustained cardiovascular aerobic exercise getting that heart rate going and keeping it going for 30 minutes at a time.  If cannot do 30 will start with 10 minutes of brisk walking is good at each week build up 5 minutes more.  Recheck lipid in 3 months.  Would recommend low-dose statin 10 mg at night if okay can send to the pharmacy.  Recheck lipid in 3 months.  Thyroid is good.   Hemoglobin A1c which is a 3-month average of sugars is worse than prior.  Goal is less than 6.5.Careful with diet and excercise at least 30 minutes 3-4 times a week. Check sugars at different times on different dates. Careful with low sugars. Carry something with you and check sugar if can. Can carry sowmya cracker, etc. Decrease carbohydrates. But also, careful with fruits and natural sugars.One serving a day and no more than 1 handful every day. Check feet  every AM and careful with sores and ulcers on feet bilaterally. Check eyes every year with dilated eye exam.  Check sugars.  2-hour postmeal should be less than 140s.  Pre-meal should be 's.  Both equally affected A1c.  Would recommend following up with PCP regarding the diabetes.

## 2024-08-12 ENCOUNTER — OFFICE VISIT (OUTPATIENT)
Dept: ORTHOPEDICS CLINIC | Facility: CLINIC | Age: 59
End: 2024-08-12
Payer: COMMERCIAL

## 2024-08-12 ENCOUNTER — HOSPITAL ENCOUNTER (OUTPATIENT)
Dept: GENERAL RADIOLOGY | Facility: HOSPITAL | Age: 59
Discharge: HOME | End: 2024-08-12
Attending: ORTHOPAEDIC SURGERY
Payer: COMMERCIAL

## 2024-08-12 ENCOUNTER — OFFICE VISIT (OUTPATIENT)
Dept: INTERNAL MEDICINE CLINIC | Facility: CLINIC | Age: 59
End: 2024-08-12

## 2024-08-12 VITALS
DIASTOLIC BLOOD PRESSURE: 75 MMHG | BODY MASS INDEX: 34.37 KG/M2 | HEIGHT: 63 IN | WEIGHT: 194 LBS | SYSTOLIC BLOOD PRESSURE: 123 MMHG | OXYGEN SATURATION: 99 % | HEART RATE: 76 BPM | TEMPERATURE: 98 F

## 2024-08-12 VITALS
HEART RATE: 57 BPM | HEIGHT: 63 IN | DIASTOLIC BLOOD PRESSURE: 68 MMHG | SYSTOLIC BLOOD PRESSURE: 128 MMHG | WEIGHT: 185 LBS | BODY MASS INDEX: 32.78 KG/M2

## 2024-08-12 DIAGNOSIS — M25.562 CHRONIC PAIN OF BOTH KNEES: ICD-10-CM

## 2024-08-12 DIAGNOSIS — G89.29 CHRONIC PAIN OF BOTH KNEES: Primary | ICD-10-CM

## 2024-08-12 DIAGNOSIS — M25.561 CHRONIC PAIN OF BOTH KNEES: Primary | ICD-10-CM

## 2024-08-12 DIAGNOSIS — E11.00 TYPE 2 DIABETES MELLITUS WITH HYPEROSMOLARITY WITHOUT COMA, WITHOUT LONG-TERM CURRENT USE OF INSULIN (HCC): Primary | ICD-10-CM

## 2024-08-12 DIAGNOSIS — M25.562 CHRONIC PAIN OF BOTH KNEES: Primary | ICD-10-CM

## 2024-08-12 DIAGNOSIS — G89.29 CHRONIC PAIN OF BOTH KNEES: ICD-10-CM

## 2024-08-12 DIAGNOSIS — M25.561 CHRONIC PAIN OF BOTH KNEES: ICD-10-CM

## 2024-08-12 DIAGNOSIS — N89.8 BLOODY VAGINAL DISCHARGE: ICD-10-CM

## 2024-08-12 DIAGNOSIS — M17.0 PRIMARY OSTEOARTHRITIS OF BOTH KNEES: ICD-10-CM

## 2024-08-12 PROCEDURE — 99214 OFFICE O/P EST MOD 30 MIN: CPT | Performed by: INTERNAL MEDICINE

## 2024-08-12 PROCEDURE — 73562 X-RAY EXAM OF KNEE 3: CPT | Performed by: ORTHOPAEDIC SURGERY

## 2024-08-12 RX ORDER — MELOXICAM 15 MG/1
15 TABLET ORAL DAILY
Qty: 30 TABLET | Refills: 0 | Status: SHIPPED | OUTPATIENT
Start: 2024-08-12

## 2024-08-12 RX ORDER — OMEPRAZOLE 40 MG/1
40 CAPSULE, DELAYED RELEASE ORAL EVERY 12 HOURS
Qty: 180 CAPSULE | Refills: 3 | Status: SHIPPED | OUTPATIENT
Start: 2024-08-12

## 2024-08-12 RX ORDER — TRIAMCINOLONE ACETONIDE 40 MG/ML
40 INJECTION, SUSPENSION INTRA-ARTICULAR; INTRAMUSCULAR ONCE
Status: COMPLETED | OUTPATIENT
Start: 2024-08-12 | End: 2024-08-12

## 2024-08-12 RX ORDER — CARVEDILOL 12.5 MG/1
12.5 TABLET ORAL 2 TIMES DAILY WITH MEALS
Qty: 180 TABLET | Refills: 3 | Status: SHIPPED | OUTPATIENT
Start: 2024-08-12

## 2024-08-12 RX ADMIN — TRIAMCINOLONE ACETONIDE 40 MG: 40 INJECTION, SUSPENSION INTRA-ARTICULAR; INTRAMUSCULAR at 13:23:00

## 2024-08-12 NOTE — PROGRESS NOTES
NURSING INTAKE COMMENTS:   Chief Complaint   Patient presents with    Knee Pain     Consult bilateral Knee Pain 7/10 took Meloxicam last night. got an injection on  06/22/23 by Kirsten Jordan DO. Hx of OA. Pain returned 2 months ago.       HPI: This 58 year old female presents today with complaints of pain in both knees.  The pains in the anterolateral aspect of the knees.  She takes 7.5 mg of meloxicam as needed at night.  She has responded well to cortisone injections in the past.  Her last injection was more than a year ago.    Past Medical History:    Anemia    Borderline diabetes    Chronic rhinitis    Dysuria    Esophageal reflux    High blood pressure    History of eye injury    OS, Eye injury from curtain kiki    Osteoarthritis    Palpitations    PMB (postmenopausal bleeding)    Routine physical examination     Past Surgical History:   Procedure Laterality Date    Colonoscopy      Colonoscopy      Egd      Saleem localization wire 1 site right (cpt=19281)      Needle biopsy left      Other  05/2021    hysteroscopic polypectomy    Other surgical history Bilateral     fine needle breast biopsy - benign    Stress test scan      Upper gi endoscopy,exam       Current Outpatient Medications   Medication Sig Dispense Refill    Meloxicam 15 MG Oral Tab Take 1 tablet (15 mg total) by mouth daily. 30 tablet 0    carvedilol 12.5 MG Oral Tab Take 1 tablet (12.5 mg total) by mouth 2 (two) times daily with meals. 90 tablet 3    levocetirizine 5 MG Oral Tab TAKE 1 TABLET BY MOUTH ONCE DAILY IN THE EVENING 90 tablet 3    Omeprazole 40 MG Oral Capsule Delayed Release TAKE ONE CAPSULE BY MOUTH EVERY 12 HOURS 90 capsule 3    losartan-hydroCHLOROthiazide 100-25 MG Oral Tab Take 1 tablet by mouth daily. 90 tablet 3    azelastine 0.1 % Nasal Solution 1 spray by Nasal route 2 (two) times daily. 3 each 1    MECLIZINE 25 MG Oral Tab Take 1 tablet by mouth 3  times daily as needed. 20 tablet 0    montelukast 10 MG Oral Tab TAKE ONE  TABLET BY MOUTH IN THE EVENING 90 tablet 1    Meloxicam 15 MG Oral Tab TAKE 1/2 - 1 tablet BY MOUTH ONCE DAILY prn 30 tablet 0    Estradiol (ESTRACE) 0.1 MG/GM Vaginal Cream Place 1 g vaginally 3 (three) times a week. 1 Tube 5    Multiple Vitamins-Minerals (MULTI-VITAMIN/MINERALS) Oral Tab Take 1 tablet by mouth daily.      EPINEPHrine (EPIPEN 2-MARLENE) 0.3 MG/0.3ML Injection Solution Auto-injector Inject IM in event of  allergic reaction 1 each 0    Diclofenac Sodium 1 % Transdermal Gel APPLY TO THE AFFECTED AREA TWICE DAILY AS NEEDED 100 g 1    Mometasone Furoate 0.1 % External Cream External application 2 times daily as directed. 45 g 0     Allergies   Allergen Reactions    Sulfa Antibiotics RASH, SWELLING and SHORTNESS OF BREATH    Lisinopril Coughing    Rash Away  [Sensi-Care Protective Barrier] RASH    Seasonal OTHER (SEE COMMENTS)     Runny nose, itchy eyes, congestion     Shrimp ANAPHYLAXIS     Family History   Problem Relation Age of Onset    Hypertension Father     Lipids Father     Thyroid disease Father     Hypertension Mother     Lipids Mother     Thyroid disease Mother     Diabetes Maternal Grandmother     Diabetes Maternal Grandfather     Thyroid disease Sister     Thyroid disease Brother     Macular degeneration Neg     Glaucoma Neg        Social History     Occupational History    Not on file   Tobacco Use    Smoking status: Never    Smokeless tobacco: Never   Vaping Use    Vaping status: Never Used   Substance and Sexual Activity    Alcohol use: No     Alcohol/week: 0.0 standard drinks of alcohol    Drug use: No    Sexual activity: Not on file        Review of Systems:  GENERAL: feels generally well, no significant weight loss or weight gain  SKIN: no ulcerated or worrisome skin lesions  EYES:denies blurred vision or double vision  HEENT: denies new nasal congestion, sinus pain or ST  LUNGS: denies shortness of breath  CARDIOVASCULAR: denies chest pain  GI: no hematemesis, no worsening heartburn, no  diarrhea  : no dysuria, no blood in urine, no difficulty urinating, no incontinence  MUSCULOSKELETAL: no other musculoskeletal complaints other than in HPI  NEURO: no numbness or tingling, no weakness or balance disorder  PSYCHE: no depression or anxiety  HEMATOLOGIC: no hx of blood dyscrasia  ENDOCRINE: no thyroid or diabetes issues  ALL/ASTHMA: no new hx of severe allergy or asthma    Physical Examination:    Ht 5' 3\" (1.6 m)   Wt 185 lb (83.9 kg)   LMP 05/08/2015   BMI 32.77 kg/m²   Constitutional: appears well hydrated, alert and responsive, no acute distress noted  Extremities: No effusion in either knee.  Full range of motion.  No instability.  Normal hip range of motion.      Imaging: Mild to moderate degenerative changes of both knees.    XR DEXA BONE DENSITOMETRY (CPT=77080)    Result Date: 7/29/2024  PROCEDURE: XR DEXA BONE DENSITOMETRY (CPT=77080)  COMPARISON: [facility], XR DEXA BONE DENSITOMETRY (CPT=77080), 1/11/2019, 10:05 AM.  INDICATIONS: Z78.0 Menopause  TECHNIQUE: Measurement of bone mineral density of the lumbar spine and hip was performed on a Hologic dual energy x-ray absorptiometry scanner.  FINDINGS:   LEFT FEMORAL NECK  BMD: 0.985 gm/sq. cm. T SCORE: 1.2 Z SCORE: 2.5  LEFT TOTAL HIP  BMD: 1.189 gm/sq. cm. T SCORE: 2.0 Z SCORE: 3.0  PA LUMBAR SPINE (L1 - L4)  BMD: 1.142 gm/sq. cm. T SCORE: 0.9 Z SCORE: 2.3    T scores are a comparison to sex-matched patients with mean peak bone mass and are given in standard deviation (s.d.).  Each 1 s.d. corresponds to approximately 10% below peak normal bone density.   WORLD HEALTH ORGANIZATION CRITERIA NORMAL T SCORE: Above -1 s.d.  OSTEOPENIA T SCORE: Between -1 and -2.5 s.d.  OSTEOPOROSIS T SCORE: -2.5 s.d.  National Osteoporosis Foundation Clinician's Guide to Prevention and Treatment of Osteoporosis recommendations for treatment: Post menopausal women and men age 50 and older presenting with the following should be  considered for treatment: * A hip or vertebral (clinical or morphometric) fracture * T score < -2.5 at the femoral neck or spine after appropriate evaluation to exclude secondary causes. * Low bone mass (T score between -1.0 and -2.5 at the femoral neck or spine) and a 10-year probability of a hip fracture > 3% or a 10-year probability of a major osteoporosis-related fracture > 20% based on the [place]-adapted WHO algorithm         CONCLUSION:  1. Findings in the left femoral neck are in the normal range, and there is no increased fracture risk.  There has been decrease in the BMD by 5.1% from previous study.  Findings in the total left hip are in the normal range, there is no increased fracture risk.  There has been increase in the BMD by 0.1% from previous study.  The 10 year fracture risk for major osteoporotic fracture is 2.6%, and for hip fracture is less than 0.1%. 2. Findings in the total AP lumbar spine are in the normal range, and there is no increased fracture risk.  There has been decrease in the BMD by 3.4% from previous study.    Dictated by (CST): Neil Leonardo MD on 7/29/2024 at 6:11 PM     Finalized by (CST): Neil Leonardo MD on 7/29/2024 at 6:13 PM             Lab Results   Component Value Date    WBC 7.0 07/29/2024    HGB 11.3 (L) 07/29/2024    .0 07/29/2024      Lab Results   Component Value Date     (H) 07/29/2024    BUN 14 07/29/2024    CREATSERUM 0.77 07/29/2024    GFRNAA 95 05/19/2022    GFRAA 109 05/19/2022        Assessment and Plan:  Diagnoses and all orders for this visit:    Chronic pain of both knees  -     XR KNEE (3 VIEWS) AP LAT OBL BILAT (CPT=73562-50); Future    Primary osteoarthritis of both knees  -     PHYSICAL THERAPY - INTERNAL    Other orders  -     Meloxicam 15 MG Oral Tab; Take 1 tablet (15 mg total) by mouth daily.      Procedure: The risks and benefits of a cortisone injection were discussed with the patient.  An informational sheet was also provided and the  patient had ample time to review it.  Under sterile preparation, the right knee was injected with 40 mg of Kenalog and 4 cc 0.5% marcaine.  The patient tolerated the procedure well.    Assessment: She has osteoarthritis of both knees.  I prescribed 15 mg of meloxicam daily as well as physical therapy.  I injected the right knee with cortisone.    Plan: Follow-up as needed for left knee injection.  Consider Hyaluronate injections.    The above note was creating using Dragon speech recognition technology. Please excuse any typos.    ELKE BUI MD

## 2024-08-12 NOTE — PROGRESS NOTES
Per verbal order from Dr. Cruz, draw up and 4ml of 0.5% Marcaine and 1ml of Kenalog 40 for injection into right knee.Precious URIARTE MA  Patient provided education handout for cortisone injection.

## 2024-08-12 NOTE — PROGRESS NOTES
Subjective:     Patient ID: Lenin Rodriguez is a 58 year old female.    HPI    History/Other: Came in today for follow-up on her A1c results.  According to the patient she does not want to start any medication she says that she is going to try to be more compliant with her diet and exercise more since she got the steroid injection in her knees she feels like she can exercise more.  She needs referral for ophthalmology.  She also noticed lately that she does have some bloody tingling discharge.  She is menopausal.  No pain.  Review of Systems   Constitutional: Negative.    HENT: Negative.     Eyes: Negative.    Respiratory: Negative.     Cardiovascular: Negative.    Gastrointestinal: Negative.    Endocrine: Negative.    Genitourinary: Negative.    Musculoskeletal: Negative.    Skin: Negative.    Allergic/Immunologic: Negative.    Neurological: Negative.    Hematological: Negative.    Psychiatric/Behavioral: Negative.       Current Outpatient Medications   Medication Sig Dispense Refill    Meloxicam 15 MG Oral Tab Take 1 tablet (15 mg total) by mouth daily. 30 tablet 0    carvedilol 12.5 MG Oral Tab Take 1 tablet (12.5 mg total) by mouth 2 (two) times daily with meals. 180 tablet 3    Omeprazole 40 MG Oral Capsule Delayed Release Take 1 capsule (40 mg total) by mouth Q12H. 180 capsule 3    levocetirizine 5 MG Oral Tab TAKE 1 TABLET BY MOUTH ONCE DAILY IN THE EVENING 90 tablet 3    losartan-hydroCHLOROthiazide 100-25 MG Oral Tab Take 1 tablet by mouth daily. 90 tablet 3    azelastine 0.1 % Nasal Solution 1 spray by Nasal route 2 (two) times daily. 3 each 1    MECLIZINE 25 MG Oral Tab Take 1 tablet by mouth 3  times daily as needed. 20 tablet 0    montelukast 10 MG Oral Tab TAKE ONE TABLET BY MOUTH IN THE EVENING 90 tablet 1    Multiple Vitamins-Minerals (MULTI-VITAMIN/MINERALS) Oral Tab Take 1 tablet by mouth daily.      EPINEPHrine (EPIPEN 2-MARLENE) 0.3 MG/0.3ML Injection Solution Auto-injector Inject IM in event of   allergic reaction 1 each 0    Diclofenac Sodium 1 % Transdermal Gel APPLY TO THE AFFECTED AREA TWICE DAILY AS NEEDED 100 g 1    Mometasone Furoate 0.1 % External Cream External application 2 times daily as directed. 45 g 0    Meloxicam 15 MG Oral Tab TAKE 1/2 - 1 tablet BY MOUTH ONCE DAILY prn (Patient not taking: Reported on 8/12/2024) 30 tablet 0    Estradiol (ESTRACE) 0.1 MG/GM Vaginal Cream Place 1 g vaginally 3 (three) times a week. (Patient not taking: Reported on 8/12/2024) 1 Tube 5     Allergies:  Allergies   Allergen Reactions    Sulfa Antibiotics RASH, SWELLING and SHORTNESS OF BREATH    Lisinopril Coughing    Rash Away  [Sensi-Care Protective Barrier] RASH    Seasonal OTHER (SEE COMMENTS)     Runny nose, itchy eyes, congestion     Shrimp ANAPHYLAXIS       Past Medical History:    Anemia    Borderline diabetes    Chronic rhinitis    Dysuria    Esophageal reflux    High blood pressure    History of eye injury    OS, Eye injury from curtain kiki    Osteoarthritis    Palpitations    PMB (postmenopausal bleeding)    Routine physical examination      Past Surgical History:   Procedure Laterality Date    Colonoscopy      Colonoscopy      Egd      Saleem localization wire 1 site right (cpt=19281)      Needle biopsy left      Other  05/2021    hysteroscopic polypectomy    Other surgical history Bilateral     fine needle breast biopsy - benign    Stress test scan      Upper gi endoscopy,exam        Family History   Problem Relation Age of Onset    Hypertension Father     Lipids Father     Thyroid disease Father     Hypertension Mother     Lipids Mother     Thyroid disease Mother     Diabetes Maternal Grandmother     Diabetes Maternal Grandfather     Thyroid disease Sister     Thyroid disease Brother     Macular degeneration Neg     Glaucoma Neg       Social History:   Social History     Socioeconomic History    Marital status:    Tobacco Use    Smoking status: Never    Smokeless tobacco: Never   Vaping Use     Vaping status: Never Used   Substance and Sexual Activity    Alcohol use: No     Alcohol/week: 0.0 standard drinks of alcohol    Drug use: No   Other Topics Concern    Caffeine Concern Yes     Comment: 1 cup Coffee    Right Handed Yes        Objective:   Physical Exam  Vitals and nursing note reviewed.   Constitutional:       Appearance: Normal appearance.   HENT:      Head: Normocephalic and atraumatic.   Cardiovascular:      Rate and Rhythm: Normal rate and regular rhythm.      Pulses: Normal pulses.      Heart sounds: Normal heart sounds.   Pulmonary:      Effort: Pulmonary effort is normal.      Breath sounds: Normal breath sounds.   Abdominal:      Palpations: Abdomen is soft.   Musculoskeletal:         General: Normal range of motion.      Cervical back: Normal range of motion and neck supple.   Skin:     General: Skin is warm.   Neurological:      Mental Status: She is alert. Mental status is at baseline.   Psychiatric:         Mood and Affect: Mood normal.         Assessment & Plan:   1. Type 2 diabetes mellitus with hyperosmolarity without coma, without long-term current use of insulin (HCC) she is refusing medication, discussed about diet and exercise will repeat hemoglobin A1c in 3 months, referral for ophthalmology   2. Bloody vaginal discharge I will order pelvic ultrasound       Orders Placed This Encounter   Procedures    Hemoglobin A1C [E]       Meds This Visit:  Requested Prescriptions     Signed Prescriptions Disp Refills    carvedilol 12.5 MG Oral Tab 180 tablet 3     Sig: Take 1 tablet (12.5 mg total) by mouth 2 (two) times daily with meals.    Omeprazole 40 MG Oral Capsule Delayed Release 180 capsule 3     Sig: Take 1 capsule (40 mg total) by mouth Q12H.       Imaging & Referrals:  OPHTHALMOLOGY - INTERNAL  OBG - INTERNAL  US PELVIS (TRANSABDOMINAL AND TRANSVAGINAL) (CPT=76856/43882)

## 2024-08-21 ENCOUNTER — OFFICE VISIT (OUTPATIENT)
Dept: ORTHOPEDICS CLINIC | Facility: CLINIC | Age: 59
End: 2024-08-21

## 2024-08-21 VITALS — SYSTOLIC BLOOD PRESSURE: 119 MMHG | HEART RATE: 65 BPM | DIASTOLIC BLOOD PRESSURE: 57 MMHG

## 2024-08-21 DIAGNOSIS — M17.0 PRIMARY OSTEOARTHRITIS OF BOTH KNEES: Primary | ICD-10-CM

## 2024-08-21 PROCEDURE — 99213 OFFICE O/P EST LOW 20 MIN: CPT | Performed by: ORTHOPAEDIC SURGERY

## 2024-08-21 PROCEDURE — 20610 DRAIN/INJ JOINT/BURSA W/O US: CPT | Performed by: ORTHOPAEDIC SURGERY

## 2024-08-21 RX ORDER — AMOXICILLIN 500 MG/1
TABLET, FILM COATED ORAL
COMMUNITY
Start: 2024-08-20

## 2024-08-21 RX ORDER — TRIAMCINOLONE ACETONIDE 40 MG/ML
40 INJECTION, SUSPENSION INTRA-ARTICULAR; INTRAMUSCULAR ONCE
Status: COMPLETED | OUTPATIENT
Start: 2024-08-21 | End: 2024-08-21

## 2024-08-21 RX ADMIN — TRIAMCINOLONE ACETONIDE 40 MG: 40 INJECTION, SUSPENSION INTRA-ARTICULAR; INTRAMUSCULAR at 12:00:00

## 2024-08-21 NOTE — PROGRESS NOTES
Per verbal order from Dr. Cruz, draw up and 4ml of 0.5% Marcaine and 1ml of Kenalog 40 for injection into left knee.Marcelina Knutson  Patient provided education handout for cortisone injection.

## 2024-08-21 NOTE — PROGRESS NOTES
NURSING INTAKE COMMENTS:   Chief Complaint   Patient presents with    Knee Pain     Bilateral f/u - here for cortisone inj in the L knee - states the R is better since the injection - for the L pain is rated as 6-9/10 on and off        HPI: This 59 year old female presents today with complaints of left knee pain.  She noticed immediate improvement of her right knee symptoms following the right knee injection at her last visit.    Past Medical History:    Anemia    Borderline diabetes    Chronic rhinitis    Dysuria    Esophageal reflux    High blood pressure    History of eye injury    OS, Eye injury from curtain kiki    Osteoarthritis    Palpitations    PMB (postmenopausal bleeding)    Routine physical examination     Past Surgical History:   Procedure Laterality Date    Colonoscopy      Colonoscopy      Egd      Saleem localization wire 1 site right (cpt=19281)      Needle biopsy left      Other  05/2021    hysteroscopic polypectomy    Other surgical history Bilateral     fine needle breast biopsy - benign    Stress test scan      Upper gi endoscopy,exam       Current Outpatient Medications   Medication Sig Dispense Refill    amoxicillin 500 MG Oral Tab       Meloxicam 15 MG Oral Tab Take 1 tablet (15 mg total) by mouth daily. 30 tablet 0    carvedilol 12.5 MG Oral Tab Take 1 tablet (12.5 mg total) by mouth 2 (two) times daily with meals. 180 tablet 3    Omeprazole 40 MG Oral Capsule Delayed Release Take 1 capsule (40 mg total) by mouth Q12H. 180 capsule 3    levocetirizine 5 MG Oral Tab TAKE 1 TABLET BY MOUTH ONCE DAILY IN THE EVENING 90 tablet 3    losartan-hydroCHLOROthiazide 100-25 MG Oral Tab Take 1 tablet by mouth daily. 90 tablet 3    azelastine 0.1 % Nasal Solution 1 spray by Nasal route 2 (two) times daily. 3 each 1    MECLIZINE 25 MG Oral Tab Take 1 tablet by mouth 3  times daily as needed. 20 tablet 0    montelukast 10 MG Oral Tab TAKE ONE TABLET BY MOUTH IN THE EVENING 90 tablet 1    Meloxicam 15 MG Oral  Tab TAKE 1/2 - 1 tablet BY MOUTH ONCE DAILY prn (Patient not taking: Reported on 8/12/2024) 30 tablet 0    Estradiol (ESTRACE) 0.1 MG/GM Vaginal Cream Place 1 g vaginally 3 (three) times a week. (Patient not taking: Reported on 8/12/2024) 1 Tube 5    Multiple Vitamins-Minerals (MULTI-VITAMIN/MINERALS) Oral Tab Take 1 tablet by mouth daily.      EPINEPHrine (EPIPEN 2-MARLENE) 0.3 MG/0.3ML Injection Solution Auto-injector Inject IM in event of  allergic reaction 1 each 0    Diclofenac Sodium 1 % Transdermal Gel APPLY TO THE AFFECTED AREA TWICE DAILY AS NEEDED 100 g 1    Mometasone Furoate 0.1 % External Cream External application 2 times daily as directed. 45 g 0     Allergies   Allergen Reactions    Sulfa Antibiotics RASH, SWELLING and SHORTNESS OF BREATH    Lisinopril Coughing    Rash Away  [Sensi-Care Protective Barrier] RASH    Seasonal OTHER (SEE COMMENTS)     Runny nose, itchy eyes, congestion     Shrimp ANAPHYLAXIS     Family History   Problem Relation Age of Onset    Hypertension Father     Lipids Father     Thyroid disease Father     Hypertension Mother     Lipids Mother     Thyroid disease Mother     Diabetes Maternal Grandmother     Diabetes Maternal Grandfather     Thyroid disease Sister     Thyroid disease Brother     Macular degeneration Neg     Glaucoma Neg        Social History     Occupational History    Not on file   Tobacco Use    Smoking status: Never    Smokeless tobacco: Never   Vaping Use    Vaping status: Never Used   Substance and Sexual Activity    Alcohol use: No     Alcohol/week: 0.0 standard drinks of alcohol    Drug use: No    Sexual activity: Not on file        Review of Systems:  GENERAL: feels generally well, no significant weight loss or weight gain  SKIN: no ulcerated or worrisome skin lesions  EYES:denies blurred vision or double vision  HEENT: denies new nasal congestion, sinus pain or ST  LUNGS: denies shortness of breath  CARDIOVASCULAR: denies chest pain  GI: no hematemesis, no  worsening heartburn, no diarrhea  : no dysuria, no blood in urine, no difficulty urinating, no incontinence  MUSCULOSKELETAL: no other musculoskeletal complaints other than in HPI  NEURO: no numbness or tingling, no weakness or balance disorder  PSYCHE: no depression or anxiety  HEMATOLOGIC: no hx of blood dyscrasia  ENDOCRINE: no thyroid or diabetes issues  ALL/ASTHMA: no new hx of severe allergy or asthma    Physical Examination:    /57   Pulse 65   LMP 05/08/2015   Constitutional: appears well hydrated, alert and responsive, no acute distress noted  Extremities: Pain with range of motion of the left knee.  No effusion.      Imaging: XR KNEE (3 VIEWS) AP LAT OBL BILAT (DZJ=35121-91)    Result Date: 8/12/2024  PROCEDURE: XR KNEE (3 VIEWS) AP LAT OBL BILAT EM (CPT=73562)  COMPARISON: [facility] 2nd Floor, XR KNEE ORTHO SERIES BILAT EM (CPT=73656/65033), 11/06/2017, 10:02 AM.  INDICATIONS: chronic bilateral knee pain. no trauma  TECHNIQUE: 4 weight-bearing views were obtained.   FINDINGS:  BONES: Mild bilateral tricompartment osteoarthritis. No acute fracture or dislocation. SOFT TISSUES: Superficial varicosities lateral margin of the distal left thigh and proximal calf.. EFFUSION: None visible. OTHER: Negative.         CONCLUSION:  1. No acute fracture or dislocation. 2. Mild bilateral tricompartment osteoarthritis.    Dictated by (CST): Jacques Buenrostro MD on 8/12/2024 at 5:25 PM     Finalized by (CST): Jacques Buenrostro MD on 8/12/2024 at 5:26 PM          XR DEXA BONE DENSITOMETRY (CPT=77080)    Result Date: 7/29/2024  PROCEDURE: XR DEXA BONE DENSITOMETRY (CPT=77080)  COMPARISON: [facility], XR DEXA BONE DENSITOMETRY (CPT=77080), 1/11/2019, 10:05 AM.  INDICATIONS: Z78.0 Menopause  TECHNIQUE: Measurement of bone mineral density of the lumbar spine and hip was performed on a Hologic dual energy x-ray absorptiometry scanner.  FINDINGS:   LEFT FEMORAL  NECK  BMD: 0.985 gm/sq. cm. T SCORE: 1.2 Z SCORE: 2.5  LEFT TOTAL HIP  BMD: 1.189 gm/sq. cm. T SCORE: 2.0 Z SCORE: 3.0  PA LUMBAR SPINE (L1 - L4)  BMD: 1.142 gm/sq. cm. T SCORE: 0.9 Z SCORE: 2.3    T scores are a comparison to sex-matched patients with mean peak bone mass and are given in standard deviation (s.d.).  Each 1 s.d. corresponds to approximately 10% below peak normal bone density.   WORLD HEALTH ORGANIZATION CRITERIA NORMAL T SCORE: Above -1 s.d.  OSTEOPENIA T SCORE: Between -1 and -2.5 s.d.  OSTEOPOROSIS T SCORE: -2.5 s.d.  National Osteoporosis Foundation Clinician's Guide to Prevention and Treatment of Osteoporosis recommendations for treatment: Post menopausal women and men age 50 and older presenting with the following should be considered for treatment: * A hip or vertebral (clinical or morphometric) fracture * T score < -2.5 at the femoral neck or spine after appropriate evaluation to exclude secondary causes. * Low bone mass (T score between -1.0 and -2.5 at the femoral neck or spine) and a 10-year probability of a hip fracture > 3% or a 10-year probability of a major osteoporosis-related fracture > 20% based on the [place]-adapted WHO algorithm         CONCLUSION:  1. Findings in the left femoral neck are in the normal range, and there is no increased fracture risk.  There has been decrease in the BMD by 5.1% from previous study.  Findings in the total left hip are in the normal range, there is no increased fracture risk.  There has been increase in the BMD by 0.1% from previous study.  The 10 year fracture risk for major osteoporotic fracture is 2.6%, and for hip fracture is less than 0.1%. 2. Findings in the total AP lumbar spine are in the normal range, and there is no increased fracture risk.  There has been decrease in the BMD by 3.4% from previous study.    Dictated by (CST): Neil Leonardo MD on 7/29/2024 at 6:11 PM     Finalized by (CST): Neil Leonardo MD on 7/29/2024 at 6:13 PM              Lab Results   Component Value Date    WBC 7.0 07/29/2024    HGB 11.3 (L) 07/29/2024    .0 07/29/2024      Lab Results   Component Value Date     (H) 07/29/2024    BUN 14 07/29/2024    CREATSERUM 0.77 07/29/2024    GFRNAA 95 05/19/2022    GFRAA 109 05/19/2022        Assessment and Plan:  Diagnoses and all orders for this visit:    Primary osteoarthritis of both knees  -     arthrocentesis major joint  -     triamcinolone acetonide (Kenalog-40) 40 MG/ML injection 40 mg        Assessment: Osteoarthritis of the left knee.  Procedure: The risks and benefits of a cortisone injection were discussed with the patient.  An informational sheet was also provided and the patient had ample time to review it.  Under sterile preparation, the left knee was injected with 40 mg of Kenalog and 4 cc 0.5% marcaine.  The patient tolerated the procedure well.      Plan: Follow-up as needed    The above note was creating using Dragon speech recognition technology. Please excuse any typos.    ELKE BUI MD

## 2024-09-19 DIAGNOSIS — J01.01 ACUTE RECURRENT MAXILLARY SINUSITIS: ICD-10-CM

## 2024-09-20 ENCOUNTER — TELEPHONE (OUTPATIENT)
Dept: ADMINISTRATIVE | Facility: HOSPITAL | Age: 59
End: 2024-09-20

## 2024-09-20 ENCOUNTER — TELEPHONE (OUTPATIENT)
Dept: ORTHOPEDICS CLINIC | Facility: CLINIC | Age: 59
End: 2024-09-20

## 2024-09-20 NOTE — TELEPHONE ENCOUNTER
S/w patient- she states that she received cortisone injection into right knee on 8/12/24 and left knee cortisone in 8/21/24. She states that she originally got good relief in both knees, but she stated that over the last 8-10 days, she noticed increase pain and swelling in the right knee. She states that she was hoping for reassessment. I booked her for 3pm on 9/23/24. She accepted and had no other questions at this time

## 2024-09-20 NOTE — TELEPHONE ENCOUNTER
Patient states that she was given injection in both knee's during her last appointment, and is concerned about having a lot of pain in the right knee for the past week and it is consistently getting worse and there is swelling around the knee.

## 2024-09-20 NOTE — TELEPHONE ENCOUNTER
Good Morning, Please be advised that the  CARD ECHO 2D DOPPLER (CPT=93306) has been denied by the [facility] [employer] [facility].  Per AARON,, Dr. Man can call and initiate a P2P to be done to see if the denial can be overturned.  All the clinicals has been submitted.  Tracking #[id number] Tel#[phone].    Thanks,  Annie    Denial Reason:  Member  Name: SUPRIYA SANABRIA  Gender: Female  YOB: 1965  Member ID: [id number]  Health Plan: [id number] [employer] from [facility]  Spoken Language: ENGLISH  Written Language: ENGLISH  Referring Physician  Name: Yani Man  Address: [address] [place] [address]  Phone: [phone]  Tax ID: [id number]  UPIN:   Specialty: Internal Medicine  Rendering Provider  Name: [facility]  Address: [address] [place] [address]  Phone: [phone]  Tax ID: [id number]     Case  Case Description: Transthoracic Echocardiogram Request ID:  Tracking:  Not Available  [id number]  Request Date: 09/17/2024 11:00 AM Status: Disapproval  Entry Method: RadMD Validity Dates:  [Not Applicable]  ICD10: I10 Contact Name: QAMAR RAO  (Imaging Provider)  Initial Determination Date: 09/20/2024 04:01 AM    Final Determination Date: 09/20/2024 04:01 AM    Please be advised that all data was current as of Friday, September 20, 2024 at 8:24 AM [facility]  Radiology  Date of Service: 9/23/2024    Expedited: No  Extension: No  CPT4: 56394 Billable Codes  Clinical Rcvd: 9/17/2024 - Clinical information received via fax or upload        Denial Rationale  Your doctor’s request for a(n) Transthoracic Echocardiogram (pictures of inside your heart) has been denied.     [employer] Clinical Guideline 067 for Transthoracic Echocardiogram was used to make this decision.   This decision was based on the notes that were sent: body part is swollen (edema).   Before we can approve, we need the following notes: a copy of your most recent and doctor's notes about new or changing  signs; or exam findings to suggest heart valve or heart muscle problems. The additional information that is requested should clarify why this test is still needed. These were not given to us.   It is suggested that you follow up with your doctor for the next step in your care.  Medical Necessity Evaluation  Question Answer  Is this a request for an echocardiogram? This a request for an echocardiogram.  Please confirm the type of echocardiogram being requested. This is a request for a Transthoracic Echocardiogram.  Is the member 21 years old or younger? (system generated response) The member is 22 or older.  Which of the following best describes your reason for ordering this study? This study is being ordered for Pulmonary Hypertension.  Status History  Date Status  09/20/2024 04:01AM Your request was not approved. Please refer to the letter we sent you via email, fax or USPS for next steps.  09/19/2024 01:48PM This case cannot be approved based on clinical information received. Please upload additional clinical documents if available.  09/17/2024 12:34PM This request is being reviewed by a specialist physician  09/17/2024 12:16PM This request is being reviewed by our clinicians  09/17/2024 12:16PM Your clinical documentation has been received and is being reviewed by our clinical staff. You will be notifed of the determination.  09/17/2024 11:00AM Please fax or upload the clinical documentation requested in the fax or email we sent to you.  Documents Received  Date Received Name Size Method Actions  Documents Sent  Date Sent Pages Recipient Actions  Back to Search Criteria

## 2024-09-23 ENCOUNTER — HOSPITAL ENCOUNTER (OUTPATIENT)
Dept: MAMMOGRAPHY | Facility: HOSPITAL | Age: 59
Discharge: HOME | End: 2024-09-23
Attending: INTERNAL MEDICINE
Payer: COMMERCIAL

## 2024-09-23 ENCOUNTER — APPOINTMENT (OUTPATIENT)
Dept: CV DIAGNOSTICS | Facility: HOSPITAL | Age: 59
End: 2024-09-23
Attending: INTERNAL MEDICINE
Payer: COMMERCIAL

## 2024-09-23 ENCOUNTER — OFFICE VISIT (OUTPATIENT)
Dept: ORTHOPEDICS CLINIC | Facility: CLINIC | Age: 59
End: 2024-09-23

## 2024-09-23 DIAGNOSIS — M23.91 INTERNAL DERANGEMENT OF RIGHT KNEE: Primary | ICD-10-CM

## 2024-09-23 DIAGNOSIS — Z12.31 ENCOUNTER FOR SCREENING MAMMOGRAM FOR MALIGNANT NEOPLASM OF BREAST: ICD-10-CM

## 2024-09-23 PROCEDURE — 77067 SCR MAMMO BI INCL CAD: CPT | Performed by: INTERNAL MEDICINE

## 2024-09-23 PROCEDURE — 77063 BREAST TOMOSYNTHESIS BI: CPT | Performed by: INTERNAL MEDICINE

## 2024-09-23 NOTE — PROGRESS NOTES
NURSING INTAKE COMMENTS:   Chief Complaint   Patient presents with    Knee Pain     B/l knee f/u -  Pt was given cortisone injections last month. Pt states R knee is worse. States knee clicks and locks on and off. Rates pain 9/10. Pt states in back of L knee  it feels hard to the touch.        HPI: This 59 year old female presents today with complaints of bilateral knee pain.  Both knees were much improved following cortisone injections.  She is also been using meloxicam.  She has not done physical therapy yet.  She has new complaint, however, with her right knee which is intermittent locking.  Sometimes the knee will feel like it catches and she cannot move it.  This will relax, and recur again spontaneously.    Past Medical History:    Anemia    Borderline diabetes    Chronic rhinitis    Dysuria    Esophageal reflux    High blood pressure    History of eye injury    OS, Eye injury from curtain kiki    Osteoarthritis    Palpitations    PMB (postmenopausal bleeding)    Routine physical examination     Past Surgical History:   Procedure Laterality Date    Colonoscopy      Colonoscopy      Egd      Saleem localization wire 1 site right (cpt=19281)      Needle biopsy left      Other  05/2021    hysteroscopic polypectomy    Other surgical history Bilateral     fine needle breast biopsy - benign    Stress test scan      Upper gi endoscopy,exam       Current Outpatient Medications   Medication Sig Dispense Refill    amoxicillin 500 MG Oral Tab       Meloxicam 15 MG Oral Tab Take 1 tablet (15 mg total) by mouth daily. 30 tablet 0    carvedilol 12.5 MG Oral Tab Take 1 tablet (12.5 mg total) by mouth 2 (two) times daily with meals. 180 tablet 3    Omeprazole 40 MG Oral Capsule Delayed Release Take 1 capsule (40 mg total) by mouth Q12H. 180 capsule 3    levocetirizine 5 MG Oral Tab TAKE 1 TABLET BY MOUTH ONCE DAILY IN THE EVENING 90 tablet 3    losartan-hydroCHLOROthiazide 100-25 MG Oral Tab Take 1 tablet by mouth daily. 90  tablet 3    azelastine 0.1 % Nasal Solution 1 spray by Nasal route 2 (two) times daily. 3 each 1    MECLIZINE 25 MG Oral Tab Take 1 tablet by mouth 3  times daily as needed. 20 tablet 0    montelukast 10 MG Oral Tab TAKE ONE TABLET BY MOUTH IN THE EVENING 90 tablet 1    Meloxicam 15 MG Oral Tab TAKE 1/2 - 1 tablet BY MOUTH ONCE DAILY prn 30 tablet 0    Estradiol (ESTRACE) 0.1 MG/GM Vaginal Cream Place 1 g vaginally 3 (three) times a week. 1 Tube 5    Multiple Vitamins-Minerals (MULTI-VITAMIN/MINERALS) Oral Tab Take 1 tablet by mouth daily.      EPINEPHrine (EPIPEN 2-MARLENE) 0.3 MG/0.3ML Injection Solution Auto-injector Inject IM in event of  allergic reaction 1 each 0    Diclofenac Sodium 1 % Transdermal Gel APPLY TO THE AFFECTED AREA TWICE DAILY AS NEEDED 100 g 1    Mometasone Furoate 0.1 % External Cream External application 2 times daily as directed. 45 g 0     Allergies   Allergen Reactions    Sulfa Antibiotics RASH, SWELLING and SHORTNESS OF BREATH    Lisinopril Coughing    Rash Away  [Sensi-Care Protective Barrier] RASH    Seasonal OTHER (SEE COMMENTS)     Runny nose, itchy eyes, congestion     Shrimp ANAPHYLAXIS     Family History   Problem Relation Age of Onset    Hypertension Father     Lipids Father     Thyroid disease Father     Hypertension Mother     Lipids Mother     Thyroid disease Mother     Diabetes Maternal Grandmother     Diabetes Maternal Grandfather     Thyroid disease Sister     Thyroid disease Brother     Macular degeneration Neg     Glaucoma Neg        Social History     Occupational History    Not on file   Tobacco Use    Smoking status: Never    Smokeless tobacco: Never   Vaping Use    Vaping status: Never Used   Substance and Sexual Activity    Alcohol use: No     Alcohol/week: 0.0 standard drinks of alcohol    Drug use: No    Sexual activity: Not on file        Review of Systems:  GENERAL: feels generally well, no significant weight loss or weight gain  SKIN: no ulcerated or worrisome skin  lesions  EYES:denies blurred vision or double vision  HEENT: denies new nasal congestion, sinus pain or ST  LUNGS: denies shortness of breath  CARDIOVASCULAR: denies chest pain  GI: no hematemesis, no worsening heartburn, no diarrhea  : no dysuria, no blood in urine, no difficulty urinating, no incontinence  MUSCULOSKELETAL: no other musculoskeletal complaints other than in HPI  NEURO: no numbness or tingling, no weakness or balance disorder  PSYCHE: no depression or anxiety  HEMATOLOGIC: no hx of blood dyscrasia  ENDOCRINE: no thyroid or diabetes issues  ALL/ASTHMA: no new hx of severe allergy or asthma    Physical Examination:    [facility] 05/08/2015   Constitutional: appears well hydrated, alert and responsive, no acute distress noted  Extremities: No effusion in either knee.  Full range of motion.  Normal gait.  Questionable Antonio's test on the right.      Imaging: No results found.     Lab Results   Component Value Date    WBC 7.0 07/29/2024    HGB 11.3 (L) 07/29/2024    .0 07/29/2024      Lab Results   Component Value Date     (H) 07/29/2024    BUN 14 07/29/2024    CREATSERUM 0.77 07/29/2024    GFRNAA 95 05/19/2022    GFRAA 109 05/19/2022        Assessment and Plan:  Diagnoses and all orders for this visit:    Internal derangement of right knee        Assessment: She has symptoms of locking in the right knee that are suspicious of meniscal pathology.  She has not responded to conservative management including cortisone injection and meloxicam.    Plan: I ordered an MRI scan of the right knee, and I will follow-up with her after this been completed.    The above note was creating using Dragon speech recognition technology. Please excuse any typos.    ELKE BUI MD

## 2024-09-24 NOTE — TELEPHONE ENCOUNTER
Please Review. Protocol Failed; No Protocol     Requested Prescriptions   Pending Prescriptions Disp Refills    MONTELUKAST 10 MG Oral Tab [Pharmacy Med Name: Montelukast Sodium 10 Mg Tab Auro] 90 tablet 0     Sig: Take 1 tablet by mouth 1 time each day in the evening.       Asthma & COPD Medication Protocol Failed - 9/19/2024  2:29 PM        Failed - Asthma Action Score greater than or equal to 20        Failed - AAP/ACT given in last 12 months     No data recorded  No data recorded  No data recorded  No data recorded          Passed - Appointment in past 6 or next 3 months      Recent Outpatient Visits              [facility]    Office Visit    1 month ago Primary osteoarthritis of both knees    [facility]    Office Visit    1 month ago Type 2 diabetes mellitus with hyperosmolarity without coma, without long-term current use of insulin (HCC)    [facility]    Office Visit    1 month ago Chronic pain of both knees    [facility]    [facility] Visit    4 months ago Annual physical exam    [facility]    Office Visit          Future Appointments         Provider Department Appt Notes    In 2 days [facility] US RM6 PM [facility]     In 1 month Alex Cruz MD [facility]                            Future Appointments         Provider Department Appt Notes    In 2 days [facility] US RM6 PM [facility] Ultrasound [facility]     In 1 month Alex Cruz MD [facility]           Recent Outpatient Visits               Yesterday Internal derangement of right knee    [facility], Alex Trinidad MD    Office Visit    1 month ago Primary osteoarthritis of both knees    [facility]    Office Visit    1 month ago Type 2 diabetes mellitus with hyperosmolarity without coma, without long-term current use of insulin (HCC)    [facility]    Office Visit    1 month ago Chronic pain of both knees    [facility]    [facility] Visit    4 months ago Annual physical exam    [facility]    [facility] Visit

## 2024-09-25 RX ORDER — MONTELUKAST SODIUM 10 MG/1
TABLET ORAL
Qty: 90 TABLET | Refills: 3 | Status: SHIPPED | OUTPATIENT
Start: 2024-09-25

## 2024-10-08 ENCOUNTER — MED REC SCAN ONLY (OUTPATIENT)
Dept: INTERNAL MEDICINE CLINIC | Facility: CLINIC | Age: 59
End: 2024-10-08

## 2024-10-11 ENCOUNTER — TELEPHONE (OUTPATIENT)
Dept: CASE MANAGEMENT | Age: 59
End: 2024-10-11

## 2024-10-11 ENCOUNTER — TELEPHONE (OUTPATIENT)
Dept: ORTHOPEDICS CLINIC | Facility: CLINIC | Age: 59
End: 2024-10-11

## 2024-10-11 DIAGNOSIS — K21.9 GASTROESOPHAGEAL REFLUX DISEASE, UNSPECIFIED WHETHER ESOPHAGITIS PRESENT: Primary | ICD-10-CM

## 2024-10-11 DIAGNOSIS — E11.9 ENCOUNTER FOR DIABETIC FOOT EXAM (HCC): ICD-10-CM

## 2024-10-11 DIAGNOSIS — J39.2 IRRITATED THROAT: ICD-10-CM

## 2024-10-11 DIAGNOSIS — R05.9 COUGH, UNSPECIFIED TYPE: ICD-10-CM

## 2024-10-11 NOTE — TELEPHONE ENCOUNTER
Dr. Man,    Patient called requesting referral to [facility] Victoria, Dr. Braeden Hernandes and Dr. Deena Alexandre.      Pended referral please review diagnosis and sign off if you agree.    Thank you.  Corie Barger  [facility]

## 2024-10-11 NOTE — TELEPHONE ENCOUNTER
Patient states she was able to get MRI for this mery 10/13 and patient states she needs prior authorization for MRI and asking for it to be placed. Please advise.

## 2024-10-11 NOTE — TELEPHONE ENCOUNTER
Left detailed message for patient about moving MRI to early next week as there is very little chance if any that we can get this approved by Sunday as departments are closed. Advised I would send this message to the authorization department to be expedited, but it is unlikely to happen prior to an appointment on Sunday

## 2024-10-14 NOTE — TELEPHONE ENCOUNTER
Spoke with patient. She moved her MRI from Sunday to Wednesday.she gave me a phone number to insurance to try to expedite the process. [phone]. Ref# [id number].    Called this number, spoke with Adalberto ARZOLA, and started an authorization. It has been forwarded to clinical review. Case number [id number]. Clinical information faxed to [phone].

## 2024-10-15 ENCOUNTER — TELEPHONE (OUTPATIENT)
Facility: CLINIC | Age: 59
End: 2024-10-15

## 2024-10-15 NOTE — TELEPHONE ENCOUNTER
[facility]  [place] [address]               Salvador Rosales   2017  9:59 PM   ED    Description:  Male : 2017   Department:  [facility]           Your Care was Coordinated By:     Provider Role From To    Dominick Flores MD Attending Provider 17 --    Moises Vergara PA-C Physician Assistant 17 --      Reason for Visit     Nasal Congestion           Diagnoses this Visit        Comments    Viral URI with cough    -  Primary       ED Disposition     None           To Do List           Follow-up Information     Go to [facility].    Specialty:  Emergency Medicine    Why:  If symptoms worsen    Contact information:    [place] [address] [phone]        Follow up with Vel Leon MD. Schedule an appointment as soon as possible for a visit in 3 days.    Specialty:  Neonatology    Why:  For follow-up care    Contact information:    [address] [place] [address]  [place] [address] [phone]        [facility]     [facility] -  Assistance  Registered nurses in the [facility] provide clinical advisement, health education, appointment booking, and other advisory services.  Call for this free service at [phone].             Medications                Verify that the below list of medications is an accurate representation of the medications you are currently taking.  If none reported, the list may be blank. If incorrect, please contact your healthcare provider. Carry this list with you in case of emergency.                Clinical Reference Information           Your Vitals Were     Pulse Temp Resp Weight SpO2       141 99.8 °F (37.7 °C) (Rectal) 44 7.17 kg (15 lb 12.9 oz) 100%       Allergies as of 2017     No Known Allergies      Immunizations Administered on Date of Encounter - 2017     None      ED Micro,  Patient asking if authorization has been obtained. Please call thank you.    Lab, POCT     Start Ordered       Status Ordering Provider    02/12/17 2230 02/12/17 2229  Influenza antigen Nasopharyngeal Wash  STAT      Final result     02/12/17 2230 02/12/17 2229  RSV Antigen Detection Nasopharyngeal Wash  STAT      Final result       ED Imaging Orders     None        Discharge Instructions       You may continue to use bulb suction for nasal discharge as needed.  Tylenol if your child has a fever.  Please make appointment with pediatrician for follow-up care.    Discharge References/Attachments     URI, VIRAL, NO ABX (CHILD) (ENGLISH)       [facility] complies with applicable Federal civil rights laws and does not discriminate on the basis of race, color, national origin, age, disability, or sex.        Language Assistance Services     ATTENTION: Language assistance services are available, free of charge. Please call [phone].      ATENCIÓN: Si habla español, tiene a valentin disposición servicios gratuitos de asistencia lingüística. Llame al [phone].     CHÚ Ý: N?u b?n nói Ti?ng Vi?t, có các d?ch v? h? tr? ngôn ng? mi?n phí dành cho b?n. G?i s? [phone].

## 2024-10-15 NOTE — TELEPHONE ENCOUNTER
Called the number I called yesterday to start authorization.was prompted to enter the case number and the automated voice indicated that the case for RIGHT [facility] MRI was authorized.     Authorization number: [id number]  Good through 10/14/2025 or termination of patient coverage, whichever first.    Called patient and let her know she should be good to go, but that we were waiting for the prior auth department to update the referral. Since I sent communication yesterday, there has been nothing done with regards to updating referral.

## 2024-10-15 NOTE — TELEPHONE ENCOUNTER
Spoke to patient  Scheduled patient for consult appt - per Dr Victoria's request.    Location, date and time confirmed.    Your Appointments      Tuesday October 22, 2024 2:00 PM  Consult with Ashlee Victoria MD  [address] [address]  [phone]

## 2024-10-16 ENCOUNTER — HOSPITAL ENCOUNTER (OUTPATIENT)
Dept: MRI IMAGING | Facility: HOSPITAL | Age: 59
Discharge: HOME | End: 2024-10-16
Payer: COMMERCIAL

## 2024-10-16 DIAGNOSIS — M23.91 INTERNAL DERANGEMENT OF RIGHT KNEE: ICD-10-CM

## 2024-10-16 PROCEDURE — 73721 MRI JNT OF LWR EXTRE W/O DYE: CPT

## 2024-10-16 NOTE — TELEPHONE ENCOUNTER
Spoke with referrals/authorizations department and verified, per Annie, that patient is good to go for MRI this evening.    Called patient and gave her the (official) good news.

## 2024-10-18 ENCOUNTER — TELEPHONE (OUTPATIENT)
Dept: ORTHOPEDICS CLINIC | Facility: CLINIC | Age: 59
End: 2024-10-18

## 2024-10-18 NOTE — TELEPHONE ENCOUNTER
Pt called.  Received the mri results.  Appointment is scheduled with Dr. Cruz on 10-30-24. Pt asking to be seen sooner due to pain.  Please call pt

## 2024-10-21 NOTE — TELEPHONE ENCOUNTER
Patient calling back to check on status of new appointment. Patient also wants to know next steps/the results. Please call.

## 2024-10-22 ENCOUNTER — PATIENT MESSAGE (OUTPATIENT)
Dept: ORTHOPEDICS CLINIC | Facility: CLINIC | Age: 59
End: 2024-10-22

## 2024-10-22 ENCOUNTER — OFFICE VISIT (OUTPATIENT)
Dept: GASTROENTEROLOGY | Facility: CLINIC | Age: 59
End: 2024-10-22

## 2024-10-22 ENCOUNTER — TELEPHONE (OUTPATIENT)
Dept: GASTROENTEROLOGY | Facility: CLINIC | Age: 59
End: 2024-10-22

## 2024-10-22 VITALS
DIASTOLIC BLOOD PRESSURE: 90 MMHG | WEIGHT: 192 LBS | SYSTOLIC BLOOD PRESSURE: 160 MMHG | HEIGHT: 63 IN | BODY MASS INDEX: 34.02 KG/M2

## 2024-10-22 DIAGNOSIS — K21.9 GASTROESOPHAGEAL REFLUX DISEASE, UNSPECIFIED WHETHER ESOPHAGITIS PRESENT: Primary | ICD-10-CM

## 2024-10-22 DIAGNOSIS — F19.90 EXCESSIVE USE OF NONSTEROIDAL ANTI-INFLAMMATORY DRUG (NSAID): ICD-10-CM

## 2024-10-22 DIAGNOSIS — G89.29 CHRONIC RLQ PAIN: Primary | ICD-10-CM

## 2024-10-22 DIAGNOSIS — R49.0 HOARSENESS OF VOICE: ICD-10-CM

## 2024-10-22 DIAGNOSIS — R13.10 DYSPHAGIA, UNSPECIFIED TYPE: ICD-10-CM

## 2024-10-22 DIAGNOSIS — R10.31 CHRONIC RLQ PAIN: Primary | ICD-10-CM

## 2024-10-22 DIAGNOSIS — K21.9 GASTROESOPHAGEAL REFLUX DISEASE WITHOUT ESOPHAGITIS: ICD-10-CM

## 2024-10-22 PROCEDURE — 99204 OFFICE O/P NEW MOD 45 MIN: CPT | Performed by: INTERNAL MEDICINE

## 2024-10-22 NOTE — PATIENT INSTRUCTIONS
Schedule a CT scan of your abdomen/pelvis.     Continue to take omeprazole twice a day.     1. Schedule an upper endoscopy (EGD) with MAC [Diagnosis: uncontrolled GERD despite PPI, dysphagia]    2. Do not eat or drink after midnight the night before your procedure.     3. Medication adjustments: Continue ALL medications as usual.    4. If you start any NEW medication after your visit today, please notify us. Certain medications will need to be held before the procedure, or the procedure cannot be performed.     5. You will need a ride home from your procedure since you are receiving sedation. Please ensure you will have an available ride home or the procedure cannot be performed.

## 2024-10-22 NOTE — TELEPHONE ENCOUNTER
Scheduled for:  EGD [id number]  Provider Name:  Dr. Victoria  Date:  2/6/2025  Location:  [facility]  Sedation:  MAC  Time:  2:55 pm - Patient is aware he/she will receive a phone call the day before with the arrival time.  Prep:  NPO after midnight  Meds/Allergies Reconciled?:  Physician reviewed  Diagnosis with codes:  GERD K21.9; Dysphagia R13.10  Was patient informed to call insurance with codes (Y/N): Yes    Referral sent?:  Referral was sent at the time of electronic surgical scheduling.  EM or [facility] notified?:  I sent an electronic request to Endo Scheduling and received a confirmation today.    Medication Orders:  N/A  Misc Orders:  N/A     Further instructions given by staff:  Prep instructions were given to pt in the office, pt verbalized understanding.

## 2024-10-22 NOTE — H&P
[facility] - Gastroenterology                                                                                                               Reason for consult: acid reflux     Requesting physician or provider: JAYNE ARREOLA MD    Chief Complaint   Patient presents with    Consult     Severe acid reflux; phlegm increase; right sided discomfort when eating or having BM       HPI:   Lenin Rodriguez is a 59 year old woman with history of GERD, HTN, anemia presenting for evaluation of acid reflux.     Previously seen by Dr. Isabel 12/2020 for similar:  Lenin Rodriguez is a 55 year old  with history of acid reflux with sinus issues, throat pain, presenting for worsening reflux.     1. Gastroesophageal reflux disease with esophagitis, unspecified whether hemorrhage     2. Throat pain        Recommend:  Change to omeprazole BID  The pathophysiology of acid reflux was discussed. Discussed patient symptoms and triggers. Reviewed anti-reflux measures such as raising the head of the bed at night, avoiding tight clothing or belts, avoiding eating late at night and not lying down shortly after mealtime and achieving weight loss were discussed. Discussed patient should also avoid NSAID's, caffeine, peppermints, alcohol, tobacco and foods that incite symptoms   - Recommend continuing ASA if recommended by primary care.    - The patient was instructed to alert me if there are persistent symptoms, such as  dysphagia, weight loss or GI bleeding.   - EGD/Colon with MAC sedation and possible dilation and screening    EGD/Colonoscopy 2021  EGD findings:       1. Esophagus: The squamocolumnar junction was noted at 36 cm and appeared irregular with erythema and small erosion. The GE junction was noted at 36 cm from the incisors. Hiatus was at 40 cm so there was a 3-4 cm hiatal hernia. The esophageal mucosa appeared otherwise normal.   2.  Stomach: The stomach distended normally. Normal rugal folds were seen. The pylorus was patent. The gastric mucosa appeared erythematous so biopsies take for H pylori. There were multiple polyps, larger ones removed with cold snare and retrieved. Retroflexion revealed a normal fundus and a normal cardia.   3. Duodenum: The duodenal mucosa appeared normal in the 1st and 2nd portion of the duodenum.      Colonoscopy findings:     1. [place]: normal rectal tone, no masses palpated.   2. No polyp(s) noted  3. Terminal ileum: the visualized mucosa appeared normal.  4. The colonic mucosa throughout the colon showed normal vascular pattern, without evidence of angioectasias or inflammation. Given irregular bowels biopsies taken for microscopic colitis  5. Diverticulosis: none appreciated.  6. A retroflexed view of the rectum revealed hemorrhoids.     Recommend:  Await pathology. The interval for the next colonoscopy will be determined after reviewing pathology. If new signs or symptoms develop, colonoscopy may need to be repeated sooner.   High fiber diet.  Monitor for blood in the stool. If having more than just tinge of blood, call office or go to the ER.    Final Diagnosis:      A. Gastric; biopsy:   Mild chronic gastritis.  Diff-Quik stain (appropriate control reactivity) shows no definitive evidence of H. pylori-like microorganisms.  No evidence of dysplasia or carcinoma identified.     B. Gastric polyp:   Fragments of fundic gland polyp.  Separate minute fragments of squamous mucosa.     C. Distal esophagus; biopsy:   Fragments of squamous mucosa with features of reflux esophagitis.  Separate fragments of gastric glandular type mucosa with focal complete intestinal metaplasia (see comment).  No evidence of dysplasia or carcinoma identified.     D. Mid esophagus; biopsy:   Fragments of squamous mucosa with features of reflux esophagitis.  No evidence of eosinophilic esophagitis, dysplasia or carcinoma identified.     E.  Random colon; biopsy:   Fragments of colonic mucosa with preserved glandular architecture and rare lymphoid aggregates in the lamina propria.  No evidence of acute cryptitis, crypt abscess, lymphocytic colitis, dysplasia or carcinoma identified.     Comment:  Differential consideration for the finding of intestinal metaplasia in the distal esophageal biopsy (specimen C) include Mejia's esophagus vs intestinal metaplasia seen in association with chronic gastritis of gastric cardia. Clinical and endoscopic correlation is essential.     Today's visit:   She notes that she has had a hoarse voice for one month. She has a lot of phlegm and acid reflux. She feels like she can't eat due to reflux. She also notes RUQ pain. She notes that she has had this pain since she underwent colonoscopy in 2021. She believes this pain is from the biopsies taken in her colon. She has been strict about avoiding spicy foods and also avoids rice and bread. She feels acid refluxing up to her ears. She has been taking meloxicam for recent mensicus tear. Meloxicam use has coincided with worsening reflux. She notes retching attacks since starting meloxicam. She will note some dysphagia when the reflux is bad. She stopped meloxicam about 10 days ago. She has been taking omeprazole twice a day as well now. She notes improvement in symptoms but not complete resolution. She thinks her voice is 80% normal now. She is seeing ENT tomorrow.   She is concerned she has a blockage in her colon related to scar tissue from previous colon biopsies. She has a BM 1-2 times a day. She notes that BMs have been more regular since increasing fruits and vegetables in her diet in the past month. She notes that her RUQ pain improves with defecation.     Soc:  -denies smoking  -denies EtOH  -denies thc, illicits     Wt Readings from Last 6 Encounters:   10/22/24 192 lb (87.1 kg)   08/12/24 194 lb (88 kg)   08/12/24 185 lb (83.9 kg)   05/20/24 196 lb (88.9 kg)    05/19/22 186 lb (84.4 kg)   11/04/21 192 lb (87.1 kg)        History, Medications, Allergies, ROS:      Past Medical History:    Anemia    Borderline diabetes    Chronic rhinitis    Dysuria    Esophageal reflux    High blood pressure    History of eye injury    OS, Eye injury from curtain kiki    Osteoarthritis    Palpitations    PMB (postmenopausal bleeding)    Routine physical examination      Past Surgical History:   Procedure Laterality Date    Colonoscopy      Colonoscopy      Egd      Saleem localization wire 1 site right (cpt=19281)      Needle biopsy left      Other  05/2021    hysteroscopic polypectomy    Other surgical history Bilateral     fine needle breast biopsy - benign    Stress test scan      Upper gi endoscopy,exam        Family Hx:   Family History   Problem Relation Age of Onset    Hypertension Father     Lipids Father     Thyroid disease Father     Hypertension Mother     Lipids Mother     Thyroid disease Mother     Diabetes Maternal Grandmother     Diabetes Maternal Grandfather     Thyroid disease Sister     Thyroid disease Brother     Macular degeneration Neg     Glaucoma Neg       Social History:   Social History     Socioeconomic History    Marital status:    Tobacco Use    Smoking status: Never    Smokeless tobacco: Never   Vaping Use    Vaping status: Never Used   Substance and Sexual Activity    Alcohol use: No     Alcohol/week: 0.0 standard drinks of alcohol    Drug use: No   Other Topics Concern    Caffeine Concern Yes     Comment: 1 cup Coffee    Right Handed Yes        Medications (Active prior to today's visit):  Current Outpatient Medications   Medication Sig Dispense Refill    montelukast 10 MG Oral Tab Take 1 tablet by mouth 1 time each day in the evening. 90 tablet 3    Meloxicam 15 MG Oral Tab Take 1 tablet (15 mg total) by mouth daily. 30 tablet 0    carvedilol 12.5 MG Oral Tab Take 1 tablet (12.5 mg total) by mouth 2 (two) times daily with meals. 180 tablet 3     Omeprazole 40 MG Oral Capsule Delayed Release Take 1 capsule (40 mg total) by mouth Q12H. 180 capsule 3    levocetirizine 5 MG Oral Tab TAKE 1 TABLET BY MOUTH ONCE DAILY IN THE EVENING 90 tablet 3    losartan-hydroCHLOROthiazide 100-25 MG Oral Tab Take 1 tablet by mouth daily. 90 tablet 3    azelastine 0.1 % Nasal Solution 1 spray by Nasal route 2 (two) times daily. 3 each 1    MECLIZINE 25 MG Oral Tab Take 1 tablet by mouth 3  times daily as needed. 20 tablet 0    Meloxicam 15 MG Oral Tab TAKE 1/2 - 1 tablet BY MOUTH ONCE DAILY prn 30 tablet 0    Estradiol (ESTRACE) 0.1 MG/GM Vaginal Cream Place 1 g vaginally 3 (three) times a week. 1 Tube 5    Multiple Vitamins-Minerals (MULTI-VITAMIN/MINERALS) Oral Tab Take 1 tablet by mouth daily.      EPINEPHrine (EPIPEN 2-MARLENE) 0.3 MG/0.3ML Injection Solution Auto-injector Inject IM in event of  allergic reaction 1 each 0    Diclofenac Sodium 1 % Transdermal Gel APPLY TO THE AFFECTED AREA TWICE DAILY AS NEEDED 100 g 1    Mometasone Furoate 0.1 % External Cream External application 2 times daily as directed. 45 g 0    sucralfate 1 g Oral Tab Take 1 tablet (1 g total) by mouth 4 (four) times daily before meals and nightly for 14 days. 56 tablet 0    naproxen 500 MG Oral Tab Take 1 tablet (500 mg total) by mouth 2 (two) times daily with meals. Take with food. Stop if stomach upset. 60 tablet 0       Allergies:  Allergies[1]    ROS:   CONSTITUTIONAL:  negative for fevers, rigors  EYES:  negative for diplopia   RESPIRATORY:  negative for severe shortness of breath  CARDIOVASCULAR:  negative for crushing sub-sternal chest pain  GASTROINTESTINAL:  see HPI  GENITOURINARY:  negative for dysuria or gross hematuria  INTEGUMENT/BREAST:  SKIN:  negative for jaundice   ALLERGIC/IMMUNOLOGIC:  negative for hay fever  ENDOCRINE:  negative for cold intolerance and heat intolerance  MUSCULOSKELETAL:  negative for joint effusion/severe erythema  BEHAVIOR/PSYCH:  negative for psychotic  behavior    PHYSICAL EXAM:   Blood pressure 160/90, height 5' 3\" (1.6 m), weight 192 lb (87.1 kg), last menstrual period 05/08/2015, not currently breastfeeding.    Gen: appears comfortable and in no acute distress  HEENT: sclera appear anicteric, mucus membranes appear moist  CV: the extremities are warm and well-perfused   Lung: no increased work of breathing, no conversational dyspnea   Abd: soft, TTP to R-hemiabdomen, non-distended, no masses palpated  Skin: no jaundice, no apparent rashes   Neuro: alert and interactive, no focal neuro deficits  Psych: cooperative, normal affect     Labs/Imaging:     Patient's pertinent labs and imaging were reviewed and discussed with patient today.      ASSESSMENT/PLAN:   Lenin Rodriguez is a 59 year old woman with history of GERD, HTN, anemia presenting for evaluation of acid reflux.     She presents for hoarse voice and uncontrolled GERD despite BID PPI. Also with esophageal dysphagia. She previously had EGD for similar sx in 2021 with small hiatal hernia and gastric erythema. Esophageal biopsies (mid and distal) with reflux changes. Gastric biopsies negative for h pylori, notable for gastritis. She notes that symptoms recently worsened after taking meloxicam for a meniscal tear. Recommended repeat EGD given ongoing symptoms despite BID omeprazole and concern for possible ulcer in setting of NSAID use. She is also scheduled to see ENT regarding her voice changes.     She notes R-sided abdominal pain that has been present since she had a colonoscopy in 2021. Colonoscopy was normal at that time. Random biopsies were obtained for history of irregular bowel habits - normal. Reassured her that colon biopsies should not cause pain nor scar tissue development. Suspect some of her symptoms could be constipation related but given tenderness to the R hemiabdomen on exam, recommended CTAP. She is due for repeat colonoscopy for colon cancer screening 2031.        Recommendations:  Schedule a CT scan of your abdomen/pelvis.     Continue to take omeprazole twice a day.     1. Schedule an upper endoscopy (EGD) with MAC [Diagnosis: uncontrolled GERD despite PPI, dysphagia]    2. Do not eat or drink after midnight the night before your procedure.     3. Medication adjustments: Continue ALL medications as usual.    4. If you start any NEW medication after your visit today, please notify us. Certain medications will need to be held before the procedure, or the procedure cannot be performed.     5. You will need a ride home from your procedure since you are receiving sedation. Please ensure you will have an available ride home or the procedure cannot be performed.     Orders This Visit:  No orders of the defined types were placed in this encounter.      Meds This Visit:  Requested Prescriptions      No prescriptions requested or ordered in this encounter       Imaging & Referrals:  CT ABDOMEN+PELVIS(CONTRAST ONLY)(EEU=[id number])       Ashlee Victoria MD  [facility]  10/22/2024             [1]   Allergies  Allergen Reactions    Sulfa Antibiotics RASH, SWELLING and SHORTNESS OF BREATH    Lisinopril Coughing    Rash Away  [Sensi-Care Protective Barrier] RASH    Seasonal OTHER (SEE COMMENTS)     Runny nose, itchy eyes, congestion     Shrimp ANAPHYLAXIS

## 2024-10-22 NOTE — TELEPHONE ENCOUNTER
Called patient and scheduled appointment on 10/23 at 11:30am with Dr Cruz to discuss MRI results.

## 2024-10-22 NOTE — TELEPHONE ENCOUNTER
That is correct. I will discuss the plan with her when I see her in the office. You can add her onto my schedule Wednesday as my last appointment if she doesn't want to wait until the 30th. She should have had a F/U appt. Scheduled when the MRI was scheduled, as per our usual routine.

## 2024-10-23 ENCOUNTER — OFFICE VISIT (OUTPATIENT)
Dept: ORTHOPEDICS CLINIC | Facility: CLINIC | Age: 59
End: 2024-10-23
Payer: COMMERCIAL

## 2024-10-23 ENCOUNTER — OFFICE VISIT (OUTPATIENT)
Dept: OTOLARYNGOLOGY | Facility: CLINIC | Age: 59
End: 2024-10-23

## 2024-10-23 ENCOUNTER — HOSPITAL ENCOUNTER (OUTPATIENT)
Dept: ULTRASOUND IMAGING | Facility: HOSPITAL | Age: 59
Discharge: HOME | End: 2024-10-23
Attending: INTERNAL MEDICINE
Payer: COMMERCIAL

## 2024-10-23 VITALS — SYSTOLIC BLOOD PRESSURE: 163 MMHG | HEART RATE: 56 BPM | DIASTOLIC BLOOD PRESSURE: 78 MMHG

## 2024-10-23 DIAGNOSIS — S83.241D ACUTE MEDIAL MENISCUS TEAR OF RIGHT KNEE, SUBSEQUENT ENCOUNTER: Primary | ICD-10-CM

## 2024-10-23 DIAGNOSIS — N89.8 BLOODY VAGINAL DISCHARGE: ICD-10-CM

## 2024-10-23 DIAGNOSIS — K21.00 GASTROESOPHAGEAL REFLUX DISEASE WITH ESOPHAGITIS, UNSPECIFIED WHETHER HEMORRHAGE: Primary | ICD-10-CM

## 2024-10-23 DIAGNOSIS — J38.7: ICD-10-CM

## 2024-10-23 DIAGNOSIS — M17.11 PRIMARY OSTEOARTHRITIS OF RIGHT KNEE: ICD-10-CM

## 2024-10-23 PROCEDURE — 20610 DRAIN/INJ JOINT/BURSA W/O US: CPT | Performed by: ORTHOPAEDIC SURGERY

## 2024-10-23 PROCEDURE — 76856 US EXAM PELVIC COMPLETE: CPT | Performed by: INTERNAL MEDICINE

## 2024-10-23 PROCEDURE — 31575 DIAGNOSTIC LARYNGOSCOPY: CPT | Performed by: STUDENT IN AN ORGANIZED HEALTH CARE EDUCATION/TRAINING PROGRAM

## 2024-10-23 PROCEDURE — 76830 TRANSVAGINAL US NON-OB: CPT | Performed by: INTERNAL MEDICINE

## 2024-10-23 PROCEDURE — 99203 OFFICE O/P NEW LOW 30 MIN: CPT | Performed by: STUDENT IN AN ORGANIZED HEALTH CARE EDUCATION/TRAINING PROGRAM

## 2024-10-23 PROCEDURE — 99214 OFFICE O/P EST MOD 30 MIN: CPT | Performed by: ORTHOPAEDIC SURGERY

## 2024-10-23 RX ORDER — NAPROXEN 500 MG/1
500 TABLET ORAL 2 TIMES DAILY WITH MEALS
Qty: 60 TABLET | Refills: 0 | Status: SHIPPED | OUTPATIENT
Start: 2024-10-23
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
## 2024-10-23 NOTE — PROGRESS NOTES
Per verbal order from Dr. Cruz, draw up and 4ml of 0.5% Marcaine and 1ml of Kenalog 40 for injection into right knee. Loraine CARIAS RN  Patient provided education handout for cortisone injection.
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
## 2024-10-24 RX ORDER — SUCRALFATE 1 G/1
1 TABLET ORAL
Qty: 56 TABLET | Refills: 0 | Status: SHIPPED | OUTPATIENT
Start: 2024-10-24 | End: 2024-11-07

## 2024-10-25 RX ORDER — TRIAMCINOLONE ACETONIDE 40 MG/ML
40 INJECTION, SUSPENSION INTRA-ARTICULAR; INTRAMUSCULAR ONCE
Status: SHIPPED | OUTPATIENT
Start: 2024-10-25

## 2024-10-28 ENCOUNTER — TELEPHONE (OUTPATIENT)
Dept: CASE MANAGEMENT | Age: 59
End: 2024-10-28

## 2024-10-28 ENCOUNTER — TELEPHONE (OUTPATIENT)
Facility: CLINIC | Age: 59
End: 2024-10-28

## 2024-10-28 DIAGNOSIS — D22.9 NUMEROUS MOLES: Primary | ICD-10-CM

## 2024-10-28 DIAGNOSIS — Z13.89 SKIN CONDITION SCREENING: ICD-10-CM

## 2024-10-28 NOTE — TELEPHONE ENCOUNTER
Dr. Man,     Patient has questions regarding a skin condition.  She states she has dark raised spots on face.       PATIENT WILL CALL BACK WITH DERMATOLOGIST SHE CHOOSES AND PODIATRIST SHE CHOOSES

## 2024-10-28 NOTE — TELEPHONE ENCOUNTER
Spoke to patient    Let her know that our authorization department is working on completing the PA for her upcoming CT.  Patient had no further questions re:CT PA    Patient had questions regarding the sucralfate tablets. I gave her the instructions on how to make a slurry.    She verbalized understanding. And had no further questions      HORACE Rosado

## 2024-10-31 NOTE — TELEPHONE ENCOUNTER
Patient states that she is claustrophobic and is requesting a prescription for something that will relax her for the upcoming CT.  She is requesting the prescription to be sent to [place].  Please call

## 2024-10-31 NOTE — TELEPHONE ENCOUNTER
Dr. Rosado    Patient scheduled for CT on 11/5/2024  She is requesting a medication for claustrophobia     Thank you

## 2024-11-01 ENCOUNTER — TELEPHONE (OUTPATIENT)
Dept: ORTHOPEDICS CLINIC | Facility: CLINIC | Age: 59
End: 2024-11-01

## 2024-11-01 NOTE — TELEPHONE ENCOUNTER
Per patient wants to go forward with right knee procedure. Per patient asking for the next steps and will need an authorization. Please advise

## 2024-11-01 NOTE — TELEPHONE ENCOUNTER
Spoke to patient  Relayed message below    She verbalized understanding and had no further questions

## 2024-11-01 NOTE — TELEPHONE ENCOUNTER
Sent xanax - have her take one about 30 minutes before. IF still anxious, take 2nd dose right before scan. She should have a  since it can make you drowsy.

## 2024-11-04 NOTE — TELEPHONE ENCOUNTER
Okay to add her to my surgery schedule for knee arthroscopy, as long as she sees me in the office before her surgery date.

## 2024-11-05 ENCOUNTER — HOSPITAL ENCOUNTER (OUTPATIENT)
Dept: CT IMAGING | Facility: HOSPITAL | Age: 59
Discharge: HOME | End: 2024-11-05
Attending: INTERNAL MEDICINE
Payer: COMMERCIAL

## 2024-11-05 DIAGNOSIS — R10.31 CHRONIC RLQ PAIN: ICD-10-CM

## 2024-11-05 DIAGNOSIS — G89.29 CHRONIC RLQ PAIN: ICD-10-CM

## 2024-11-05 LAB
CREAT BLD-MCNC: 0.7 MG/DL
EGFRCR SERPLBLD CKD-EPI 2021: 99 ML/MIN/1.73M2 (ref 60–?)

## 2024-11-05 PROCEDURE — 82565 ASSAY OF CREATININE: CPT

## 2024-11-05 PROCEDURE — 74177 CT ABD & PELVIS W/CONTRAST: CPT | Performed by: INTERNAL MEDICINE

## 2024-11-07 ENCOUNTER — TELEPHONE (OUTPATIENT)
Dept: ORTHOPEDICS CLINIC | Facility: CLINIC | Age: 59
End: 2024-11-07

## 2024-11-07 DIAGNOSIS — S83.241D ACUTE MEDIAL MENISCUS TEAR OF RIGHT KNEE, SUBSEQUENT ENCOUNTER: Primary | ICD-10-CM

## 2024-11-07 NOTE — TELEPHONE ENCOUNTER
Spoke with patient to offer surgery dates. She chose 12/17. Patient was reminded that Medical and/or Dental clearance is needed within 30 days of surgical date. Failure to complete all testing in a timely manner will cause surgery to be delayed and/or rescheduled. Patient understood and had no further questions at this time.

## 2024-11-07 NOTE — TELEPHONE ENCOUNTER
Type of surgery:  Right knee arthroplasty  Date: 12/17/24  Location: [facility]  Medical Clearance:      *Medical: yes      *Dental:      *Other:  Prior Authorization Status: Pending   Workers Comp:  Medacta/Fifi:  [place]: Yes  POV: 1/8/25

## 2024-11-12 NOTE — TELEPHONE ENCOUNTER
LMTCB
Patient would like to move her appt with Dr Denton Soriano from [facility] 2/9 at 1:00 to some time this week after Wed 10:30am.  She is having a 48 heart monitor put on this morning and would prefer to not have this appointment with that on. Please call her if there is another time she can be scheduled this week.
Pt canceled her appt on 2/9 and scheduled one with [address] on 2/11. Pt given the date, time and location for the aptp.
Pt rc
Price (Do Not Change): 0.00
Instructions: This plan will send the code FBSD to the PM system.  DO NOT or CHANGE the price.
Detail Level: Simple

## 2024-11-20 ENCOUNTER — OFFICE VISIT (OUTPATIENT)
Dept: INTERNAL MEDICINE CLINIC | Facility: CLINIC | Age: 59
End: 2024-11-20

## 2024-11-20 VITALS
HEIGHT: 63 IN | DIASTOLIC BLOOD PRESSURE: 69 MMHG | HEART RATE: 76 BPM | BODY MASS INDEX: 33.84 KG/M2 | WEIGHT: 191 LBS | OXYGEN SATURATION: 100 % | SYSTOLIC BLOOD PRESSURE: 112 MMHG | TEMPERATURE: 98 F

## 2024-11-20 DIAGNOSIS — S83.241D ACUTE MEDIAL MENISCUS TEAR OF RIGHT KNEE, SUBSEQUENT ENCOUNTER: Primary | ICD-10-CM

## 2024-11-20 DIAGNOSIS — Z76.89 ENCOUNTER TO ESTABLISH CARE: ICD-10-CM

## 2024-11-20 DIAGNOSIS — Z01.810 PREOP CARDIOVASCULAR EXAM: ICD-10-CM

## 2024-11-20 DIAGNOSIS — Z01.818 PREOP EXAM FOR INTERNAL MEDICINE: ICD-10-CM

## 2024-11-20 LAB
ALBUMIN SERPL-MCNC: 4.4 G/DL (ref 3.2–4.8)
ALBUMIN/GLOB SERPL: 1.2 {RATIO} (ref 1–2)
ALP LIVER SERPL-CCNC: 78 U/L
ALT SERPL-CCNC: 13 U/L
ANION GAP SERPL CALC-SCNC: 4 MMOL/L (ref 0–18)
AST SERPL-CCNC: 14 U/L (ref ?–34)
ATRIAL RATE: 69 BPM
BASOPHILS # BLD AUTO: 0.04 X10(3) UL (ref 0–0.2)
BASOPHILS NFR BLD AUTO: 0.6 %
BILIRUB SERPL-MCNC: 0.5 MG/DL (ref 0.2–1.1)
BUN BLD-MCNC: 12 MG/DL (ref 9–23)
BUN/CREAT SERPL: 16 (ref 10–20)
CALCIUM BLD-MCNC: 10.1 MG/DL (ref 8.7–10.4)
CHLORIDE SERPL-SCNC: 105 MMOL/L (ref 98–112)
CO2 SERPL-SCNC: 31 MMOL/L (ref 21–32)
CREAT BLD-MCNC: 0.75 MG/DL
DEPRECATED RDW RBC AUTO: 54.1 FL (ref 35.1–46.3)
EGFRCR SERPLBLD CKD-EPI 2021: 91 ML/MIN/1.73M2 (ref 60–?)
EOSINOPHIL # BLD AUTO: 0.2 X10(3) UL (ref 0–0.7)
EOSINOPHIL NFR BLD AUTO: 3.1 %
ERYTHROCYTE [DISTWIDTH] IN BLOOD BY AUTOMATED COUNT: 15.7 % (ref 11–15)
FASTING STATUS PATIENT QL REPORTED: YES
GLOBULIN PLAS-MCNC: 3.6 G/DL (ref 2–3.5)
GLUCOSE BLD-MCNC: 124 MG/DL (ref 70–99)
HCT VFR BLD AUTO: 39.4 %
HGB BLD-MCNC: 12.2 G/DL
IMM GRANULOCYTES # BLD AUTO: 0.02 X10(3) UL (ref 0–1)
IMM GRANULOCYTES NFR BLD: 0.3 %
LYMPHOCYTES # BLD AUTO: 1.68 X10(3) UL (ref 1–4)
LYMPHOCYTES NFR BLD AUTO: 25.9 %
MCH RBC QN AUTO: 28.6 PG (ref 26–34)
MCHC RBC AUTO-ENTMCNC: 31 G/DL (ref 31–37)
MCV RBC AUTO: 92.3 FL
MONOCYTES # BLD AUTO: 0.34 X10(3) UL (ref 0.1–1)
MONOCYTES NFR BLD AUTO: 5.2 %
NEUTROPHILS # BLD AUTO: 4.21 X10 (3) UL (ref 1.5–7.7)
NEUTROPHILS # BLD AUTO: 4.21 X10(3) UL (ref 1.5–7.7)
NEUTROPHILS NFR BLD AUTO: 64.9 %
OSMOLALITY SERPL CALC.SUM OF ELEC: 291 MOSM/KG (ref 275–295)
P AXIS: 74 DEGREES
P-R INTERVAL: 146 MS
PLATELET # BLD AUTO: 262 10(3)UL (ref 150–450)
POTASSIUM SERPL-SCNC: 3.2 MMOL/L (ref 3.5–5.1)
PROT SERPL-MCNC: 8 G/DL (ref 5.7–8.2)
Q-T INTERVAL: 404 MS
QRS DURATION: 92 MS
QTC CALCULATION (BEZET): 432 MS
R AXIS: 11 DEGREES
RBC # BLD AUTO: 4.27 X10(6)UL
SODIUM SERPL-SCNC: 140 MMOL/L (ref 136–145)
T AXIS: 82 DEGREES
VENTRICULAR RATE: 69 BPM
WBC # BLD AUTO: 6.5 X10(3) UL (ref 4–11)

## 2024-11-20 PROCEDURE — 36415 COLL VENOUS BLD VENIPUNCTURE: CPT | Performed by: INTERNAL MEDICINE

## 2024-11-20 PROCEDURE — 93000 ELECTROCARDIOGRAM COMPLETE: CPT | Performed by: INTERNAL MEDICINE

## 2024-11-20 PROCEDURE — 99213 OFFICE O/P EST LOW 20 MIN: CPT | Performed by: INTERNAL MEDICINE

## 2024-11-20 RX ORDER — EPINEPHRINE 0.3 MG/.3ML
INJECTION SUBCUTANEOUS
Qty: 1 EACH | Refills: 0 | Status: SHIPPED | OUTPATIENT
Start: 2024-11-20

## 2024-11-20 NOTE — PROGRESS NOTES
Subjective:     Patient ID: Lenin Rodriguez is a 59 year old female.    HPI    History/Other:   Review of Systems  Current Outpatient Medications   Medication Sig Dispense Refill    naproxen 500 MG Oral Tab Take 1 tablet (500 mg total) by mouth 2 (two) times daily with meals. Take with food. Stop if stomach upset. 60 tablet 0    montelukast 10 MG Oral Tab Take 1 tablet by mouth 1 time each day in the evening. 90 tablet 3    Meloxicam 15 MG Oral Tab Take 1 tablet (15 mg total) by mouth daily. 30 tablet 0    carvedilol 12.5 MG Oral Tab Take 1 tablet (12.5 mg total) by mouth 2 (two) times daily with meals. 180 tablet 3    Omeprazole 40 MG Oral Capsule Delayed Release Take 1 capsule (40 mg total) by mouth Q12H. 180 capsule 3    levocetirizine 5 MG Oral Tab TAKE 1 TABLET BY MOUTH ONCE DAILY IN THE EVENING 90 tablet 3    losartan-hydroCHLOROthiazide 100-25 MG Oral Tab Take 1 tablet by mouth daily. 90 tablet 3    azelastine 0.1 % Nasal Solution 1 spray by Nasal route 2 (two) times daily. 3 each 1    MECLIZINE 25 MG Oral Tab Take 1 tablet by mouth 3  times daily as needed. 20 tablet 0    Meloxicam 15 MG Oral Tab TAKE 1/2 - 1 tablet BY MOUTH ONCE DAILY prn 30 tablet 0    Estradiol (ESTRACE) 0.1 MG/GM Vaginal Cream Place 1 g vaginally 3 (three) times a week. 1 Tube 5    Multiple Vitamins-Minerals (MULTI-VITAMIN/MINERALS) Oral Tab Take 1 tablet by mouth daily.      EPINEPHrine (EPIPEN 2-MARLENE) 0.3 MG/0.3ML Injection Solution Auto-injector Inject IM in event of  allergic reaction 1 each 0    Diclofenac Sodium 1 % Transdermal Gel APPLY TO THE AFFECTED AREA TWICE DAILY AS NEEDED 100 g 1    Mometasone Furoate 0.1 % External Cream External application 2 times daily as directed. 45 g 0     Allergies:Allergies[1]    Past Medical History:    Anemia    Borderline diabetes    Chronic rhinitis    Dysuria    Esophageal reflux    High blood pressure    History of eye injury    OS, Eye injury from curtain kiki    Osteoarthritis     Palpitations    PMB (postmenopausal bleeding)    Routine physical examination      Past Surgical History:   Procedure Laterality Date    Colonoscopy      Colonoscopy      Egd      Saleem localization wire 1 site right (cpt=19281)      Needle biopsy left      Other  05/2021    hysteroscopic polypectomy    Other surgical history Bilateral     fine needle breast biopsy - benign    Stress test scan      Upper gi endoscopy,exam        Family History   Problem Relation Age of Onset    Hypertension Father     Lipids Father     Thyroid disease Father     Hypertension Mother     Lipids Mother     Thyroid disease Mother     Diabetes Maternal Grandmother     Diabetes Maternal Grandfather     Thyroid disease Sister     Thyroid disease Brother     Macular degeneration Neg     Glaucoma Neg       Social History:   Social History     Socioeconomic History    Marital status:    Tobacco Use    Smoking status: Never    Smokeless tobacco: Never   Vaping Use    Vaping status: Never Used   Substance and Sexual Activity    Alcohol use: No     Alcohol/week: 0.0 standard drinks of alcohol    Drug use: No   Other Topics Concern    Caffeine Concern Yes     Comment: 1 cup Coffee    Right Handed Yes        Objective:   Physical Exam  Vitals and nursing note reviewed.   Constitutional:       Appearance: Normal appearance.   HENT:      Head: Normocephalic and atraumatic.   Cardiovascular:      Rate and Rhythm: Normal rate and regular rhythm.      Pulses: Normal pulses.      Heart sounds: Normal heart sounds.   Pulmonary:      Effort: Pulmonary effort is normal.      Breath sounds: Normal breath sounds.   Musculoskeletal:         General: Normal range of motion.      Cervical back: Normal range of motion and neck supple.   Skin:     General: Skin is warm.   Neurological:      Mental Status: She is alert.         Assessment & Plan:   No diagnosis found.    No orders of the defined types were placed in this encounter.      Meds This  Visit:  Requested Prescriptions      No prescriptions requested or ordered in this encounter       Imaging & Referrals:  None            [1]   Allergies  Allergen Reactions    Sulfa Antibiotics RASH, SWELLING and SHORTNESS OF BREATH    Lisinopril Coughing    Rash Away  [Sensi-Care Protective Barrier] RASH    Seasonal OTHER (SEE COMMENTS)     Runny nose, itchy eyes, congestion     Shrimp ANAPHYLAXIS

## 2024-11-21 NOTE — PROGRESS NOTES
Lenin Rodriguez is a 59 year old female who presents for a pre-operative physical exam. Patient is to have right knee arthroscopy   to be done by Dr. Cruz at [facility] on December 17, 2024    Chief Complaint   Patient presents with    Pre-Op Exam     Having Sx on 12/17/24 for R-knee, Dr. Cruz. Per Pt she will need EKG, CBC, CMP    Referral     Derm, Optho       HPI:   Patient denies any complain    Current Outpatient Medications   Medication Sig Dispense Refill    EPINEPHrine (EPIPEN 2-MARLENE) 0.3 MG/0.3ML Injection Solution Auto-injector Inject IM in event of  allergic reaction 1 each 0    naproxen 500 MG Oral Tab Take 1 tablet (500 mg total) by mouth 2 (two) times daily with meals. Take with food. Stop if stomach upset. 60 tablet 0    montelukast 10 MG Oral Tab Take 1 tablet by mouth 1 time each day in the evening. 90 tablet 3    carvedilol 12.5 MG Oral Tab Take 1 tablet (12.5 mg total) by mouth 2 (two) times daily with meals. 180 tablet 3    Omeprazole 40 MG Oral Capsule Delayed Release Take 1 capsule (40 mg total) by mouth Q12H. 180 capsule 3    levocetirizine 5 MG Oral Tab TAKE 1 TABLET BY MOUTH ONCE DAILY IN THE EVENING 90 tablet 3    losartan-hydroCHLOROthiazide 100-25 MG Oral Tab Take 1 tablet by mouth daily. 90 tablet 3    azelastine 0.1 % Nasal Solution 1 spray by Nasal route 2 (two) times daily. 3 each 1    MECLIZINE 25 MG Oral Tab Take 1 tablet by mouth 3  times daily as needed. 20 tablet 0    Meloxicam 15 MG Oral Tab TAKE 1/2 - 1 tablet BY MOUTH ONCE DAILY prn 30 tablet 0    Diclofenac Sodium 1 % Transdermal Gel APPLY TO THE AFFECTED AREA TWICE DAILY AS NEEDED 100 g 1    Mometasone Furoate 0.1 % External Cream External application 2 times daily as directed. 45 g 0    Potassium Chloride ER 20 MEQ Oral Tab CR Take 20 mEq by mouth 2 (two) times daily. 4 tablet 0    Estradiol (ESTRACE) 0.1 MG/GM Vaginal Cream Place 1 g vaginally 3 (three) times a week. 1 Tube 5      Allergies: Allergies[1]    Past Medical History:    Anemia    Borderline diabetes    Chronic rhinitis    Dysuria    Esophageal reflux    High blood pressure    History of eye injury    OS, Eye injury from curtain kiki    Osteoarthritis    Palpitations    PMB (postmenopausal bleeding)    Routine physical examination      Past Surgical History:   Procedure Laterality Date    Colonoscopy      Colonoscopy      Egd      Saleem localization wire 1 site right (cpt=19281)      Needle biopsy left      Other  05/2021    hysteroscopic polypectomy    Other surgical history Bilateral     fine needle breast biopsy - benign    Stress test scan      Upper gi endoscopy,exam        Family History   Problem Relation Age of Onset    Hypertension Father     Lipids Father     Thyroid disease Father     Hypertension Mother     Lipids Mother     Thyroid disease Mother     Diabetes Maternal Grandmother     Diabetes Maternal Grandfather     Thyroid disease Sister     Thyroid disease Brother     Macular degeneration Neg     Glaucoma Neg       Social History     Socioeconomic History    Marital status:    Tobacco Use    Smoking status: Never    Smokeless tobacco: Never   Vaping Use    Vaping status: Never Used   Substance and Sexual Activity    Alcohol use: No     Alcohol/week: 0.0 standard drinks of alcohol    Drug use: No   Other Topics Concern    Caffeine Concern Yes     Comment: 1 cup Coffee    Right Handed Yes         REVIEW OF SYSTEMS:   GENERAL: feels well otherwise  SKIN: denies any unusual skin lesions  EYES:denies blurred vision or double vision  HEENT: denies nasal congestion, sinus pain or sore throat  LUNGS: denies shortness of breath with exertion  CARDIOVASCULAR: denies chest pain on exertion  GI: denies abdominal pain  : denies dysuria  MUSCULOSKELETAL: denies joint pain  NEURO: denies headaches  PSYCHE: denies depression or anxiety  HEMATOLOGIC: denies hx of anemia  ALL/ASTHMA: denies hx of allergy or asthma    EXAM:   /69 (BP Location:  Left arm, Patient Position: Sitting, Cuff Size: large)   Pulse 76   Temp 97.9 °F (36.6 °C) (Temporal)   Ht 5' 3\" (1.6 m)   Wt 191 lb (86.6 kg)   LMP 05/08/2015   SpO2 100%   BMI 33.83 kg/m²  Body mass index is 33.83 kg/m².  GENERAL: well developed, well nourished,in no apparent distress  SKIN: no rashes,no suspicious lesions  HEENT: atraumatic, normocephalic,ears and throat are clear  EYES:PERRLA, EOMI,conjunctiva are clear  NECK: supple,no adenopathy,no bruits  CHEST: no chest tenderness  BREAST: no dominant or suspicious mass  LUNGS: clear to auscultation  CARDIO: RRR without murmur  GI: good BS's,no masses, HSM or tenderness  : deferred  EXTREMITIES: edema  NEURO: Oriented times three,cranial nerves are intact,motor and sensory are grossly intact    Recent Labs:   Results for orders placed or performed in visit on 11/20/24   CBC W Differential W Platelet [E]    Collection Time: 11/20/24  1:40 PM   Result Value Ref Range    WBC 6.5 4.0 - 11.0 x10(3) uL    RBC 4.27 3.80 - 5.30 x10(6)uL    HGB 12.2 12.0 - 16.0 g/dL    HCT 39.4 35.0 - 48.0 %    MCV 92.3 80.0 - 100.0 fL    MCH 28.6 26.0 - 34.0 pg    MCHC 31.0 31.0 - 37.0 g/dL    RDW-SD 54.1 (H) 35.1 - 46.3 fL    RDW 15.7 (H) 11.0 - 15.0 %    .0 150.0 - 450.0 10(3)uL    Neutrophil Absolute Prelim 4.21 1.50 - 7.70 x10 (3) uL    Neutrophil Absolute 4.21 1.50 - 7.70 x10(3) uL    Lymphocyte Absolute 1.68 1.00 - 4.00 x10(3) uL    Monocyte Absolute 0.34 0.10 - 1.00 x10(3) uL    Eosinophil Absolute 0.20 0.00 - 0.70 x10(3) uL    Basophil Absolute 0.04 0.00 - 0.20 x10(3) uL    Immature Granulocyte Absolute 0.02 0.00 - 1.00 x10(3) uL    Neutrophil % 64.9 %    Lymphocyte % 25.9 %    Monocyte % 5.2 %    Eosinophil % 3.1 %    Basophil % 0.6 %    Immature Granulocyte % 0.3 %   Comp Metabolic Panel (14)    Collection Time: 11/20/24  1:40 PM   Result Value Ref Range    Glucose 124 (H) 70 - 99 mg/dL    Sodium 140 136 - 145 mmol/L    Potassium 3.2 (L) 3.5 - 5.1 mmol/L     Chloride 105 98 - 112 mmol/L    CO2 31.0 21.0 - 32.0 mmol/L    Anion Gap 4 0 - 18 mmol/L    BUN 12 9 - 23 mg/dL    Creatinine 0.75 0.55 - 1.02 mg/dL    BUN/CREA Ratio 16.0 10.0 - 20.0    Calcium, Total 10.1 8.7 - 10.4 mg/dL    Calculated Osmolality 291 275 - 295 mOsm/kg    eGFR-Cr 91 >=60 mL/min/1.73m2    ALT 13 10 - 49 U/L    AST 14 <34 U/L    Alkaline Phosphatase 78 46 - 118 U/L    Bilirubin, Total 0.5 0.2 - 1.1 mg/dL    Total Protein 8.0 5.7 - 8.2 g/dL    Albumin 4.4 3.2 - 4.8 g/dL    Globulin  3.6 (H) 2.0 - 3.5 g/dL    A/G Ratio 1.2 1.0 - 2.0    Patient Fasting for CMP? Yes    EKG In-Office [28400]    Collection Time: 11/20/24  7:51 PM   Result Value Ref Range    Ventricular rate 69 BPM    Atrial rate 69 BPM    P-R Interval 146 ms    QRS Duration 92 ms    Q-T Interval 404 ms    QTC Calculation (Bezet) 432 ms    P Axis 74 degrees    R Axis 11 degrees    T Axis 82 degrees       ASSESSMENT AND PLAN:   Lenin Rodriguez is a 59 year old female who presents for a pre-operative physical exam.      Acute right  medial meniscus tear scheduled for surgery  Htn- monitor blood pressure , continue with current medications   Heme  No history of bleeding disorder, no evidence of significant anemia  CBC ordered    Renal  No hx of renal disease  CMP ordered      Assessment:  Encounter Diagnoses   Name Primary?    Preop exam for internal medicine Yes    Preop cardiovascular exam        Plan   Orders:cbc, cmp , ekg  Orders Placed This Encounter   Procedures    CBC W Differential W Platelet [E]    Comp Metabolic Panel (14)     Medications filled today:  Requested Prescriptions     Signed Prescriptions Disp Refills    EPINEPHrine (EPIPEN 2-MARLENE) 0.3 MG/0.3ML Injection Solution Auto-injector 1 each 0     Sig: Inject IM in event of  allergic reaction     Radiology orders:  ELECTROCARDIOGRAM, COMPLETE    No follow-ups on file.             [1]   Allergies  Allergen Reactions    Sulfa Antibiotics RASH, SWELLING and SHORTNESS OF BREATH     Lisinopril Coughing    Rash Away  [Sensi-Care Protective Barrier] RASH    Seasonal OTHER (SEE COMMENTS)     Runny nose, itchy eyes, congestion     Shrimp ANAPHYLAXIS

## 2024-12-03 ENCOUNTER — TELEPHONE (OUTPATIENT)
Facility: CLINIC | Age: 59
End: 2024-12-03

## 2024-12-03 NOTE — TELEPHONE ENCOUNTER
Patient is calling to be placed on a wait list for a sooner appointment.  She states that she has reached max out of pocket with insurance.    Please note patient is scheduled for a Right Knee Arthroscopy on 12/17/24.  Please call

## 2024-12-04 ENCOUNTER — LAB ENCOUNTER (OUTPATIENT)
Dept: LAB | Facility: HOSPITAL | Age: 59
End: 2024-12-04
Attending: INTERNAL MEDICINE
Payer: COMMERCIAL

## 2024-12-04 ENCOUNTER — OFFICE VISIT (OUTPATIENT)
Dept: ORTHOPEDICS CLINIC | Facility: CLINIC | Age: 59
End: 2024-12-04

## 2024-12-04 DIAGNOSIS — E11.00 TYPE 2 DIABETES MELLITUS WITH HYPEROSMOLARITY WITHOUT COMA, WITHOUT LONG-TERM CURRENT USE OF INSULIN (HCC): ICD-10-CM

## 2024-12-04 DIAGNOSIS — E87.6 HYPOKALEMIA: ICD-10-CM

## 2024-12-04 DIAGNOSIS — S83.241D ACUTE MEDIAL MENISCUS TEAR OF RIGHT KNEE, SUBSEQUENT ENCOUNTER: Primary | ICD-10-CM

## 2024-12-04 LAB
EST. AVERAGE GLUCOSE BLD GHB EST-MCNC: 151 MG/DL (ref 68–126)
HBA1C MFR BLD: 6.9 % (ref ?–5.7)
POTASSIUM SERPL-SCNC: 4.3 MMOL/L (ref 3.5–5.1)
TSI SER-ACNC: 2.91 UIU/ML (ref 0.55–4.78)

## 2024-12-04 PROCEDURE — 99214 OFFICE O/P EST MOD 30 MIN: CPT | Performed by: ORTHOPAEDIC SURGERY

## 2024-12-04 PROCEDURE — 83036 HEMOGLOBIN GLYCOSYLATED A1C: CPT

## 2024-12-04 PROCEDURE — 84132 ASSAY OF SERUM POTASSIUM: CPT

## 2024-12-04 PROCEDURE — 36415 COLL VENOUS BLD VENIPUNCTURE: CPT | Performed by: INTERNAL MEDICINE

## 2024-12-04 PROCEDURE — 84443 ASSAY THYROID STIM HORMONE: CPT | Performed by: INTERNAL MEDICINE

## 2024-12-04 NOTE — PROGRESS NOTES
NURSING INTAKE COMMENTS:   Chief Complaint   Patient presents with    Pre-Op     Scheduled for right knee arthroscopy 12/17/24       HPI: This 59 year old female presents today with complaints of persistent pain in the right knee with symptoms of locking and medial joint line pain.  She had temporary relief following a cortisone injection, but her symptoms have recurred.    Past Medical History:    Anemia    Borderline diabetes    Chronic rhinitis    Dysuria    Esophageal reflux    High blood pressure    History of eye injury    OS, Eye injury from curtain kiki    Osteoarthritis    Palpitations    PMB (postmenopausal bleeding)    Routine physical examination     Past Surgical History:   Procedure Laterality Date    Colonoscopy      Colonoscopy      Egd      Saleem localization wire 1 site right (cpt=19281)      Needle biopsy left      Other  05/2021    hysteroscopic polypectomy    Other surgical history Bilateral     fine needle breast biopsy - benign    Stress test scan      Upper gi endoscopy,exam       Current Outpatient Medications   Medication Sig Dispense Refill    Potassium Chloride ER 20 MEQ Oral Tab CR Take 20 mEq by mouth 2 (two) times daily. 4 tablet 0    EPINEPHrine (EPIPEN 2-MARLENE) 0.3 MG/0.3ML Injection Solution Auto-injector Inject IM in event of  allergic reaction 1 each 0    naproxen 500 MG Oral Tab Take 1 tablet (500 mg total) by mouth 2 (two) times daily with meals. Take with food. Stop if stomach upset. 60 tablet 0    montelukast 10 MG Oral Tab Take 1 tablet by mouth 1 time each day in the evening. 90 tablet 3    carvedilol 12.5 MG Oral Tab Take 1 tablet (12.5 mg total) by mouth 2 (two) times daily with meals. 180 tablet 3    Omeprazole 40 MG Oral Capsule Delayed Release Take 1 capsule (40 mg total) by mouth Q12H. 180 capsule 3    levocetirizine 5 MG Oral Tab TAKE 1 TABLET BY MOUTH ONCE DAILY IN THE EVENING 90 tablet 3    losartan-hydroCHLOROthiazide 100-25 MG Oral Tab Take 1 tablet by mouth daily. 90  tablet 3    azelastine 0.1 % Nasal Solution 1 spray by Nasal route 2 (two) times daily. 3 each 1    MECLIZINE 25 MG Oral Tab Take 1 tablet by mouth 3  times daily as needed. 20 tablet 0    Meloxicam 15 MG Oral Tab TAKE 1/2 - 1 tablet BY MOUTH ONCE DAILY prn 30 tablet 0    Estradiol (ESTRACE) 0.1 MG/GM Vaginal Cream Place 1 g vaginally 3 (three) times a week. 1 Tube 5    Diclofenac Sodium 1 % Transdermal Gel APPLY TO THE AFFECTED AREA TWICE DAILY AS NEEDED 100 g 1    Mometasone Furoate 0.1 % External Cream External application 2 times daily as directed. 45 g 0     Allergies[1]  Family History   Problem Relation Age of Onset    Hypertension Father     Lipids Father     Thyroid disease Father     Hypertension Mother     Lipids Mother     Thyroid disease Mother     Diabetes Maternal Grandmother     Diabetes Maternal Grandfather     Thyroid disease Sister     Thyroid disease Brother     Macular degeneration Neg     Glaucoma Neg        Social History     Occupational History    Not on file   Tobacco Use    Smoking status: Never    Smokeless tobacco: Never   Vaping Use    Vaping status: Never Used   Substance and Sexual Activity    Alcohol use: No     Alcohol/week: 0.0 standard drinks of alcohol    Drug use: No    Sexual activity: Not on file        Review of Systems:  GENERAL: feels generally well, no significant weight loss or weight gain  SKIN: no ulcerated or worrisome skin lesions  EYES:denies blurred vision or double vision  HEENT: denies new nasal congestion, sinus pain or ST  LUNGS: denies shortness of breath  CARDIOVASCULAR: denies chest pain  GI: no hematemesis, no worsening heartburn, no diarrhea  : no dysuria, no blood in urine, no difficulty urinating, no incontinence  MUSCULOSKELETAL: no other musculoskeletal complaints other than in HPI  NEURO: no numbness or tingling, no weakness or balance disorder  PSYCHE: no depression or anxiety  HEMATOLOGIC: no hx of blood dyscrasia  ENDOCRINE: no thyroid or diabetes  issues  ALL/ASTHMA: no new hx of severe allergy or asthma    Physical Examination:    [facility] 05/08/2015   Constitutional: appears well hydrated, alert and responsive, no acute distress noted  Extremities: Effusion of the right knee.  Medial joint line tenderness.  Positive Antonio's test.    Imaging: CT ABDOMEN+PELVIS(CONTRAST ONLY)(CPT=74177)    Result Date: 11/6/2024  PROCEDURE: CT ABDOMEN + PELVIS (CONTRAST ONLY) (CPT=74177)  COMPARISON: [facility], CT APPENDIX ABD PEL W CONTRAST (XSD=38042), 7/05/2018, 3:57 PM.  [facility], US PELVIS W EV (CPT=76856/48201), 6/09/2022, 3:44 PM.  INDICATIONS: Chronic RLQ pain  TECHNIQUE: Multidetector CT images of the abdomen and pelvis were obtained with non-ionic intravenous contrast material. Automated exposure control for dose reduction was used. Adjustment of the mA and/or kV was done based on the patient's size. Iterative reconstruction technique for dose reduction was employed. Oral contrast was ingested.  FINDINGS: LUNG BASES: The heart is normal in size. There is dependent subsegmental atelectasis bilaterally; coarse calcification in the left breast. LIVER: No enlargement, atrophy, abnormal density, or significant focal lesion is identified.  BILIARY: The gallbladder is present. PANCREAS: No lesion, fluid collection, ductal dilatation, or atrophy.  SPLEEN: No enlargement.  ADRENALS:   No defined mass or abnormal enlargement.  KIDNEYS:   Symmetric enhancement is seen without evidence of hydronephrosis or underlying solid masses. GI/MESENTERY:  There is no evidence of bowel obstruction.  Mild to moderate gastric distention noted; enteric contrast propagates to the mid-distal small bowel.  Please note that segments of the colon are incompletely distended and suboptimally evaluated, mild-to-moderate colonic fecal burden.  Normal appendix. URINARY BLADDER: Incompletely distended and suboptimally evaluated. PELVIC NODES: No  lymphadenopathy.   PELVIC ORGANS: Fibroid uterus with some partially calcified/degenerated fibroids. VASCULATURE:   No aneurysm is detected.  Minor atherosclerosis with a circumaortic left renal vein RETROPERITONEUM: No mass or lymphadenopathy is apparent.  BONES:   Multilevel lumbar spine degenerative changes with trace degenerative anterolisthesis of L4 relative to L5. Scattered sclerotic foci likely reflect bone islands. ABDOMINAL WALL: Small fat containing umbilical hernia. OTHER: No free air or fluid is seen in the abdomen or pelvis.  Stable lipoma at the left iliopsoas muscle.         CONCLUSION:  1. No acute intra-abdominal finding.  No evidence of acute appendicitis. 2. Fibroid uterus with some partially calcified/degenerated fibroids. 3. Circumferential urinary bladder wall thickening, which may relate to incomplete distention or cystitis.  If there are referable symptoms, urinalysis correlation is requested. 4. Lesser incidental findings as above.   elm-remote  Dictated by (CST): Heriberto Chong MD on 11/06/2024 at 10:40 AM     Finalized by (CST): Heriberto Chong MD on 11/06/2024 at 10:45 AM             Lab Results   Component Value Date    WBC 6.5 11/20/2024    HGB 12.2 11/20/2024    .0 11/20/2024      Lab Results   Component Value Date     (H) 11/20/2024    BUN 12 11/20/2024    CREATSERUM 0.75 11/20/2024    GFRNAA 95 05/19/2022    GFRAA 109 05/19/2022        Assessment and Plan:  There are no diagnoses linked to this encounter.    Assessment: She has minimal degenerative change on her x-ray and a complex tear of the posterior horn of the medial meniscus extending to the root with symptoms of locking and pain.    Plan: We reviewed the treatment options including both nonsurgical and surgical treatment.  We talked about meniscal root repair versus meniscectomy the prognosis for each as well as the anticipated recovery.  We talked about the fact that sometimes a complex meniscal tear involving  the root is the precursor of rapidly developing arthritis that could result in the need for knee replacement in the short-term future.  She understands and would like to proceed with arthroscopy and partial medial meniscectomy.    The above note was creating using Dragon speech recognition technology. Please excuse any typos.    ELKE BUI MD       [1]   Allergies  Allergen Reactions    Sulfa Antibiotics RASH, SWELLING and SHORTNESS OF BREATH    Lisinopril Coughing    Rash Away  [Sensi-Care Protective Barrier] RASH    Seasonal OTHER (SEE COMMENTS)     Runny nose, itchy eyes, congestion     Shrimp ANAPHYLAXIS

## 2024-12-05 ENCOUNTER — TELEPHONE (OUTPATIENT)
Dept: INTERNAL MEDICINE CLINIC | Facility: CLINIC | Age: 59
End: 2024-12-05

## 2024-12-05 NOTE — TELEPHONE ENCOUNTER
Pt is asking for derm referral for pigmentation around eyes and chin, pt states she mentioned this to Md during last visit. Would like to see specialist at [facility].

## 2024-12-12 RX ORDER — MAGNESIUM 30 MG
30 TABLET ORAL 2 TIMES DAILY
COMMUNITY

## 2024-12-14 ENCOUNTER — OFFICE VISIT (OUTPATIENT)
Dept: OBGYN CLINIC | Facility: CLINIC | Age: 59
End: 2024-12-14

## 2024-12-14 VITALS
HEART RATE: 66 BPM | SYSTOLIC BLOOD PRESSURE: 154 MMHG | WEIGHT: 194.38 LBS | DIASTOLIC BLOOD PRESSURE: 86 MMHG | BODY MASS INDEX: 34 KG/M2

## 2024-12-14 DIAGNOSIS — R93.89 THICKENED ENDOMETRIUM: ICD-10-CM

## 2024-12-14 DIAGNOSIS — N95.0 POSTMENOPAUSAL BLEEDING: Primary | ICD-10-CM

## 2024-12-14 DIAGNOSIS — D21.9 FIBROID: ICD-10-CM

## 2024-12-14 PROCEDURE — 58100 BIOPSY OF UTERUS LINING: CPT | Performed by: OBSTETRICS & GYNECOLOGY

## 2024-12-14 PROCEDURE — 99203 OFFICE O/P NEW LOW 30 MIN: CPT | Performed by: OBSTETRICS & GYNECOLOGY

## 2024-12-14 NOTE — PROCEDURES
Endometrial Biopsy     Pre-Procedure Care:   Consent was obtained.  Procedure/risks were explained.  Questions were answered.  Correct patient was identified.  Correct side and site were confirmed.      Birth control method(s) used:      Indication:  bleeding, thickened endometrial stripe    Pre-Medications:    The patient was premedicated with  nothing .    Description of Procedure:   A bivalve speculum was placed in the vagina and the cervix was prepped with betadine solution.   Single tooth tenaculum placed at the 12 o'clock position.   The uterine cavity was sounded at 8 cm.   The endometrial cavity was curetted for pipelle tissue sampling with 2 passes.  Specimen was sent to pathology.   The single tooth tenaculum was removed.   Silver nitrate was applied at the site of tenaculum application   Good hemostasis was noted.  There were no complications.    There was no blood loss.      Discharge instructions were provided to the patient.    Visit Plan:  Await results

## 2024-12-14 NOTE — PROGRESS NOTES
Lenin Rodriguez is a 59 year old female  Patient's last menstrual period was 2015.   Chief Complaint   Patient presents with    Consult     DISCUSS ultrasound RESULTS -- new pt. Last seen . Getting paps by PCP. Had spotting few months ago & therefore had ultrasound ordered. Getting knee surgery soon. Has been experiencing RLQ pain w/ neg GI eval   .    OBSTETRICS HISTORY:  OB History    Para Term  AB Living   5 4 4 0 1 4   SAB IAB Ectopic Multiple Live Births   1 0 0 0 4       GYNE HISTORY:  Periods none due to menopause    History   Sexual Activity    Sexual activity: Not on file       MEDICAL HISTORY:  Past Medical History:    Anemia    Borderline diabetes    Chronic rhinitis    Dysuria    Esophageal reflux    High blood pressure    History of eye injury    OS, Eye injury from curtain kiki    Osteoarthritis    Palpitations    PMB (postmenopausal bleeding)    Prediabetes    Routine physical examination    Sleep apnea    cpap use daily     Past Surgical History:   Procedure Laterality Date    Colonoscopy      Colonoscopy      Egd      Saleem localization wire 1 site right (cpt=19281)      Needle biopsy left      Other  2021    hysteroscopic polypectomy    Other surgical history Bilateral     fine needle breast biopsy - benign    Stress test scan      Upper gi endoscopy,exam       OB History    Para Term  AB Living   5 4 4 0 1 4   SAB IAB Ectopic Multiple Live Births   1 0 0 0 4        SOCIAL HISTORY:  Social History     Socioeconomic History    Marital status:      Spouse name: Not on file    Number of children: Not on file    Years of education: Not on file    Highest education level: Not on file   Occupational History    Not on file   Tobacco Use    Smoking status: Never    Smokeless tobacco: Never   Vaping Use    Vaping status: Never Used   Substance and Sexual Activity    Alcohol use: No     Alcohol/week: 0.0 standard drinks of alcohol    Drug use: No     Sexual activity: Not on file   Other Topics Concern     Service Not Asked    Blood Transfusions Not Asked    Caffeine Concern Yes     Comment: 1 cup Coffee    Occupational Exposure Not Asked    Hobby Hazards Not Asked    Sleep Concern Not Asked    Stress Concern Not Asked    Weight Concern Not Asked    Special Diet Not Asked    Back Care Not Asked    Exercise Not Asked    Bike Helmet Not Asked    Seat Belt Not Asked    Self-Exams Not Asked    Left Handed Not Asked    Right Handed Yes    Currently spends a great deal of time in the sun Not Asked    Past Sunlamp Treatments for Acne Not Asked    History of tanning Not Asked    Hx of Spending Great Deal of Time in Sun Not Asked    Bad sunburns in the past Not Asked    Tanning Salons in the Past Not Asked    Hx of Radiation Treatments Not Asked    Regular use of sun block Not Asked   Social History Narrative    Not on file     Social Drivers of Health     Financial Resource Strain: Not on file   Food Insecurity: Not on file   Transportation Needs: Not on file   Physical Activity: Not on file   Stress: Not on file   Social Connections: Not on file   Housing Stability: Not on file       MEDICATIONS:    Current Outpatient Medications:     Thiamine HCl (VITAMIN B-1 OR), Take by mouth., Disp: , Rfl:     magnesium 30 MG Oral Tab, Take 1 tablet (30 mg total) by mouth 2 (two) times daily., Disp: , Rfl:     Potassium Chloride ER 20 MEQ Oral Tab CR, Take 20 mEq by mouth 2 (two) times daily., Disp: 4 tablet, Rfl: 0    EPINEPHrine (EPIPEN 2-MARLENE) 0.3 MG/0.3ML Injection Solution Auto-injector, Inject IM in event of  allergic reaction, Disp: 1 each, Rfl: 0    naproxen 500 MG Oral Tab, Take 1 tablet (500 mg total) by mouth 2 (two) times daily with meals. Take with food. Stop if stomach upset., Disp: 60 tablet, Rfl: 0    montelukast 10 MG Oral Tab, Take 1 tablet by mouth 1 time each day in the evening., Disp: 90 tablet, Rfl: 3    carvedilol 12.5 MG Oral Tab, Take 1 tablet (12.5  mg total) by mouth 2 (two) times daily with meals., Disp: 180 tablet, Rfl: 3    Omeprazole 40 MG Oral Capsule Delayed Release, Take 1 capsule (40 mg total) by mouth Q12H., Disp: 180 capsule, Rfl: 3    levocetirizine 5 MG Oral Tab, TAKE 1 TABLET BY MOUTH ONCE DAILY IN THE EVENING, Disp: 90 tablet, Rfl: 3    losartan-hydroCHLOROthiazide 100-25 MG Oral Tab, Take 1 tablet by mouth daily., Disp: 90 tablet, Rfl: 3    azelastine 0.1 % Nasal Solution, 1 spray by Nasal route 2 (two) times daily., Disp: 3 each, Rfl: 1    MECLIZINE 25 MG Oral Tab, Take 1 tablet by mouth 3  times daily as needed., Disp: 20 tablet, Rfl: 0    Meloxicam 15 MG Oral Tab, TAKE 1/2 - 1 tablet BY MOUTH ONCE DAILY prn, Disp: 30 tablet, Rfl: 0    Diclofenac Sodium 1 % Transdermal Gel, APPLY TO THE AFFECTED AREA TWICE DAILY AS NEEDED, Disp: 100 g, Rfl: 1    Mometasone Furoate 0.1 % External Cream, External application 2 times daily as directed., Disp: 45 g, Rfl: 0    ALLERGIES:  Allergies[1]      Review of Systems:  Constitutional:    denies fatigue, night sweats, hot flashes  Gastrointestinal:  denies abdominal pain, diarrhea or constipation  Genitourinary:    denies dysuria, abnormal vaginal discharge, vaginal itching  Musculoskeletal:   denies back pain.  Neurological:    denies headaches, extremity weakness or numbness.  Psychiatric:   denies depression or anxiety.        PHYSICAL EXAM:   /86   Pulse 66   Wt 194 lb 6.4 oz (88.2 kg)   LMP 05/08/2015   BMI 34.44 kg/m²   Constitutional:  well developed, well nourished  Head/Face: normocephalic  Psychiatric:   oriented to time, place, person and situation. Appropriate mood and affect    US PELVIS (TRANSABDOMINAL AND TRANSVAGINAL) (CPT=76856/04442)    Result Date: 10/24/2024  PROCEDURE: US PELVIS TRANSABDOMINAL AND TRANSVAGINAL (CPT=76856/79542)  COMPARISON: [facility],  PELVIS W EV (CPT=76856/76331), 6/09/2022, 3:44 PM.  INDICATIONS: Bloody vaginal discharge   TECHNIQUE: Pelvic ultrasound using transabdominal and transvaginal technique.  A transvaginal scan was performed for bloody vaginal discharge.  FINDINGS:   UTERUS:   Size is 8.2 x 3.9 x 5.8 cm.  The uterine volume is 98 mL   ENDOMETRIUM: Endometrial thickness is 8 mm.  No fluid or mass in the endometrial canal.  MYOMETRIUM: There is a 1.2 x 0.9 x 1.3 cm posterior intramural uterine fibroid, which previously measured 1.1 x 0.8 x 1.0 cm on the prior ultrasound dated 06/10/2022.  There is a 1.4 x 1.1 x 1.2 cm posterior intramural uterine fibroid, which previously measured 1.9 x 0.8 x 1.5 cm on the prior study.  There is a 2.7 x 2.2 x 2.5 cm partially calcified intramural uterine fibroid with endometrial contact, which previously measured 2.2 x 1.9 x 2.1 cm.  OVARIES AND ADNEXA:   RIGHT:   The right ovary measures 2.1 x 1.0 x 1.6 cm.  Normal appearance with no masses.  LEFT:   The left ovary is not visualized.  CUL-DE-SAC:   Normal.  No free fluid or mass.  OTHER: Negative.  Bladder appears normal.          CONCLUSION:    1. Multiple intramural uterine fibroids, which measure up to 2.7 cm.  The dominant fibroid demonstrates endometrial contact and has mildly increased in size when compared to 06/09/2022. 2. Endometrial thickness of 8 mm. 3. Unremarkable appearance of the right ovary.  The left ovary is not visualized.    Dictated by (CST): Tim Pineda MD on 10/24/2024 at 10:03 AM     Finalized by (CST): Tim Pineda MD on 10/24/2024 at 10:07 AM            Recent Labs     05/19/22  1227 07/29/24  1005 11/20/24  1340   RBC 4.23 3.98 4.27   HGB 11.9* 11.3* 12.2   HCT 37.9 34.5* 39.4   MCV 89.6 86.7 92.3   MCH 28.1 28.4 28.6   MCHC 31.4 32.8 31.0   RDW 14.7 14.9 15.7*   NEPRELIM 2.27 3.68 4.21   WBC 4.9 7.0 6.5   .0 274.0 262.0         Recent Labs     05/19/22  1227 07/29/24  1005 12/04/24  1221   TSH 2.430 3.335 2.906       Assessment & Plan:    Lenin was seen today for consult.    Diagnoses and all orders  for this visit:    Postmenopausal bleeding  -     US PELVIS W EV (CPT=76856/24021); Future    Fibroid  -     US PELVIS W EV (CPT=76856/88024); Future    Thickened endometrium        Requested Prescriptions      No prescriptions requested or ordered in this encounter       Enlarging fibroids concerning but could be due to technical differences. Plan to repeat ultrasound again in one month. If continued growth, needs hysterectomy.  Endometrial stripe 8 mm -- needs endometrial biopsy -- agrees to get done today  Follow up in one month to review new ultrasound result.    Spent total time 30 minutes on obtaining history / chart review, evaluating patient / performing medically appropriate exam, discussing treatment options, counseling / educating, and completing documentation, coordinating care.                 [1]   Allergies  Allergen Reactions    Sulfa Antibiotics RASH, SWELLING and SHORTNESS OF BREATH    Amoxicillin FACE FLUSHING     No skin peeling or blistering or organ involvement     Lisinopril Coughing    Rash Away  [Sensi-Care Protective Barrier] RASH    Seasonal OTHER (SEE COMMENTS)     Runny nose, itchy eyes, congestion     Shrimp ANAPHYLAXIS

## 2024-12-16 NOTE — DISCHARGE INSTRUCTIONS
HOME INSTRUCTIONS  Remove dressings on Thursday, leave steri strips in place.    Ice for 20 minutes at a time and elevate as needed.    Crutches as needed.  Weightbearing as tolerated.       Discharge Instructions for Knee Arthroscopy  You had knee arthroscopy. This procedure uses small cuts (incisions) to find, identify, and treat problems inside the knee. These problems include loose bodies, meniscal tears, bone spurs, osteochondritis dissecans (OCD), and synovitis. Below are tips to help speed your recovery from surgery.   Activity  Don’t drive until your healthcare provider says it’s OK. And never drive while taking opioid pain medicine.  Remember to take pain medicines as directed. Don’t wait for the pain to get bad. And don't drink alcohol while taking pain medicines.  Follow weight-bearing instructions given by your healthcare provider. They may require you to use crutches to keep weight off your knee.  Unless your healthcare provider tells you otherwise, start using the affected knee 3 days after surgery.  Slowly bend and straighten your affected leg as far as you can, unless your provider tells you otherwise. Do this several times a day.  Rest your knee by lying down and putting pillows under it for the first  3 days after surgery. Keep your ankle elevated above the level of your heart. This helps keep swelling down.  Follow your provider’s instructions about wearing and caring for a brace, immobilizer, or elastic dressing.  Point and flex your foot and rotate your ankle as much as possible during the first few weeks after surgery. Also wiggle your toes as much as possible.     Incision care  Check your incision daily for redness, tenderness, or drainage.  Don’t be alarmed if there is some bruising, slight swelling of the knee, or a small amount of blood on the bandage.  Adjust the bandage or brace as needed. It should feel supportive on your knee, but not too tight.   Don’t soak your incision in water (no  hot tubs, bathtubs, swimming pools) until your healthcare provider says it’s OK.  Wait  2 days after your surgery to start showering. Then shower as needed. Cover your knee with plastic and seal with tape to keep the dressing or brace dry. Once your dressing is removed, follow your healthcare provider’s instructions for care of the wound. And sit on a shower stool so that you don’t fall while showering.  Use an ice pack or bag of frozen peas--or something similar--wrapped in a thin towel to reduce the swelling. Keep the foot elevated while you ice the knee. Apply the ice pack for  20 minutes. Then remove it for  20 minutes. Repeat as needed. Icing helps reduce swelling.     Other precautions  Arrange your household to keep the items you need within reach.  Remove throw rugs, electrical cords, and anything else that may cause you to fall.  Use nonslip bath mats, grab bars, an elevated toilet seat, and a shower chair in your bathroom.  Use a cane, crutches, a walker, or handrails until your balance, flexibility, and strength improve, and you can put weight on your leg. And remember to ask for help from others when you need it.  Free up your hands so that you can use them to keep balance. Use a sana pack, apron, or pockets to carry things.     Follow-up  Make a follow-up appointment as directed by your healthcare provider.   Call 911  Call 911 right away if you have any of the following:   Chest pain  Shortness of breath  Severe nausea or vomiting  When to call your healthcare provider  Call your healthcare provider right away if you have any of the following:   Pain that is not relieved by medicine or rest  Calf pain, redness, or swelling  Continued bleeding through the bandage  Tingling, numbness, or coldness in your foot or leg  Fever of  100.4° F ( 38°C) or higher, or as directed by your healthcare provider  Shaking chills  Excessive swelling, increased redness, or any drainage around the incision  Swelling,  tenderness, or pain in your leg  Hi last reviewed this educational content on 11/1/2021  © 3619-2495 [employer]. All rights reserved. This information is not intended as a substitute for professional medical care. Always follow your healthcare professional's instructions.    AMBSURG HOME CARE INSTRUCTIONS: POST-OP ANESTHESIA  The medication that you received for sedation or general anesthesia can last up to 24 hours. Your judgment and reflexes may be altered, even if you feel like your normal self.      We Recommend:   Do not drive any motor vehicle or bicycle   Avoid mowing the lawn, playing sports, or working with power tools/applicances (power saws, electric knives or mixers)   That you have someone stay with you on your first night home   Do not drink alcohol or take sleeping pills or tranquilizers   Do not sign legal documents within 24 hours of your procedure   If you had a nerve block for your surgery, take extra care not to put any pressure on your arm or hand for 24 hours    It is normal:  For you to have a sore throat if you had a breathing tube during surgery (while you were asleep!). The sore throat should get better within 48 hours. You can gargle with warm salt water (1/2 tsp in 4 oz warm water) or use a throat lozenge for comfort  To feel muscle aches or soreness especially in the abdomen, chest or neck. The achy feeling should go away in the next 24 hours  To feel weak, sleepy or \"wiped out\". Your should start feeling better in the next 24 hours.   To experience mild discomforts such as sore lip or tongue, headache, cramps, gas pains or a bloated feeling in your abdomen.   To experience mild back pain or soreness for a day or two if you had spinal or epidural anesthesia.   If you had laparoscopic surgery, to feel shoulder pain or discomfort on the day of surgery.   For some patients to have nausea after surgery/anesthesia    If you feel nausea or experience vomiting:   Try to  move around less.   Eat less than usual or drink only liquids until the next morning   Nausea should resolve in about 24 hours    If you have a problem when you are at home:    Call your surgeons office   Discharge Instructions: After Your Surgery  You’ve just had surgery. During surgery, you were given medicine called anesthesia to keep you relaxed and free of pain. After surgery, you may have some pain or nausea. This is common. Here are some tips for feeling better and getting well after surgery.   Going home  Your healthcare provider will show you how to take care of yourself when you go home. They'll also answer your questions. Have an adult family member or friend drive you home. For the first 24 hours after your surgery:   Don't drive or use heavy equipment.  Don't make important decisions or sign legal papers.  Take medicines as directed.  Don't drink alcohol.  Have someone stay with you, if needed. They can watch for problems and help keep you safe.  Be sure to go to all follow-up visits with your healthcare provider. And rest after your surgery for as long as your provider tells you to.   Coping with pain  If you have pain after surgery, pain medicine will help you feel better. Take it as directed, before pain becomes severe. Also, ask your healthcare provider or pharmacist about other ways to control pain. This might be with heat, ice, or relaxation. And follow any other instructions your surgeon or nurse gives you.      Stay on schedule with your medicine.     Tips for taking pain medicine  To get the best relief possible, remember these points:   Pain medicines can upset your stomach. Taking them with a little food may help.  Most pain relievers taken by mouth need at least 20 to 30 minutes to start to work.  Don't wait till your pain becomes severe before you take your medicine. Try to time your medicine so that you can take it before starting an activity. This might be before you get dressed, go for a  walk, or sit down for dinner.  Constipation is a common side effect of some pain medicines. Call your healthcare provider before taking any medicines such as laxatives or stool softeners to help ease constipation. Also ask if you should skip any foods. Drinking lots of fluids and eating foods such as fruits and vegetables that are high in fiber can also help. Remember, don't take laxatives unless your surgeon has prescribed them.  Drinking alcohol and taking pain medicine can cause dizziness and slow your breathing. It can even be deadly. Don't drink alcohol while taking pain medicine.  Pain medicine can make you react more slowly to things. Don't drive or run machinery while taking pain medicine.  Your healthcare provider may tell you to take acetaminophen to help ease your pain. Ask them how much you're supposed to take each day. Acetaminophen or other pain relievers may interact with your prescription medicines or other over-the-counter (OTC) medicines. Some prescription medicines have acetaminophen and other ingredients in them. Using both prescription and OTC acetaminophen for pain can cause you to accidentally overdose. Read the labels on your OTC medicines with care. This will help you to clearly know the list of ingredients, how much to take, and any warnings. It may also help you not take too much acetaminophen. If you have questions or don't understand the information, ask your pharmacist or healthcare provider to explain it to you before you take the OTC medicine.   Managing nausea  Some people have an upset stomach (nausea) after surgery. This is often because of anesthesia, pain, or pain medicine, less movement of food in the stomach, or the stress of surgery. These tips will help you handle nausea and eat healthy foods as you get better. If you were on a special food plan before surgery, ask your healthcare provider if you should follow it while you get better. Check with your provider on how your  eating should progress. It may depend on the surgery you had. These general tips may help:   Don't push yourself to eat. Your body will tell you when to eat and how much.  Start off with clear liquids and soup. They're easier to digest.  Next try semi-solid foods as you feel ready. These include mashed potatoes, applesauce, and gelatin.  Slowly move to solid foods. Don’t eat fatty, rich, or spicy foods at first.  Don't force yourself to have 3 large meals a day. Instead eat smaller amounts more often.  Take pain medicines with a small amount of solid food, such as crackers or toast. This helps prevent nausea.  When to call your healthcare provider  Call your healthcare provider right away if you have any of these:   You still have too much pain, or the pain gets worse, after taking the medicine. The medicine may not be strong enough. Or there may be a complication from the surgery.  You feel too sleepy, dizzy, or groggy. The medicine may be too strong.  Side effects such as nausea or vomiting. Your healthcare provider may advise taking other medicines to .  Skin changes such as rash, itching, or hives. This may mean you have an allergic reaction. Your provider may advise taking other medicines.  The incision looks different (for instance, part of it opens up).  Bleeding or fluid leaking from the incision site, and weren't told to expect that.  Fever of 100.4°F (38°C) or higher, or as directed by your provider.  Call 911  Call 911 right away if you have:   Trouble breathing  Facial swelling    If you have obstructive sleep apnea   You were given anesthesia medicine during surgery to keep you comfortable and free of pain. After surgery, you may have more apnea spells because of this medicine and other medicines you were given. The spells may last longer than normal.    At home:  Keep using the continuous positive airway pressure (CPAP) device when you sleep. Unless your healthcare provider tells you not to, use it  when you sleep, day or night. CPAP is a common device used to treat obstructive sleep apnea.  Talk with your provider before taking any pain medicine, muscle relaxants, or sedatives. Your provider will tell you about the possible dangers of taking these medicines.  Contact your provider if your sleeping changes a lot even when taking medicines as directed.  Hi last reviewed this educational content on 10/1/2021  © 6819-1457 [employer]. All rights reserved. This information is not intended as a substitute for professional medical care. Always follow your healthcare professional's instructions.

## 2024-12-16 NOTE — H&P
[facility]  part of [facility]    History & Physical    Lenin Rodriguez Patient Status:  Hospital Outpatient Surgery    8/15/1965 MRN [id number]   Location [facility] OPERATING ROOM Attending Alex Cruz, *   Hosp Day # 0 PCP JAYNE ARREOLA MD     Date:  2024    History provided by:patient  Chief Complaint:   Right knee pain    HPI:   Lenin Rodriguez is a(n) 59 year old female. She presents  with complaints of persistent pain in the right knee with symptoms of locking and medial joint line pain.  She had temporary relief following a cortisone injection, but her symptoms have recurred.     History     Past Medical History:    Anemia    Borderline diabetes    Chronic rhinitis    Dysuria    Esophageal reflux    High blood pressure    History of eye injury    OS, Eye injury from curtain kiki    Osteoarthritis    Palpitations    PMB (postmenopausal bleeding)    Prediabetes    Routine physical examination    Sleep apnea    cpap use daily     Past Surgical History:   Procedure Laterality Date    Colonoscopy      Colonoscopy      Egd      Saleem localization wire 1 site right (cpt=19281)      Needle biopsy left      Other  2021    hysteroscopic polypectomy    Other surgical history Bilateral     fine needle breast biopsy - benign    Stress test scan      Upper gi endoscopy,exam       Family History   Problem Relation Age of Onset    Hypertension Father     Lipids Father     Thyroid disease Father     Hypertension Mother     Lipids Mother     Thyroid disease Mother     Diabetes Maternal Grandmother     Diabetes Maternal Grandfather     Thyroid disease Sister     Thyroid disease Brother     Macular degeneration Neg     Glaucoma Neg      Social History:  Social History     Socioeconomic History    Marital status:    Tobacco Use    Smoking status: Never    Smokeless tobacco: Never   Vaping Use    Vaping status: Never Used   Substance and Sexual Activity    Alcohol  use: No     Alcohol/week: 0.0 standard drinks of alcohol    Drug use: No   Other Topics Concern    Caffeine Concern Yes     Comment: 1 cup Coffee    Right Handed Yes     Allergies/Medications:   Allergies: Allergies[1]  No medications prior to admission.       Review of Systems:   Pertinent items are noted in HPI.    Physical Exam:   Vital Signs:  Height 5' 3\" (1.6 m), weight 185 lb (83.9 kg), last menstrual period 05/08/2015, not currently breastfeeding.     General appearance: alert, appears stated age and cooperative  Extremities: Effusion of the right knee. Medial joint line tenderness.   Pulses: 2+ and symmetric  Neurologic: Alert and oriented X 3, normal strength and tone. Normal symmetric reflexes. Normal coordination and gait.  Positive Antonio's test       Results:     Lab Results   Component Value Date    WBC 6.5 11/20/2024    HGB 12.2 11/20/2024    HCT 39.4 11/20/2024    .0 11/20/2024    CREATSERUM 0.75 11/20/2024    BUN 12 11/20/2024     11/20/2024    K 4.3 12/04/2024     11/20/2024    CO2 31.0 11/20/2024     (H) 11/20/2024    CA 10.1 11/20/2024    ALB 4.4 11/20/2024    ALKPHO 78 11/20/2024    BILT 0.5 11/20/2024    TP 8.0 11/20/2024    AST 14 11/20/2024    ALT 13 11/20/2024    T4F 0.89 11/30/2011    TSH 2.906 12/04/2024    ESRML 41 (H) 11/04/2021    CRP 0.85 (H) 11/04/2021    B12 >2,000 (H) 05/19/2022       No results found.        Assessment/Plan:     She has minimal degenerative change on her x-ray and a complex tear of the posterior horn of the medial meniscus extending to the root with symptoms of locking and pain.  We reviewed the treatment options including both nonsurgical and surgical treatment.  We talked about meniscal root repair versus meniscectomy the prognosis for each as well as the anticipated recovery.  We talked about the fact that sometimes a complex meniscal tear involving the root is the precursor of rapidly developing arthritis that could result in the  need for knee replacement in the short-term future.  She understands and would like to proceed with arthroscopy and partial medial meniscectomy.      LORE CONNELL PA-C  12/16/2024       [1]   Allergies  Allergen Reactions    Sulfa Antibiotics RASH, SWELLING and SHORTNESS OF BREATH    Amoxicillin FACE FLUSHING     No skin peeling or blistering or organ involvement     Lisinopril Coughing    Rash Away  [Sensi-Care Protective Barrier] RASH    Seasonal OTHER (SEE COMMENTS)     Runny nose, itchy eyes, congestion     Shrimp ANAPHYLAXIS

## 2024-12-17 ENCOUNTER — ANESTHESIA EVENT (OUTPATIENT)
Dept: SURGERY | Facility: HOSPITAL | Age: 59
End: 2024-12-17
Payer: COMMERCIAL

## 2024-12-17 ENCOUNTER — ANESTHESIA (OUTPATIENT)
Dept: SURGERY | Facility: HOSPITAL | Age: 59
End: 2024-12-17
Payer: COMMERCIAL

## 2024-12-17 ENCOUNTER — HOSPITAL ENCOUNTER (OUTPATIENT)
Facility: HOSPITAL | Age: 59
Setting detail: HOSPITAL OUTPATIENT SURGERY
Discharge: HOME | End: 2024-12-17
Attending: ORTHOPAEDIC SURGERY | Admitting: ORTHOPAEDIC SURGERY
Payer: COMMERCIAL

## 2024-12-17 VITALS
DIASTOLIC BLOOD PRESSURE: 76 MMHG | OXYGEN SATURATION: 96 % | WEIGHT: 189 LBS | HEIGHT: 63.5 IN | HEART RATE: 62 BPM | SYSTOLIC BLOOD PRESSURE: 162 MMHG | TEMPERATURE: 98 F | BODY MASS INDEX: 33.07 KG/M2 | RESPIRATION RATE: 16 BRPM

## 2024-12-17 DIAGNOSIS — G89.18 POST-OP PAIN: Primary | ICD-10-CM

## 2024-12-17 LAB — GLUCOSE BLDC GLUCOMTR-MCNC: 136 MG/DL (ref 70–99)

## 2024-12-17 PROCEDURE — 0RJJ4ZZ INSPECTION OF RIGHT SHOULDER JOINT, PERCUTANEOUS ENDOSCOPIC APPROACH: ICD-10-PCS | Performed by: ORTHOPAEDIC SURGERY

## 2024-12-17 PROCEDURE — 82962 GLUCOSE BLOOD TEST: CPT

## 2024-12-17 RX ORDER — DEXAMETHASONE SODIUM PHOSPHATE 4 MG/ML
VIAL (ML) INJECTION AS NEEDED
Status: DISCONTINUED | OUTPATIENT
Start: 2024-12-17 | End: 2024-12-17 | Stop reason: SURG

## 2024-12-17 RX ORDER — METOPROLOL TARTRATE 25 MG/1
25 TABLET, FILM COATED ORAL ONCE AS NEEDED
Status: DISCONTINUED | OUTPATIENT
Start: 2024-12-17 | End: 2024-12-17 | Stop reason: HOSPADM

## 2024-12-17 RX ORDER — METOCLOPRAMIDE HYDROCHLORIDE 5 MG/ML
10 INJECTION INTRAMUSCULAR; INTRAVENOUS ONCE
Status: COMPLETED | OUTPATIENT
Start: 2024-12-17 | End: 2024-12-17

## 2024-12-17 RX ORDER — PROCHLORPERAZINE EDISYLATE 5 MG/ML
5 INJECTION INTRAMUSCULAR; INTRAVENOUS EVERY 8 HOURS PRN
Status: DISCONTINUED | OUTPATIENT
Start: 2024-12-17 | End: 2024-12-17

## 2024-12-17 RX ORDER — NICOTINE POLACRILEX 4 MG
15 LOZENGE BUCCAL
Status: DISCONTINUED | OUTPATIENT
Start: 2024-12-17 | End: 2024-12-17

## 2024-12-17 RX ORDER — FAMOTIDINE 10 MG/ML
20 INJECTION, SOLUTION INTRAVENOUS ONCE
Status: DISCONTINUED | OUTPATIENT
Start: 2024-12-17 | End: 2024-12-17 | Stop reason: HOSPADM

## 2024-12-17 RX ORDER — SODIUM CHLORIDE, SODIUM LACTATE, POTASSIUM CHLORIDE, CALCIUM CHLORIDE 600; 310; 30; 20 MG/100ML; MG/100ML; MG/100ML; MG/100ML
INJECTION, SOLUTION INTRAVENOUS CONTINUOUS
Status: DISCONTINUED | OUTPATIENT
Start: 2024-12-17 | End: 2024-12-17

## 2024-12-17 RX ORDER — NALOXONE HYDROCHLORIDE 0.4 MG/ML
0.08 INJECTION, SOLUTION INTRAMUSCULAR; INTRAVENOUS; SUBCUTANEOUS AS NEEDED
Status: DISCONTINUED | OUTPATIENT
Start: 2024-12-17 | End: 2024-12-17

## 2024-12-17 RX ORDER — ONDANSETRON 2 MG/ML
4 INJECTION INTRAMUSCULAR; INTRAVENOUS EVERY 6 HOURS PRN
Status: DISCONTINUED | OUTPATIENT
Start: 2024-12-17 | End: 2024-12-17

## 2024-12-17 RX ORDER — HYDROCODONE BITARTRATE AND ACETAMINOPHEN 5; 325 MG/1; MG/1
1 TABLET ORAL EVERY 4 HOURS PRN
Qty: 20 TABLET | Refills: 0 | Status: SHIPPED | OUTPATIENT
Start: 2024-12-17

## 2024-12-17 RX ORDER — ACETAMINOPHEN 500 MG
1000 TABLET ORAL ONCE
Status: COMPLETED | OUTPATIENT
Start: 2024-12-17 | End: 2024-12-17

## 2024-12-17 RX ORDER — HYDROMORPHONE HYDROCHLORIDE 1 MG/ML
0.2 INJECTION, SOLUTION INTRAMUSCULAR; INTRAVENOUS; SUBCUTANEOUS EVERY 5 MIN PRN
Status: DISCONTINUED | OUTPATIENT
Start: 2024-12-17 | End: 2024-12-17

## 2024-12-17 RX ORDER — NICOTINE POLACRILEX 4 MG
30 LOZENGE BUCCAL
Status: DISCONTINUED | OUTPATIENT
Start: 2024-12-17 | End: 2024-12-17

## 2024-12-17 RX ORDER — LIDOCAINE HYDROCHLORIDE 20 MG/ML
INJECTION, SOLUTION EPIDURAL; INFILTRATION; INTRACAUDAL; PERINEURAL AS NEEDED
Status: DISCONTINUED | OUTPATIENT
Start: 2024-12-17 | End: 2024-12-17 | Stop reason: SURG

## 2024-12-17 RX ORDER — HYDROCODONE BITARTRATE AND ACETAMINOPHEN 5; 325 MG/1; MG/1
1 TABLET ORAL EVERY 4 HOURS PRN
Status: DISCONTINUED | OUTPATIENT
Start: 2024-12-17 | End: 2024-12-17

## 2024-12-17 RX ORDER — MIDAZOLAM HYDROCHLORIDE 1 MG/ML
INJECTION INTRAMUSCULAR; INTRAVENOUS AS NEEDED
Status: DISCONTINUED | OUTPATIENT
Start: 2024-12-17 | End: 2024-12-17 | Stop reason: SURG

## 2024-12-17 RX ORDER — ONDANSETRON 2 MG/ML
INJECTION INTRAMUSCULAR; INTRAVENOUS AS NEEDED
Status: DISCONTINUED | OUTPATIENT
Start: 2024-12-17 | End: 2024-12-17 | Stop reason: SURG

## 2024-12-17 RX ORDER — DEXTROSE MONOHYDRATE 25 G/50ML
50 INJECTION, SOLUTION INTRAVENOUS
Status: DISCONTINUED | OUTPATIENT
Start: 2024-12-17 | End: 2024-12-17

## 2024-12-17 RX ORDER — HYDROMORPHONE HYDROCHLORIDE 1 MG/ML
0.4 INJECTION, SOLUTION INTRAMUSCULAR; INTRAVENOUS; SUBCUTANEOUS EVERY 5 MIN PRN
Status: DISCONTINUED | OUTPATIENT
Start: 2024-12-17 | End: 2024-12-17

## 2024-12-17 RX ORDER — FAMOTIDINE 20 MG/1
20 TABLET, FILM COATED ORAL ONCE
Status: DISCONTINUED | OUTPATIENT
Start: 2024-12-17 | End: 2024-12-17 | Stop reason: HOSPADM

## 2024-12-17 RX ORDER — METOCLOPRAMIDE 10 MG/1
10 TABLET ORAL ONCE
Status: COMPLETED | OUTPATIENT
Start: 2024-12-17 | End: 2024-12-17

## 2024-12-17 RX ADMIN — ONDANSETRON 4 MG: 2 INJECTION INTRAMUSCULAR; INTRAVENOUS at 09:13:00

## 2024-12-17 RX ADMIN — LIDOCAINE HYDROCHLORIDE 40 MG: 20 INJECTION, SOLUTION EPIDURAL; INFILTRATION; INTRACAUDAL; PERINEURAL at 08:42:00

## 2024-12-17 RX ADMIN — DEXAMETHASONE SODIUM PHOSPHATE 8 MG: 4 MG/ML VIAL (ML) INJECTION at 08:42:00

## 2024-12-17 RX ADMIN — MIDAZOLAM HYDROCHLORIDE 2 MG: 1 INJECTION INTRAMUSCULAR; INTRAVENOUS at 08:38:00

## 2024-12-17 NOTE — ANESTHESIA PREPROCEDURE EVALUATION
Anesthesia PreOp Note    HPI:     Lenin Rodriguez is a 59 year old female with PMH of HTN, DHEERAJ on CPAP, BMI 33 who presents for below procedure with Alex Cruz MD.    Date of Surgery: 12/17/2024    Procedure(s):  Right knee arthroscopy  Indication: Acute medial meniscus tear of right knee, subsequent encounter [N59.683D]    Relevant Problems   No relevant active problems       NPO:  Last Liquid Consumption Date: 12/16/24  Last Liquid Consumption Time: 2359  Last Solid Consumption Date: 12/16/24  Last Solid Consumption Time: 2359  Last Liquid Consumption Date: 12/16/24          History Review:  Patient Active Problem List    Diagnosis Date Noted    Age-related nuclear cataract of both eyes 06/14/2022    Flat feet, bilateral 01/05/2021    Vertigo 07/12/2018    Intramural and subserous leiomyoma of uterus 07/12/2018    Seasonal allergic rhinitis due to pollen 07/12/2018    Cervicalgia 12/07/2017    Esophageal spasm 12/07/2017    Diet-controlled diabetes mellitus (HCC) 12/07/2017    Vitamin D deficiency 12/07/2017    Other chronic sinusitis 10/19/2017    Cellulitis of left index finger 10/19/2017    Elevated blood sugar 10/19/2017    Dermatitis, eczematoid 10/19/2017    Vitreous floaters of left eye 12/06/2016    Anemia 03/21/2016    GERD without esophagitis 03/21/2016    Hypertension 09/15/2014    Chronic pain of left knee 09/15/2014       Past Medical History:    Anemia    Borderline diabetes    Chronic rhinitis    Dysuria    Esophageal reflux    High blood pressure    History of eye injury    OS, Eye injury from curtain kiki    Osteoarthritis    Palpitations    PMB (postmenopausal bleeding)    Prediabetes    Routine physical examination    Sleep apnea    cpap use daily       Past Surgical History:   Procedure Laterality Date    Colonoscopy      Colonoscopy      Egd      Saleem localization wire 1 site right (cpt=19281)      Needle biopsy left      Other  05/2021    hysteroscopic polypectomy    Other  surgical history Bilateral     fine needle breast biopsy - benign    Stress test scan      Upper gi endoscopy,exam         Prescriptions Prior to Admission[1]  Current Medications and Prescriptions Ordered in Epic[2]    Allergies[3]    Family History   Problem Relation Age of Onset    Hypertension Father     Lipids Father     Thyroid disease Father     Hypertension Mother     Lipids Mother     Thyroid disease Mother     Diabetes Maternal Grandmother     Diabetes Maternal Grandfather     Thyroid disease Sister     Thyroid disease Brother     Macular degeneration Neg     Glaucoma Neg      Social History     Socioeconomic History    Marital status:    Tobacco Use    Smoking status: Never    Smokeless tobacco: Never   Vaping Use    Vaping status: Never Used   Substance and Sexual Activity    Alcohol use: No     Alcohol/week: 0.0 standard drinks of alcohol    Drug use: No   Other Topics Concern    Caffeine Concern Yes     Comment: 1 cup Coffee    Right Handed Yes       Available pre-op labs reviewed.  Lab Results   Component Value Date    WBC 6.5 11/20/2024    RBC 4.27 11/20/2024    HGB 12.2 11/20/2024    HCT 39.4 11/20/2024    MCV 92.3 11/20/2024    MCH 28.6 11/20/2024    MCHC 31.0 11/20/2024    RDW 15.7 (H) 11/20/2024    .0 11/20/2024     Lab Results   Component Value Date     11/20/2024    K 4.3 12/04/2024     11/20/2024    CO2 31.0 11/20/2024    BUN 12 11/20/2024    CREATSERUM 0.75 11/20/2024     (H) 11/20/2024    PGLU 136 (H) 12/17/2024    CA 10.1 11/20/2024          Vital Signs:  Body mass index is 32.95 kg/m².   height is 1.613 m (5' 3.5\") and weight is 85.7 kg (189 lb). Her oral temperature is 98 °F (36.7 °C). Her blood pressure is 159/76 and her pulse is 68. Her respiration is 20 and oxygen saturation is 97%.   Vitals:    12/12/24 0914 12/17/24 0723   BP:  159/76   Pulse:  68   Resp:  20   Temp:  98 °F (36.7 °C)   TempSrc:  Oral   SpO2:  97%   Weight: 83.9 kg (185 lb) 85.7 kg  (189 lb)   Height: 1.6 m (5' 3\") 1.613 m (5' 3.5\")        Anesthesia Evaluation      Airway   Mallampati: III  TM distance: >3 FB  Neck ROM: full  Dental - Dentition appears grossly intact     Pulmonary - normal exam   (+) sleep apnea on CPAP  Cardiovascular   Exercise tolerance: good  (+) hypertension    Rhythm: regular  Rate: normal    Neuro/Psych - negative ROS     GI/Hepatic/Renal    (+) GERD    Endo/Other    Abdominal                  Anesthesia Plan:   ASA:  3  Plan:   General  Airway:  LMA  Post-op Pain Management: IV analgesics  Informed Consent Plan and Risks Discussed With:  Patient  Use of Blood Products Discussed With:  Patient      I have informed Lenin Rodriguez and/or legal guardian or family member of the nature of the anesthetic plan, benefits, risks including possible dental damage if relevant, major complications, and any alternative forms of anesthetic management.   All of the patient's questions were answered to the best of my ability. The patient desires the anesthetic management as planned.  Jojo Pack MD  12/17/2024 7:45 AM         [1]   Facility-Administered Medications Prior to Admission   Medication Dose Route Frequency Provider Last Rate Last Admin    [COMPLETED] triamcinolone acetonide (Kenalog-40) 40 MG/ML injection 40 mg  40 mg Intra-articular Once Alex Cruz MD   40 mg at 10/23/24 1245     Medications Prior to Admission   Medication Sig Dispense Refill Last Dose/Taking    Thiamine HCl (VITAMIN B-1 OR) Take by mouth.   12/16/2024 Bedtime    magnesium 30 MG Oral Tab Take 1 tablet (30 mg total) by mouth 2 (two) times daily.   12/16/2024 Bedtime    Potassium Chloride ER 20 MEQ Oral Tab CR Take 20 mEq by mouth 2 (two) times daily. 4 tablet 0 12/16/2024 Morning    montelukast 10 MG Oral Tab Take 1 tablet by mouth 1 time each day in the evening. 90 tablet 3 12/16/2024 Bedtime    carvedilol 12.5 MG Oral Tab Take 1 tablet (12.5 mg total) by mouth 2 (two) times daily  with meals. 180 tablet 3 2024 at  6:00 AM    Omeprazole 40 MG Oral Capsule Delayed Release Take 1 capsule (40 mg total) by mouth Q12H. 180 capsule 3 2024 at  6:00 AM    levocetirizine 5 MG Oral Tab TAKE 1 TABLET BY MOUTH ONCE DAILY IN THE EVENING 90 tablet 3 2024 Bedtime    losartan-hydroCHLOROthiazide 100-25 MG Oral Tab Take 1 tablet by mouth daily. 90 tablet 3 2024 at 12:00 PM    azelastine 0.1 % Nasal Solution 1 spray by Nasal route 2 (two) times daily. 3 each 1 2024 Bedtime    MECLIZINE 25 MG Oral Tab Take 1 tablet by mouth 3  times daily as needed. 20 tablet 0 Past Month    Diclofenac Sodium 1 % Transdermal Gel APPLY TO THE AFFECTED AREA TWICE DAILY AS NEEDED 100 g 1 12/15/2024    EPINEPHrine (EPIPEN 2-MARLENE) 0.3 MG/0.3ML Injection Solution Auto-injector Inject IM in event of  allergic reaction 1 each 0 More than a month    [] sucralfate 1 g Oral Tab Take 1 tablet (1 g total) by mouth 4 (four) times daily before meals and nightly for 14 days. (Patient not taking: Reported on 2024) 56 tablet 0 Not Taking    naproxen 500 MG Oral Tab Take 1 tablet (500 mg total) by mouth 2 (two) times daily with meals. Take with food. Stop if stomach upset. 60 tablet 0 More than a month    Meloxicam 15 MG Oral Tab TAKE 1/2 - 1 tablet BY MOUTH ONCE DAILY prn 30 tablet 0 More than a month    Mometasone Furoate 0.1 % External Cream External application 2 times daily as directed. 45 g 0 Unknown   [2]   Current Facility-Administered Medications Ordered in Epic   Medication Dose Route Frequency Provider Last Rate Last Admin    lactated ringers infusion   Intravenous Continuous Alex Cruz MD 20 mL/hr at 24 0731 New Bag at 24 0731    metoprolol tartrate (Lopressor) tab 25 mg  25 mg Oral Once PRN Alex rCuz MD        famotidine (Pepcid) tab 20 mg  20 mg Oral Once Alex Cruz MD        Or    famotidine (Pepcid) 20 mg/2mL injection 20 mg  20 mg  Intravenous Once Alex Cruz MD        ceFAZolin (Ancef) 2g in 10mL IV syringe premix  2 g Intravenous Once Alex Cruz MD         No current Epic-ordered outpatient medications on file.   [3]   Allergies  Allergen Reactions    Shrimp ANAPHYLAXIS    Sulfa Antibiotics RASH, SWELLING and SHORTNESS OF BREATH    Amoxicillin FACE FLUSHING     No skin peeling or blistering or organ involvement     Seasonal OTHER (SEE COMMENTS)     Runny nose, itchy eyes, congestion     Lisinopril Coughing    Rash Away  [Sensi-Care Protective Barrier] RASH

## 2024-12-17 NOTE — ANESTHESIA POSTPROCEDURE EVALUATION
Patient: Lenin Rodriguez    Procedure Summary       Date: 12/17/24 Room / Location: [facility] MAIN OR  / [facility] MAIN OR    Anesthesia Start: 0836 Anesthesia Stop: 0930    Procedure: Right knee arthroscopy,  medial meniscectomy and synovectomy (Right: Knee) Diagnosis:       Acute medial meniscus tear of right knee, subsequent encounter      (Acute medial meniscus tear of right knee, subsequent encounter [S83.789D])    Surgeons: Alex Cruz MD Anesthesiologist: Jojo Pack MD    Anesthesia Type: general ASA Status: 3            Anesthesia Type: general    Vitals Value Taken Time   /71 12/17/24 1010   Temp 97.5 °F (36.4 °C) 12/17/24 0930   Pulse 59 12/17/24 1012   Resp 15 12/17/24 1012   SpO2 95 % 12/17/24 1012   Vitals shown include unfiled device data.    [facility] AN Post Evaluation:   Patient Evaluated in PACU  Patient Participation: complete - patient participated  Level of Consciousness: awake and alert  Pain Management: adequate  Airway Patency:patent  Dental exam unchanged from preop  Yes    Nausea/Vomiting: none  Cardiovascular Status: blood pressure returned to baseline, hemodynamically stable and acceptable  Respiratory Status: acceptable and spontaneous ventilation  Postoperative Hydration acceptable      Jojo Pack MD  12/17/2024 10:13 AM

## 2024-12-17 NOTE — ANESTHESIA PROCEDURE NOTES
Airway  Date/Time: 12/17/2024 8:43 AM  Urgency: Elective      General Information and Staff    Patient location during procedure: OR  Anesthesiologist: Jojo Pack MD  Performed: anesthesiologist   Performed by: Jojo Pack MD  Authorized by: Jojo Pack MD      Indications and Patient Condition  Indications for airway management: anesthesia  Sedation level: deep  Preoxygenated: yes  Patient position: sniffing  Mask difficulty assessment: 1 - vent by mask    Final Airway Details  Final airway type: supraglottic airway      Successful airway: classic  Size 4       Number of attempts at approach: 1  Number of other approaches attempted: 0    Additional Comments  1 attempt, atraumatic. Dentition intact.

## 2024-12-17 NOTE — BRIEF OP NOTE
Pre-Operative Diagnosis: Acute medial meniscus tear of right knee, subsequent encounter [S83.283D]     Post-Operative Diagnosis: Acute medial meniscus tear of right knee, subsequent encounter [S83.592D]      Procedure Performed:   Right knee arthroscopy,  medial meniscectomy and synovectomy    Surgeons and Role:     * Elke Cruz MD - Primary    Assistant(s):  PA: Shemar Henao PA-C     Surgical Findings: OA     Specimen: path     Estimated Blood Loss: Blood Output: 0 mL (12/17/2024  9:15 AM)      Dictation Number:  [id number]    ELKE CRUZ MD  12/17/2024  10:13 AM

## 2024-12-17 NOTE — INTERVAL H&P NOTE
Pre-op Diagnosis: Acute medial meniscus tear of right knee, subsequent encounter [P69.674I]    The above referenced H&P was reviewed by ELKE BUI MD on 12/17/2024, the patient was examined and no significant changes have occurred in the patient's condition since the H&P was performed.  I discussed with the patient and/or legal representative the potential benefits, risks and side effects of this procedure; the likelihood of the patient achieving goals; and potential problems that might occur during recuperation.  I discussed reasonable alternatives to the procedure, including risks, benefits and side effects related to the alternatives and risks related to not receiving this procedure.  We will proceed with procedure as planned.

## 2024-12-18 ENCOUNTER — TELEPHONE (OUTPATIENT)
Dept: ORTHOPEDICS CLINIC | Facility: CLINIC | Age: 59
End: 2024-12-18

## 2024-12-18 ENCOUNTER — TELEPHONE (OUTPATIENT)
Dept: OBGYN CLINIC | Facility: CLINIC | Age: 59
End: 2024-12-18

## 2024-12-18 NOTE — OPERATIVE REPORT
[facility]    PATIENT'S NAME: SUPRIYA SANABRIA   ATTENDING PHYSICIAN: Alex Cruz MD   OPERATING PHYSICIAN: Alex Cruz MD   PATIENT ACCOUNT#:   [id number]    LOCATION:  [address]  MEDICAL RECORD #:   [id number]       YOB: 1965  ADMISSION DATE:       12/17/2024      OPERATION DATE:  12/17/2024    OPERATIVE REPORT      PREOPERATIVE DIAGNOSIS:  Medial meniscus tear, right knee.  POSTOPERATIVE DIAGNOSIS:  Medial meniscus tear, right knee; synovitis.  PROCEDURE:  Arthroscopy, medial meniscectomy and synovectomy, right knee.    ASSISTANT:  Shemar Henao PA-C.    INDICATIONS:  This 59-year-old female has a history of pain and locking in the right knee that has not responded to conservative management.  After discussion of the options for treatment, she requested the above procedure.  Our discussion included, but was not limited to, potential risks of anesthetic complication, infection, persistent or recurrent tearing, progression of underlying arthritis requiring reconstructive surgery such as knee replacement, etc.  Written consent was obtained.      OPERATIVE TECHNIQUE:  The patient was brought to the operating room and placed under general anesthesia.  Examination under anesthesia revealed negative Lachman test with no collateral instability.  There was a small effusion.  After the usual sterile prep and drape, the time-out, etc., the extremity was exsanguinated with an Esmarch and tourniquet inflated.  Superolateral, inferomedial, and lateral arthroscopic portals were established.  The suprapatellar pouch and medial lateral gutters were free of loose bodies.  There was rather extensive synovitis with beefy red synovium, and a synovectomy was performed.  There was moderate chondromalacia in the patellofemoral joint with no exposed bone.  The medial compartment demonstrated grade 3 chondromalacia diffusely over the weightbearing area of the femoral  condyle with no areas of exposed bone.  There was a complex tear of the posterior horn of the medial meniscus extending to the root.  This was debrided with basket forceps and the residual meniscus smoothed with a rotary shaver.  The residual meniscus was aggressively probed on the superior and inferior articular surfaces and was stable.  The ACL and PCL were normal.  The lateral compartment demonstrated normal tibial and femoral articular surfaces and a normal lateral meniscus.  The knee was drained of excess arthroscopic irrigation.  A routine subcuticular wound closure was performed.  Sterile dressings were applied.  The patient was stable under the care of Anesthesia at the completion of the procedure.    Dictated By Alex Cruz MD  d: 12/17/2024 10:14:05  t: 12/17/2024 19:00:49  Job [id number]  JS/

## 2024-12-19 ENCOUNTER — MED REC SCAN ONLY (OUTPATIENT)
Dept: INTERNAL MEDICINE CLINIC | Facility: CLINIC | Age: 59
End: 2024-12-19

## 2024-12-19 RX ORDER — MEDROXYPROGESTERONE ACETATE 10 MG
10 TABLET ORAL DAILY
Qty: 5 TABLET | Refills: 0 | Status: SHIPPED | OUTPATIENT
Start: 2024-12-19

## 2024-12-19 NOTE — TELEPHONE ENCOUNTER
Post-op call for Dr. Cruz    Date: 12/18/2024      Time: 6:58 PM   Patient: Lenin SOLORZANO number: [id number]   Surgery and surgery date:12/17/2024   Patient unavailable.  Left message on voicemail to call clinic with questions or concerns.  Number was provided.  Patient's general feeling since discharge:  Pain control (0-10):  Pain Medication:  dose/strength  Fever: yes no  Chills:  yes no  SOB:  yes no  Incision site appearance:  Redness  yes  no  Drainage yes no  Clean/dry/Intact yes no  Calf pain, redness or warmth:   Yes no   Bowel Regimen: yes no   If not, what are you taking stool softener/laxative?  Confirmed appointment date for post op:  Other concerns patients may ask:   Bathing and bandages   Patient needs to keep incision clean and dry for the first week.  Enforce rest, ice, compression elevation and use their pain medication accordingly.  If the patient needs more pain medication, please put in a communication.

## 2024-12-19 NOTE — TELEPHONE ENCOUNTER
Post-op call for Dr. Cruz    Date: 12/18/2024      Time: 6:56 PM   Patient: Lenin SOLORZANO number: [id number]   Surgery and surgery date:12/17/2024   Procedure Laterality Anesthesia   Right knee arthroscopy,  medial meniscectomy and synovectomy       Patient unavailable.  Left message on voicemail to call clinic with questions or concerns.  Number was provided./ SPOKE TO PATIENT  Patient's general feeling since discharge:/ Feeling OK using my walker.My foot is swollen. She thinks she has been walking on it too much. Advised with better elevation and use of ice  Pain control (0-10):/6  Pain Medication:  dose/strength/  Medication Quantity Refills Start End   HYDROcodone-acetaminophen 5-325 MG Oral Tab 2      She stated she would start using tylenol in a couple days. I gave her the parameters of milligrams of tylenol 3000mg-4000mg in 24 hours including hydrocodone 325mg  Fever:  no  Chills:  no  SOB:  no  Incision site appearance:  Redness  no  Drainage no  Clean/dry/Intact yes   Calf pain, redness or warmth:  no   Bowel Regimen: no LBM was 12/17/2024  I advised fresh fiber fruit , a stool softener and miralax if needed  If not, what are you taking stool softener/laxative?  Confirmed appointment date for post op:1-8-2025  Other concerns patients may ask: She wants to take a shower tomorrow as Doctor said so we discussed covering it with plastic and tape both ends.  Bathing and bandages   Patient needs to keep incision clean and dry for the first week./DONE  Enforce rest, ice, compression elevation and use their pain medication accordingly./ We went over ice protocol with ice pack under the knee  If the patient needs more pain medication, please put in a communication.

## 2024-12-19 NOTE — TELEPHONE ENCOUNTER
Called patient and notified of results and recommendations for Provera. Advised to expect bleeding within  weeks of taking the last pill. Patient states she is recovering from surgery and asking if okay to start Provera next week? Advised patient this should be fine. Pharmacy verified and prescription sent.

## 2024-12-19 NOTE — TELEPHONE ENCOUNTER
Left message to call back  ----- Message from Carol Ro sent at 12/17/2024  9:23 PM CST -----  Lining w/ shedding -- give provera 10 mg daily x 5 days   IV intact/Arm band on

## 2025-01-02 ENCOUNTER — PATIENT MESSAGE (OUTPATIENT)
Dept: OBGYN CLINIC | Facility: CLINIC | Age: 60
End: 2025-01-02

## 2025-01-02 NOTE — TELEPHONE ENCOUNTER
Patient does not wish to take the provera Dr. Ro prescribed. She is asking if there are other options. She is recovering from knee surgery and concerned about blood clots with provera. To Dr. Ro to please review.

## 2025-01-03 ENCOUNTER — TELEPHONE (OUTPATIENT)
Dept: INTERNAL MEDICINE CLINIC | Facility: CLINIC | Age: 60
End: 2025-01-03

## 2025-01-03 NOTE — TELEPHONE ENCOUNTER
Spoke to patient (name and  of patient verified). She is calling to request information about ophthalmology providers.   Agitar message sent with provider options as well as referral details.

## 2025-01-06 NOTE — TELEPHONE ENCOUNTER
She does not need to take it if prefers not. I am not concerned about using progesterone in her case -- it would be estrogen that causes increased blood clotting risk but that is not what I am advising to take to clean out any additional unstable lining.

## 2025-01-06 NOTE — TELEPHONE ENCOUNTER
Patient asking if there is an alternate treatment if she chooses not to take the provera.  Message to Dr. Ro for recommendations.

## 2025-01-07 NOTE — TELEPHONE ENCOUNTER
If bleeding presists, then would need outpatient dilation and curettage.    Pt/wife provided education re: results/rxs from [facility]. Pt/family to contact MD in order to determine further steps re: continuation of PO. All questions answered & both verbalizing comprehension.       Consider dysphagia tx once etiology for dysphagia is identified

## 2025-01-08 ENCOUNTER — OFFICE VISIT (OUTPATIENT)
Dept: ORTHOPEDICS CLINIC | Facility: CLINIC | Age: 60
End: 2025-01-08

## 2025-01-08 VITALS — DIASTOLIC BLOOD PRESSURE: 72 MMHG | HEART RATE: 57 BPM | SYSTOLIC BLOOD PRESSURE: 148 MMHG

## 2025-01-08 DIAGNOSIS — Z47.89 ORTHOPEDIC AFTERCARE: Primary | ICD-10-CM

## 2025-01-08 PROCEDURE — 20610 DRAIN/INJ JOINT/BURSA W/O US: CPT | Performed by: PHYSICIAN ASSISTANT

## 2025-01-08 RX ORDER — TRIAMCINOLONE ACETONIDE 40 MG/ML
40 INJECTION, SUSPENSION INTRA-ARTICULAR; INTRAMUSCULAR ONCE
Status: COMPLETED | OUTPATIENT
Start: 2025-01-08 | End: 2025-01-08

## 2025-01-08 RX ADMIN — TRIAMCINOLONE ACETONIDE 40 MG: 40 INJECTION, SUSPENSION INTRA-ARTICULAR; INTRAMUSCULAR at 15:07:00

## 2025-01-08 NOTE — PROGRESS NOTES
Per verbal order from Dr. Cruz, draw up and 4ml of 0.5% Marcaine and 1ml of Kenalog 40 for injection into right knee. Fay HARO MA  Patient provided education handout for cortisone injection.

## 2025-01-08 NOTE — PROGRESS NOTES
NURSING INTAKE COMMENTS:   Chief Complaint   Patient presents with    Post-Op     R knee sx on  12/17/24 - Pt states knee is swollen. States fluid in knee. Pain is worse at night. Pain radiates to thigh at times. No fever.        HPI: This 59 year old female presents today for follow-up on her right knee.  She is 3 weeks status postarthroscopy.  She feels better than she did prior to surgery, however she does still note swelling and some weakness in the knee.  She has been unable to start physical therapy due to not being able to get into the therapy department for another 3 weeks.  She has been trying to do some exercises on her own.    Past Medical History:    Anemia    Borderline diabetes    Chronic rhinitis    Dysuria    Esophageal reflux    High blood pressure    History of eye injury    OS, Eye injury from curtain kiki    Osteoarthritis    Palpitations    PMB (postmenopausal bleeding)    Prediabetes    Routine physical examination    Sleep apnea    cpap use daily     Past Surgical History:   Procedure Laterality Date    Colonoscopy      Colonoscopy      Egd      Saleem localization wire 1 site right (cpt=19281)      Needle biopsy left      Other  05/2021    hysteroscopic polypectomy    Other surgical history Bilateral     fine needle breast biopsy - benign    Stress test scan      Upper gi endoscopy,exam       Current Outpatient Medications   Medication Sig Dispense Refill    medroxyPROGESTERone Acetate (PROVERA) 10 MG Oral Tab Take 1 tablet (10 mg total) by mouth daily. 5 tablet 0    HYDROcodone-acetaminophen 5-325 MG Oral Tab Take 1 tablet by mouth every 4 (four) hours as needed. 20 tablet 0    Thiamine HCl (VITAMIN B-1 OR) Take by mouth.      magnesium 30 MG Oral Tab Take 1 tablet (30 mg total) by mouth 2 (two) times daily.      Potassium Chloride ER 20 MEQ Oral Tab CR Take 20 mEq by mouth 2 (two) times daily. 4 tablet 0    EPINEPHrine (EPIPEN 2-MARLENE) 0.3 MG/0.3ML Injection Solution Auto-injector Inject IM in  event of  allergic reaction 1 each 0    naproxen 500 MG Oral Tab Take 1 tablet (500 mg total) by mouth 2 (two) times daily with meals. Take with food. Stop if stomach upset. 60 tablet 0    montelukast 10 MG Oral Tab Take 1 tablet by mouth 1 time each day in the evening. 90 tablet 3    carvedilol 12.5 MG Oral Tab Take 1 tablet (12.5 mg total) by mouth 2 (two) times daily with meals. 180 tablet 3    Omeprazole 40 MG Oral Capsule Delayed Release Take 1 capsule (40 mg total) by mouth Q12H. 180 capsule 3    levocetirizine 5 MG Oral Tab TAKE 1 TABLET BY MOUTH ONCE DAILY IN THE EVENING 90 tablet 3    losartan-hydroCHLOROthiazide 100-25 MG Oral Tab Take 1 tablet by mouth daily. 90 tablet 3    azelastine 0.1 % Nasal Solution 1 spray by Nasal route 2 (two) times daily. 3 each 1    MECLIZINE 25 MG Oral Tab Take 1 tablet by mouth 3  times daily as needed. 20 tablet 0    Meloxicam 15 MG Oral Tab TAKE 1/2 - 1 tablet BY MOUTH ONCE DAILY prn 30 tablet 0    Diclofenac Sodium 1 % Transdermal Gel APPLY TO THE AFFECTED AREA TWICE DAILY AS NEEDED 100 g 1    Mometasone Furoate 0.1 % External Cream External application 2 times daily as directed. 45 g 0     Allergies[1]  Family History   Problem Relation Age of Onset    Hypertension Father     Lipids Father     Thyroid disease Father     Hypertension Mother     Lipids Mother     Thyroid disease Mother     Diabetes Maternal Grandmother     Diabetes Maternal Grandfather     Thyroid disease Sister     Thyroid disease Brother     Macular degeneration Neg     Glaucoma Neg        Social History     Occupational History    Not on file   Tobacco Use    Smoking status: Never    Smokeless tobacco: Never   Vaping Use    Vaping status: Never Used   Substance and Sexual Activity    Alcohol use: No     Alcohol/week: 0.0 standard drinks of alcohol    Drug use: No    Sexual activity: Not on file        Review of Systems:  GENERAL: feels generally well, no significant weight loss or weight gain  SKIN: no  ulcerated or worrisome skin lesions  EYES:denies blurred vision or double vision  HEENT: denies new nasal congestion, sinus pain or ST  LUNGS: denies shortness of breath  CARDIOVASCULAR: denies chest pain  GI: no hematemesis, no worsening heartburn, no diarrhea  : no dysuria, no blood in urine, no difficulty urinating, no incontinence  MUSCULOSKELETAL: no other musculoskeletal complaints other than in HPI  NEURO: no numbness or tingling, no weakness or balance disorder  PSYCHE: no depression or anxiety  HEMATOLOGIC: no hx of blood dyscrasia  ENDOCRINE: no thyroid or diabetes issues  ALL/ASTHMA: no new hx of severe allergy or asthma    Physical Examination:    [facility] 05/08/2015   Constitutional: appears well hydrated, alert and responsive, no acute distress noted  Extremities: The incisions are healing well.  Moderate sized effusion.  No pain with flexion or extension of the knee, there is some tightness.      Imaging: No results found.     Lab Results   Component Value Date    WBC 6.5 11/20/2024    HGB 12.2 11/20/2024    .0 11/20/2024      Lab Results   Component Value Date     (H) 11/20/2024    BUN 12 11/20/2024    CREATSERUM 0.75 11/20/2024    GFRNAA 95 05/19/2022    GFRAA 109 05/19/2022        Assessment and Plan:  Diagnoses and all orders for this visit:    Orthopedic aftercare  -     triamcinolone acetonide (Kenalog-40) 40 MG/ML injection 40 mg        Assessment: Status postarthroscopy right knee    Plan: Ms. Rodriguez is progressing following her surgery.  I told her to look at other facilities for physical therapy so that she can start sooner than the end of the month.  We discussed the effusion in her knee.  She had an aspiration and cortisone injection a few months ago.  She feels that the effusion is impairing her recovery.  I prepped the right knee with Betadine and aspirated 20 cc of clear yellow synovial fluid and injected the local anesthetic and cortisone.  She will let us know if she is  having issues finding a different therapy location.  I like to see her back in 4 weeks to ensure that she continues to improve.  We did discuss that there was some degenerative changes in her knee seen during surgery which may need further treatment in the future.  I advised her to call if any questions or problems arise prior to her next appointment.    The above note was creating using Dragon speech recognition technology. Please excuse any typos.    This visit was performed under the supervision of Dr. Alex Cruz who formulated the treatment plan and decision making.        [1]   Allergies  Allergen Reactions    Shrimp ANAPHYLAXIS    Sulfa Antibiotics RASH, SWELLING and SHORTNESS OF BREATH    Amoxicillin FACE FLUSHING     No skin peeling or blistering or organ involvement     Seasonal OTHER (SEE COMMENTS)     Runny nose, itchy eyes, congestion     Lisinopril Coughing    Rash Away  [Sensi-Care Protective Barrier] RASH

## 2025-01-09 ENCOUNTER — OFFICE VISIT (OUTPATIENT)
Dept: PODIATRY CLINIC | Facility: CLINIC | Age: 60
End: 2025-01-09
Payer: COMMERCIAL

## 2025-01-09 DIAGNOSIS — E11.9 TYPE 2 DIABETES MELLITUS WITHOUT COMPLICATION, WITHOUT LONG-TERM CURRENT USE OF INSULIN (HCC): Primary | ICD-10-CM

## 2025-01-09 DIAGNOSIS — R60.0 LOWER EXTREMITY EDEMA: ICD-10-CM

## 2025-01-09 PROCEDURE — 99204 OFFICE O/P NEW MOD 45 MIN: CPT | Performed by: STUDENT IN AN ORGANIZED HEALTH CARE EDUCATION/TRAINING PROGRAM

## 2025-01-09 NOTE — PROGRESS NOTES
[facility] Podiatry  Progress Note      Lenin Rodriguez is a 59 year old female.   Chief Complaint   Patient presents with    Diabetic Foot Care     Consult Diabetic Foot Care, on 11/20/2024 LOV with PCP Yani Lerma MD. A1C= 6.9 done on 12/4/24, patient does not check BS because she does not have glucometer.                   HPI:     Patient is a very pleasant 59-year-old diabetic female presents to clinic for bilateral foot evaluation.  Admits to having custom orthotics in the past however she feels that the push under her arch causing dorsal midfoot pain.  Denies any recent pedal injuries or trauma.  Denies any signs of infection.    Allergies: Shrimp, Sulfa antibiotics, Amoxicillin, Seasonal, Lisinopril, and Rash away  [sensi-care protective barrier]    Current Outpatient Medications   Medication Sig Dispense Refill    medroxyPROGESTERone Acetate (PROVERA) 10 MG Oral Tab Take 1 tablet (10 mg total) by mouth daily. 5 tablet 0    Thiamine HCl (VITAMIN B-1 OR) Take by mouth.      magnesium 30 MG Oral Tab Take 1 tablet (30 mg total) by mouth 2 (two) times daily.      Potassium Chloride ER 20 MEQ Oral Tab CR Take 20 mEq by mouth 2 (two) times daily. 4 tablet 0    EPINEPHrine (EPIPEN 2-MARLENE) 0.3 MG/0.3ML Injection Solution Auto-injector Inject IM in event of  allergic reaction 1 each 0    naproxen 500 MG Oral Tab Take 1 tablet (500 mg total) by mouth 2 (two) times daily with meals. Take with food. Stop if stomach upset. 60 tablet 0    montelukast 10 MG Oral Tab Take 1 tablet by mouth 1 time each day in the evening. 90 tablet 3    carvedilol 12.5 MG Oral Tab Take 1 tablet (12.5 mg total) by mouth 2 (two) times daily with meals. 180 tablet 3    Omeprazole 40 MG Oral Capsule Delayed Release Take 1 capsule (40 mg total) by mouth Q12H. 180 capsule 3    levocetirizine 5 MG Oral Tab TAKE 1 TABLET BY MOUTH ONCE DAILY IN THE EVENING 90 tablet 3    losartan-hydroCHLOROthiazide 100-25 MG Oral Tab Take 1 tablet by  mouth daily. 90 tablet 3    azelastine 0.1 % Nasal Solution 1 spray by Nasal route 2 (two) times daily. 3 each 1    MECLIZINE 25 MG Oral Tab Take 1 tablet by mouth 3  times daily as needed. 20 tablet 0    Meloxicam 15 MG Oral Tab TAKE 1/2 - 1 tablet BY MOUTH ONCE DAILY prn 30 tablet 0    Diclofenac Sodium 1 % Transdermal Gel APPLY TO THE AFFECTED AREA TWICE DAILY AS NEEDED 100 g 1    Mometasone Furoate 0.1 % External Cream External application 2 times daily as directed. 45 g 0    HYDROcodone-acetaminophen 5-325 MG Oral Tab Take 1 tablet by mouth every 4 (four) hours as needed. (Patient not taking: Reported on 1/9/2025) 20 tablet 0      Past Medical History:    Anemia    Borderline diabetes    Chronic rhinitis    Dysuria    Esophageal reflux    High blood pressure    History of eye injury    OS, Eye injury from curtain kiki    Osteoarthritis    Palpitations    PMB (postmenopausal bleeding)    Prediabetes    Routine physical examination    Sleep apnea    cpap use daily      Past Surgical History:   Procedure Laterality Date    Colonoscopy      Colonoscopy      Egd      Saleem localization wire 1 site right (cpt=19281)      Needle biopsy left      Other  05/2021    hysteroscopic polypectomy    Other surgical history Bilateral     fine needle breast biopsy - benign    Stress test scan      Upper gi endoscopy,exam        Family History   Problem Relation Age of Onset    Hypertension Father     Lipids Father     Thyroid disease Father     Hypertension Mother     Lipids Mother     Thyroid disease Mother     Diabetes Maternal Grandmother     Diabetes Maternal Grandfather     Thyroid disease Sister     Thyroid disease Brother     Macular degeneration Neg     Glaucoma Neg       Social History     Socioeconomic History    Marital status:    Tobacco Use    Smoking status: Never    Smokeless tobacco: Never   Vaping Use    Vaping status: Never Used   Substance and Sexual Activity    Alcohol use: No     Alcohol/week: 0.0  standard drinks of alcohol    Drug use: No   Other Topics Concern    Caffeine Concern Yes     Comment: 1 cup Coffee    Right Handed Yes           REVIEW OF SYSTEMS:     Denies nause, fever, chills  No calf pain  Denies chest pain or SOB      EXAM:   [facility] 05/08/2015   GENERAL: well developed, well nourished, in no apparent distress  EXTREMITIES:   1. Integument: Normal skin temperature and turgor.  Toenails x 10 are within normal limits  2. Vascular: Dorsalis pedis two out of four bilateral and posterior tibial pulses two out of   four bilateral, capillary refill normal.  Nonpitting lower extremity edema   3. Musculoskeletal: All muscle groups are graded 5 out of 5 in the foot and ankle.   4. Neurological: Normal sharp dull sensation; reflexes normal.    Bilateral barefoot skin diabetic exam is normal, visualized feet and the appearance is normal.  Bilateral monofilament/sensation of both feet is normal.  Pulsation pedal pulse exam of both lower legs/feet is normal as well.             ASSESSMENT AND PLAN:   Diagnoses and all orders for this visit:    Type 2 diabetes mellitus without complication, without long-term current use of insulin (HCC)    Lower extremity edema        Plan:       -Patient examined, chart history reviewed.  -Discussed importance of proper pedal hygiene, regular foot checks, and tight glucose control.  -Recommend over-the-counter Redithotic inserts  -Close toe knee-high compression socks either 10-20 mmHg or 15-20 mmHg.  Advised patient to wear them first thing in the morning and to take them off before bedtime  -Ambulate with supportive shoes and inserts and avoid walking barefoot.  -Educated patient on acute signs of infection advised patient to seek immediate medical attention if symptoms arise.    Return to clinic in 1 year    The patient indicates understanding of these issues and agrees to the plan.        Deena Alexandre DPM

## 2025-01-17 ENCOUNTER — TELEPHONE (OUTPATIENT)
Dept: PHYSICAL THERAPY | Age: 60
End: 2025-01-17

## 2025-01-21 ENCOUNTER — TELEPHONE (OUTPATIENT)
Dept: PHYSICAL THERAPY | Age: 60
End: 2025-01-21

## 2025-01-27 ENCOUNTER — OFFICE VISIT (OUTPATIENT)
Dept: OTOLARYNGOLOGY | Facility: CLINIC | Age: 60
End: 2025-01-27

## 2025-01-27 ENCOUNTER — TELEPHONE (OUTPATIENT)
Dept: PHYSICAL THERAPY | Facility: HOSPITAL | Age: 60
End: 2025-01-27

## 2025-01-27 DIAGNOSIS — K21.00 GASTROESOPHAGEAL REFLUX DISEASE WITH ESOPHAGITIS, UNSPECIFIED WHETHER HEMORRHAGE: ICD-10-CM

## 2025-01-27 DIAGNOSIS — G47.33 OSA (OBSTRUCTIVE SLEEP APNEA): Primary | ICD-10-CM

## 2025-01-27 DIAGNOSIS — J34.3 HYPERTROPHY OF BOTH INFERIOR NASAL TURBINATES: ICD-10-CM

## 2025-01-27 DIAGNOSIS — J38.7: ICD-10-CM

## 2025-01-27 DIAGNOSIS — J34.2 NASAL SEPTAL DEVIATION: ICD-10-CM

## 2025-01-27 PROCEDURE — 99214 OFFICE O/P EST MOD 30 MIN: CPT | Performed by: STUDENT IN AN ORGANIZED HEALTH CARE EDUCATION/TRAINING PROGRAM

## 2025-01-27 RX ORDER — AZELASTINE 1 MG/ML
2 SPRAY, METERED NASAL 2 TIMES DAILY
Qty: 30 ML | Refills: 0 | Status: SHIPPED | OUTPATIENT
Start: 2025-01-27

## 2025-01-27 NOTE — PROGRESS NOTES
Lenin Rodriguez is a 59 year old female.   Chief Complaint   Patient presents with    Follow - Up     Patient here due to GERD follow up. Reports improvement in symptoms. 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  She reports much improvement especially after using the cervical fate.  She reports nasal obstruction worse on the left side this is a chronic issue.  She has used Astelin occasionally with some relief.  This affects her CPAP use    Current Outpatient Medications   Medication Sig Dispense Refill    medroxyPROGESTERone Acetate (PROVERA) 10 MG Oral Tab Take 1 tablet (10 mg total) by mouth daily. 5 tablet 0    HYDROcodone-acetaminophen 5-325 MG Oral Tab Take 1 tablet by mouth every 4 (four) hours as needed. 20 tablet 0    Thiamine HCl (VITAMIN B-1 OR) Take by mouth.      magnesium 30 MG Oral Tab Take 1 tablet (30 mg total) by mouth 2 (two) times daily.      Potassium Chloride ER 20 MEQ Oral Tab CR Take 20 mEq by mouth 2 (two) times daily. 4 tablet 0    EPINEPHrine (EPIPEN 2-MARLENE) 0.3 MG/0.3ML Injection Solution Auto-injector Inject IM in event of  allergic reaction 1 each 0    naproxen 500 MG Oral Tab Take 1 tablet (500 mg total) by mouth 2 (two) times daily with meals. Take with food. Stop if stomach upset. 60 tablet 0    montelukast 10 MG Oral Tab Take 1 tablet by mouth 1 time each day in the evening. 90 tablet 3    carvedilol 12.5 MG Oral Tab Take 1 tablet (12.5 mg total) by mouth 2 (two) times daily with meals. 180 tablet 3    Omeprazole 40 MG Oral Capsule Delayed Release Take 1 capsule (40 mg total) by mouth Q12H. 180 capsule 3    levocetirizine 5 MG Oral Tab TAKE 1 TABLET BY MOUTH ONCE DAILY IN THE EVENING 90 tablet 3    losartan-hydroCHLOROthiazide 100-25 MG Oral Tab Take 1 tablet by mouth daily. 90 tablet 3    azelastine 0.1 % Nasal Solution 1 spray by Nasal route 2 (two) times daily. 3 each 1    MECLIZINE 25 MG Oral  Tab Take 1 tablet by mouth 3  times daily as needed. 20 tablet 0    Meloxicam 15 MG Oral Tab TAKE 1/2 - 1 tablet BY MOUTH ONCE DAILY prn 30 tablet 0    Diclofenac Sodium 1 % Transdermal Gel APPLY TO THE AFFECTED AREA TWICE DAILY AS NEEDED 100 g 1    Mometasone Furoate 0.1 % External Cream External application 2 times daily as directed. 45 g 0      Past Medical History:    Anemia    Borderline diabetes    Chronic rhinitis    Dysuria    Esophageal reflux    High blood pressure    History of eye injury    OS, Eye injury from curtain kiki    Osteoarthritis    Palpitations    PMB (postmenopausal bleeding)    Prediabetes    Routine physical examination    Sleep apnea    cpap use daily      Social History:  Social History     Socioeconomic History    Marital status:    Tobacco Use    Smoking status: Never    Smokeless tobacco: Never   Vaping Use    Vaping status: Never Used   Substance and Sexual Activity    Alcohol use: No     Alcohol/week: 0.0 standard drinks of alcohol    Drug use: No   Other Topics Concern    Caffeine Concern Yes     Comment: 1 cup Coffee    Right Handed Yes      Past Surgical History:   Procedure Laterality Date    Colonoscopy      Colonoscopy      Egd      Saleem localization wire 1 site right (cpt=19281)      Needle biopsy left      Other  05/2021    hysteroscopic polypectomy    Other surgical history Bilateral     fine needle breast biopsy - benign    Stress test scan      Upper gi endoscopy,exam           EXAM:   [facility] 05/08/2015     System Details   Skin Inspection - Normal.   Constitutional Overall appearance - Normal.   Head/Face Symmetric, TMJ tenderness not present    Eyes EOMI, PERRL   Right ear:  Canal clear, TM intact, no LES   Left ear:  Canal clear, TM intact, no LES   Nose: Septum deviated to the left, inferior turbinates enlarged, nasal valves without collapse    Oral cavity/Oropharynx: No lesions or masses on inspection or palpation, tonsils symmetric    Neck: Soft without LAD,  thyroid not enlarged  Voice clear/ no stridor   Other:      SCOPES AND PROCEDURES:         AUDIOGRAM AND IMAGING:         IMPRESSION:   1. DHEERAJ (obstructive sleep apnea)    2. Nasal septal deviation    3. Hypertrophy of both inferior nasal turbinates    4. Gastroesophageal reflux disease with esophagitis, unspecified whether hemorrhage    5. Larynx ulceration       Recommendations:  -She deferred laryngoscopy today although clinically appears to be feeling much better in terms of discomfort in her larynx indicating this may have been an inflammatory ulcer possibly in the setting of her reflux  -Discussed the utility of septoplasty and turbinate reduction for her septal deviation in the setting of sleep apnea and CPAP difficulties  -She has already tried Astelin nasal spray and we will send in refills for this as this helps partially  -She will think about the surgery and call back if interested    The patient indicates understanding of these issues and agrees to the plan.  Longitudinal care will be provided with possible procedure and follow-up    Braeden Hernandes MD  1/27/2025  12:43 PM

## 2025-01-28 ENCOUNTER — OFFICE VISIT (OUTPATIENT)
Dept: PHYSICAL THERAPY | Age: 60
End: 2025-01-28
Attending: ORTHOPAEDIC SURGERY
Payer: COMMERCIAL

## 2025-01-28 DIAGNOSIS — M17.0 PRIMARY OSTEOARTHRITIS OF BOTH KNEES: Primary | ICD-10-CM

## 2025-01-28 PROCEDURE — 97161 PT EVAL LOW COMPLEX 20 MIN: CPT

## 2025-01-28 PROCEDURE — 97110 THERAPEUTIC EXERCISES: CPT

## 2025-01-28 PROCEDURE — 97140 MANUAL THERAPY 1/> REGIONS: CPT

## 2025-01-28 NOTE — PROGRESS NOTES
LOWER EXTREMITY EVALUATION:     Diagnosis:   Primary osteoarthritis of both knees (M17.0) Patient:  Lenin Rodriguez (59 year old, female)        Referring Provider: Alex Cruz  Today's Date   1/28/2025    Precautions:  None   Date of Evaluation: 01/28/25  Next MD visit: No data recorded  Date of Surgery: 12/17/2024     PATIENT SUMMARY   Summary of chief complaints: right knee, low back, and bilateral shoulder pain  History of current condition: Patient presenting to physical therapy status post right medial meniscectomy and synovectomy on 12/17/2024. Patient states that she has started to have pain in her back and shoulders and believes that it may be related to her gait. Patient also notes that her left knee has been starting to hurt and endorses a history of bilateral knee osteoarthritis. Patient also notes a history of back pain in [id number].   Pain level: current 3 /10, at best 3 /10, at worst 7 /10  Description of symptoms:  dull, achey    Clicking/locking: endorses intermittent that can be painful, particularly with turning  Aggravating factors: pivoting, stairs, walking, standing, increased bending, putting weight  Easing factors: diclofenac gel, elevating legs, heating pad, massage  24-hour pattern: worse at the end of the day   Irritability: low     P2: R low back  Aggravating factors: prolonged standing   Periodicity: constant     Pe: posterior aspect of neck and shoulder   Aggravating factors: prolonged standing     Occupation: stays at home   Prior level of function: IND with ADLs and IADLs  Current limitations: turning, walking, stairs, standing    Past medical history was reviewed with Lenin.    Imaging/Tests: see chart   Lenin  has a past medical history of Anemia, Borderline diabetes, Chronic rhinitis, Dysuria (07/12/2018), Esophageal reflux, High blood pressure, History of eye injury (1999), Osteoarthritis, Palpitations, PMB (postmenopausal bleeding) (12/07/2015), Prediabetes,  Routine physical examination (12/07/2015), and Sleep apnea.  She  has a past surgical history that includes other surgical history (Bilateral); rodger localization wire 1 site right (cpt=19281); needle biopsy left; colonoscopy; egd; colonoscopy; upper gi endoscopy,exam; Stress Test Scan (Scan); and other (05/2021).  Medications Ordered Prior to Encounter[1]     PHQ-2: (1) 0, (2) 0    ASSESSMENT  Lenin presents to physical therapy evaluation with primary c/o right knee, low back, and bilateral shoulder pain. The results of the objective tests and measures show bilateral pes planus with ambulation, hypomobility throughout the lumbar spine and pain with passive accessory range of motion, right medial and lateral joint line tenderness. Functional deficits include but are not limited to turning, walking, stairs, standing. Patient presenting appropriately at current stage of recovery without complications noted. Low back pain may be consistent with facet arthropathy. Pt and PT discussed evaluation findings, pathology, POC and HEP.  Pt voiced understanding and performs HEP correctly without reported pain. Skilled Physical Therapy is medically necessary to address the above impairments and reach functional goals.    OBJECTIVE:   Musculoskeletal  Vitals: VSS  Observation: varicose veins BLEs, increased lumbar lordosis, R/L pes planus  Palpation: TTP lumbar paraspinals, R medial and lateral knee joint line   Accessory Motion:     Lumbar PAIVMS:  : L1-L5 hypomobile, local hyperalgesia  UPAs: R/L L1/2 - L5/S1 hypomobile with local hyperalgesia, R L5/S1 p*, instance of secondary hyperalgesia into right anterior thigh    Edema: bilateral lower extremity edema, non-pitting     SAUL ROM WNL(* denotes performed with pain)  Knee   ROM    R L     Flex 116 116     Ext (L3) 0 0     Balance and Functional Mobility:  Gait: pt ambulates on level ground with trendelenburg/waddle, pronounced foot pronation bilaterally  // 4/10 low back  pain  Stairs: reciprocal pattern ascending/descending with rail use     Today's Treatment and Response:   Today's Treatment       1/28/2025   Treatment   Therapeutic Exercise - Pt education was provided on exam findings, treatment diagnosis, treatment plan, expectations, and prognosis.  - Prone press up on elbows x10    Manual Therapy - R L5/S1 UPA gr III x5 min // 2-3/10 low back p* ambulation   *Increased time to re-assess    Therapeutic Exercise Min 8   Manual Therapy Min 8   Evaluation Min 44   Total of Timed Procedures 16   Total of Service Based 44   Total Treatment Time 60         Patient was instructed in and issued a HEP for: Prone press up on elbows    Charges:  PT EVAL: Low Complexity, MT 1, EX 1  In agreement with evaluation findings and clinical rationale, this evaluation involved LOW COMPLEXITY decision making due to no personal factors/comorbidities, 1-2 body structures involved/activity limitations, and stable symptoms as documented in the evaluation.                                                                                                                PLAN OF CARE:    Goals: (to be met in 8 visits)   Goals       Therapy Goals     Patient will improve LEFS by at least 9 points to demonstrate minimally clinically important difference in patient reported outcome measure for knee pain.   Patient will be able to ambulate for at least 1/2 mile without low back pain or right knee pain to facilitate independence with community ambulation.   Patient will be able to ascend/descend at least 1 flight of stairs without right knee pain to facilitate independence with stair negotiation.   Patient will be able to stand for at least 30 minutes without right knee pain to facilitate independence with ADLs and IADLs including but not limited to cooking.               Frequency / Duration: Patient will be seen 1-2x/week or a total of 8  visits over a 90 day period. Treatment will include: Gait training; Manual  Therapy; Neuromuscular Re-education; Therapeutic Activities; Therapeutic Exercise; Home Exercise Program instruction; Electrical stimulation (attended)    Education or treatment limitation: None   Rehab Potential: good     LEFS Score  LEFS Score: (Patient-Rptd) 58.75 % (2025 10:39 AM)      Patient/Family/Caregiver was advised of these findings, precautions, and treatment options and has agreed to actively participate in planning and for this course of care.    Thank you for your referral. Please co-sign or sign and return this letter via fax as soon as possible to [phone]. If you have any questions, please contact me at Dept: [phone]    Sincerely,  Electronically signed by therapist: Gudelia Alonso PT  Physician's certification required: Yes  I certify the need for these services furnished under this plan of treatment and while under my care.    X___________________________________________________ Date____________________    Certification From: 2025  To:2025         [1]   Current Outpatient Medications on File Prior to Visit   Medication Sig Dispense Refill    azelastine 0.1 % Nasal Solution 2 sprays by Nasal route 2 (two) times daily. 30 mL 0    medroxyPROGESTERone Acetate (PROVERA) 10 MG Oral Tab Take 1 tablet (10 mg total) by mouth daily. 5 tablet 0    HYDROcodone-acetaminophen 5-325 MG Oral Tab Take 1 tablet by mouth every 4 (four) hours as needed. 20 tablet 0    Thiamine HCl (VITAMIN B-1 OR) Take by mouth.      magnesium 30 MG Oral Tab Take 1 tablet (30 mg total) by mouth 2 (two) times daily.      Potassium Chloride ER 20 MEQ Oral Tab CR Take 20 mEq by mouth 2 (two) times daily. 4 tablet 0    EPINEPHrine (EPIPEN 2-MARLENE) 0.3 MG/0.3ML Injection Solution Auto-injector Inject IM in event of  allergic reaction 1 each 0    [] sucralfate 1 g Oral Tab Take 1 tablet (1 g total) by mouth 4 (four) times daily before meals and nightly for 14 days. (Patient not taking: Reported on 2024) 56  tablet 0    naproxen 500 MG Oral Tab Take 1 tablet (500 mg total) by mouth 2 (two) times daily with meals. Take with food. Stop if stomach upset. 60 tablet 0    montelukast 10 MG Oral Tab Take 1 tablet by mouth 1 time each day in the evening. 90 tablet 3    carvedilol 12.5 MG Oral Tab Take 1 tablet (12.5 mg total) by mouth 2 (two) times daily with meals. 180 tablet 3    Omeprazole 40 MG Oral Capsule Delayed Release Take 1 capsule (40 mg total) by mouth Q12H. 180 capsule 3    levocetirizine 5 MG Oral Tab TAKE 1 TABLET BY MOUTH ONCE DAILY IN THE EVENING 90 tablet 3    losartan-hydroCHLOROthiazide 100-25 MG Oral Tab Take 1 tablet by mouth daily. 90 tablet 3    azelastine 0.1 % Nasal Solution 1 spray by Nasal route 2 (two) times daily. 3 each 1    MECLIZINE 25 MG Oral Tab Take 1 tablet by mouth 3  times daily as needed. 20 tablet 0    Meloxicam 15 MG Oral Tab TAKE 1/2 - 1 tablet BY MOUTH ONCE DAILY prn 30 tablet 0    Diclofenac Sodium 1 % Transdermal Gel APPLY TO THE AFFECTED AREA TWICE DAILY AS NEEDED 100 g 1    Mometasone Furoate 0.1 % External Cream External application 2 times daily as directed. 45 g 0     Current Facility-Administered Medications on File Prior to Visit   Medication Dose Route Frequency Provider Last Rate Last Admin    [COMPLETED] triamcinolone acetonide (Kenalog-40) 40 MG/ML injection 40 mg  40 mg Intra-articular Once Shemar Henao PA-C   40 mg at 01/08/25 1507    [COMPLETED] acetaminophen (Tylenol Extra Strength) tab 1,000 mg  1,000 mg Oral Once Alex Cruz MD   1,000 mg at 12/17/24 0731    [COMPLETED] metoclopramide (Reglan) tab 10 mg  10 mg Oral Once Alex Cruz MD   10 mg at 12/17/24 0731    Or    [COMPLETED] metoclopramide (Reglan) 5 mg/mL injection 10 mg  10 mg Intravenous Once Alex Cruz MD        [COMPLETED] ceFAZolin (Ancef) 2g in 10mL IV syringe premix  2 g Intravenous Once Alex Cruz MD   2 g at 12/17/24 0842    [COMPLETED]  iopamidol 76% (ISOVUE-370) injection for power injector  80 mL Intravenous ONCE PRN Ashlee Victoria MD   80 mL at 11/05/24 9964

## 2025-01-29 ENCOUNTER — TELEPHONE (OUTPATIENT)
Dept: PHYSICAL THERAPY | Facility: HOSPITAL | Age: 60
End: 2025-01-29

## 2025-01-29 ENCOUNTER — APPOINTMENT (OUTPATIENT)
Dept: PHYSICAL THERAPY | Age: 60
End: 2025-01-29
Attending: ORTHOPAEDIC SURGERY
Payer: COMMERCIAL

## 2025-01-30 ENCOUNTER — HOSPITAL ENCOUNTER (OUTPATIENT)
Dept: ULTRASOUND IMAGING | Facility: HOSPITAL | Age: 60
Discharge: HOME | End: 2025-01-30
Attending: OBSTETRICS & GYNECOLOGY
Payer: COMMERCIAL

## 2025-01-30 DIAGNOSIS — N95.0 POSTMENOPAUSAL BLEEDING: ICD-10-CM

## 2025-01-30 DIAGNOSIS — D21.9 FIBROID: ICD-10-CM

## 2025-01-30 PROCEDURE — 76856 US EXAM PELVIC COMPLETE: CPT | Performed by: OBSTETRICS & GYNECOLOGY

## 2025-01-30 PROCEDURE — 76830 TRANSVAGINAL US NON-OB: CPT | Performed by: OBSTETRICS & GYNECOLOGY

## 2025-01-31 ENCOUNTER — APPOINTMENT (OUTPATIENT)
Dept: PHYSICAL THERAPY | Age: 60
End: 2025-01-31
Attending: ORTHOPAEDIC SURGERY
Payer: COMMERCIAL

## 2025-02-03 RX ORDER — CHOLECALCIFEROL (VITAMIN D3) 25 MCG
1000 TABLET ORAL DAILY
COMMUNITY

## 2025-02-04 ENCOUNTER — OFFICE VISIT (OUTPATIENT)
Dept: OBGYN CLINIC | Facility: CLINIC | Age: 60
End: 2025-02-04

## 2025-02-04 ENCOUNTER — OFFICE VISIT (OUTPATIENT)
Dept: PHYSICAL THERAPY | Age: 60
End: 2025-02-04
Attending: ORTHOPAEDIC SURGERY
Payer: COMMERCIAL

## 2025-02-04 VITALS
DIASTOLIC BLOOD PRESSURE: 74 MMHG | SYSTOLIC BLOOD PRESSURE: 138 MMHG | BODY MASS INDEX: 34 KG/M2 | HEART RATE: 64 BPM | WEIGHT: 194 LBS

## 2025-02-04 DIAGNOSIS — N95.0 POSTMENOPAUSAL BLEEDING: Primary | ICD-10-CM

## 2025-02-04 PROCEDURE — 97140 MANUAL THERAPY 1/> REGIONS: CPT

## 2025-02-04 PROCEDURE — 97110 THERAPEUTIC EXERCISES: CPT

## 2025-02-04 PROCEDURE — 99214 OFFICE O/P EST MOD 30 MIN: CPT | Performed by: OBSTETRICS & GYNECOLOGY

## 2025-02-04 NOTE — PROGRESS NOTES
Patient: Lenin Rodriguez (59 year old, female) Referring Provider:  Insurance:   Diagnosis: Primary osteoarthritis of both knees (M17.0) Alex MOULTON INSURED BY [employer]   Date of Surgery: 12/17/2024 Next MD visit:  N/A   Precautions:  None No data recorded Referral Information:    Date of Evaluation: Req: 6, Auth: 6, Exp: 1/28/2026 01/28/25 POC Auth Visits:          Today's Date   2/4/2025    Subjective  Patient states that she has been doing some of the exercises. Back, shoulders, and neck still continue to bother her. Has been doing her activities but has cut back 25%.       Pain:  3/10 p* R knee, 4-5/10 p* R low back      Objective        Muscle performance assessment:   Double leg heel raise: WFL  Single leg heel raise: R 12, increased reliance on BUE support L 12, increased reliance on BUE support  Hip extension: 2 min     Functional ankle DF ROM: R WFL, L WFL     Ambulation: 3-4/10 p*     Lumbar PAIVM exam:   : L3-L5 WFL, p*  R UPAs: p* L1/2 - L5/S1, most p* L4/5       Assessment  Continued assessment suggestive of plantar flexor and hip extensor muscle weakness as contributing factors to gait deficits.    Goals (to be met in 8 visits)   Goals       Therapy Goals     Patient will improve LEFS by at least 9 points to demonstrate minimally clinically important difference in patient reported outcome measure for knee pain.   Patient will be able to ambulate for at least 1/2 mile without low back pain or right knee pain to facilitate independence with community ambulation.   Patient will be able to ascend/descend at least 1 flight of stairs without right knee pain to facilitate independence with stair negotiation.   Patient will be able to stand for at least 30 minutes without right knee pain to facilitate independence with ADLs and IADLs including but not limited to cooking.               Plan  1-2x/week for 6 visits, foot and ankle muscle strength, glute med strength, neuro  exam    Treatment Last 4 Visits       1/28/2025 2/4/2025   PT Treatment   Treatment Day  2   Therapeutic Exercise - Pt education was provided on exam findings, treatment diagnosis, treatment plan, expectations, and prognosis.  - Prone press up on elbows x10  - Muscle performance assessment (see objective)  - Functional ankle DF (see objective)  - Ambulation (see objective)   Manual Therapy - R L5/S1 UPA gr III x5 min // 2-3/10 low back p* ambulation   *Increased time to re-assess  - Lumbar PAIVM exam (see objective)  - R L4/5 UPA gr III x4 min // 2/10 p* ambulation   Therapeutic Exercise Min 8 23   Manual Therapy Min 8 17   Evaluation Min 44    Total of Timed Procedures 16 40   Total of Service Based 44 0   Total Treatment Time 60 40         HEP  Prone press up on elbows, glute bridge isometrics 3x2 min, daily     Charges     EX 2, MT 1    Gudelia CARIAS, PT, 02/04/25, 10:46 AM

## 2025-02-06 ENCOUNTER — ANESTHESIA EVENT (OUTPATIENT)
Dept: ENDOSCOPY | Facility: HOSPITAL | Age: 60
End: 2025-02-06
Payer: COMMERCIAL

## 2025-02-06 ENCOUNTER — HOSPITAL ENCOUNTER (OUTPATIENT)
Facility: HOSPITAL | Age: 60
Setting detail: HOSPITAL OUTPATIENT SURGERY
Discharge: HOME | End: 2025-02-06
Attending: INTERNAL MEDICINE | Admitting: INTERNAL MEDICINE
Payer: COMMERCIAL

## 2025-02-06 ENCOUNTER — TELEPHONE (OUTPATIENT)
Dept: PHYSICAL THERAPY | Age: 60
End: 2025-02-06

## 2025-02-06 ENCOUNTER — ANESTHESIA (OUTPATIENT)
Dept: ENDOSCOPY | Facility: HOSPITAL | Age: 60
End: 2025-02-06
Payer: COMMERCIAL

## 2025-02-06 VITALS
BODY MASS INDEX: 33.49 KG/M2 | HEART RATE: 66 BPM | HEIGHT: 63 IN | WEIGHT: 189 LBS | DIASTOLIC BLOOD PRESSURE: 71 MMHG | RESPIRATION RATE: 21 BRPM | OXYGEN SATURATION: 100 % | SYSTOLIC BLOOD PRESSURE: 151 MMHG

## 2025-02-06 DIAGNOSIS — K21.9 GASTROESOPHAGEAL REFLUX DISEASE, UNSPECIFIED WHETHER ESOPHAGITIS PRESENT: ICD-10-CM

## 2025-02-06 DIAGNOSIS — R13.10 DYSPHAGIA, UNSPECIFIED TYPE: ICD-10-CM

## 2025-02-06 PROBLEM — K31.7 GASTRIC POLYPS: Status: ACTIVE | Noted: 2025-02-06

## 2025-02-06 PROBLEM — R13.19 ESOPHAGEAL DYSPHAGIA: Status: ACTIVE | Noted: 2025-02-06

## 2025-02-06 PROBLEM — K31.9 ANTRAL ERYTHEMA: Status: ACTIVE | Noted: 2025-02-06

## 2025-02-06 PROBLEM — K22.9 IRREGULAR Z LINE OF ESOPHAGUS: Status: ACTIVE | Noted: 2025-02-06

## 2025-02-06 PROCEDURE — 43239 EGD BIOPSY SINGLE/MULTIPLE: CPT | Performed by: INTERNAL MEDICINE

## 2025-02-06 PROCEDURE — 0DB68ZX EXCISION OF STOMACH, VIA NATURAL OR ARTIFICIAL OPENING ENDOSCOPIC, DIAGNOSTIC: ICD-10-PCS | Performed by: INTERNAL MEDICINE

## 2025-02-06 PROCEDURE — 0DB38ZX EXCISION OF LOWER ESOPHAGUS, VIA NATURAL OR ARTIFICIAL OPENING ENDOSCOPIC, DIAGNOSTIC: ICD-10-PCS | Performed by: INTERNAL MEDICINE

## 2025-02-06 PROCEDURE — 0DB18ZX EXCISION OF UPPER ESOPHAGUS, VIA NATURAL OR ARTIFICIAL OPENING ENDOSCOPIC, DIAGNOSTIC: ICD-10-PCS | Performed by: INTERNAL MEDICINE

## 2025-02-06 RX ORDER — NALOXONE HYDROCHLORIDE 0.4 MG/ML
0.08 INJECTION, SOLUTION INTRAMUSCULAR; INTRAVENOUS; SUBCUTANEOUS ONCE AS NEEDED
Status: DISCONTINUED | OUTPATIENT
Start: 2025-02-06 | End: 2025-02-06

## 2025-02-06 RX ORDER — SODIUM CHLORIDE, SODIUM LACTATE, POTASSIUM CHLORIDE, CALCIUM CHLORIDE 600; 310; 30; 20 MG/100ML; MG/100ML; MG/100ML; MG/100ML
INJECTION, SOLUTION INTRAVENOUS CONTINUOUS
Status: DISCONTINUED | OUTPATIENT
Start: 2025-02-06 | End: 2025-02-06

## 2025-02-06 RX ORDER — LIDOCAINE HYDROCHLORIDE 10 MG/ML
INJECTION, SOLUTION EPIDURAL; INFILTRATION; INTRACAUDAL; PERINEURAL AS NEEDED
Status: DISCONTINUED | OUTPATIENT
Start: 2025-02-06 | End: 2025-02-06 | Stop reason: SURG

## 2025-02-06 RX ADMIN — LIDOCAINE HYDROCHLORIDE 20 MG: 10 INJECTION, SOLUTION EPIDURAL; INFILTRATION; INTRACAUDAL; PERINEURAL at 10:59:00

## 2025-02-06 NOTE — DISCHARGE INSTRUCTIONS
Home Care Instructions for Gastroscopy with Sedation    Diet:  - Resume your regular diet as tolerated unless otherwise instructed.  - Start with light meals to minimize bloating.  - Do not drink alcohol today.    Medication:  - If you have questions about resuming your normal medications, please contact your Primary Care Physician.    Activities:  - Take it easy today. Do not return to work today.  - Do not drive today.  - Do not operate any machinery today (including kitchen equipment).    Gastroscopy:  - You may have a sore throat for 2-3 days following the exam. This is normal. Gargling with warm salt water (1/2 tsp salt to 1 glass warm water) or using throat lozenges will help.  - If you experience any sharp pain in your neck, abdomen or chest, vomiting of blood, oral temperature over 100 degrees Fahrenheit, light-headedness or dizziness, or any other problems, contact your doctor.    **If unable to reach your doctor, please go to the [facility] Emergency Room**    - Your referring physician will receive a full report of your examination.  - If you do not hear from your doctor's office within two weeks of your biopsy, please call them for your results.    You may be able to see your laboratory results in [employer] between 4 and 7 business days.  In some cases, your physician may not have viewed the results before they are released to [employer].  If you have questions regarding your results contact the physician who ordered the test/exam by phone or via [employer] by choosing \"Ask a Medical Question.\"

## 2025-02-06 NOTE — OPERATIVE REPORT
ESOPHAGOGASTRODUODENOSCOPY (EGD) REPORT    Lenin Rodriguez     8/15/1965 Age 59 year old   PCP JAYNE ARREOLA MD Endoscopist Ashlee Victoria MD       Date of procedure: 25    Procedure: EGD w/ biopsies     Pre-operative diagnosis: uncontrolled GERD on PPI, dysphagia     Post-operative diagnosis: irregular z-line, gastric polyps, gastric erythema     Medications: MAC    Complications: none    Procedure:  Informed consent was obtained from the patient after the risks of the procedure were discussed, including but not limited to bleeding, perforation, aspiration, infection, or possibility of a missed lesion. After discussions of the risks/benefits and alternatives to this procedure, as well as the planned sedation, the patient was placed in the left lateral decubitus position and begun on continuous blood pressure pulse oximetry and EKG monitoring and this was maintained throughout the procedure. Once an adequate level of sedation was obtained a bite block was placed. Then the lubricated tip of the [id number]-850 diagnostic video upper endoscope was inserted and advanced using direct visualization into the posterior pharynx and ultimately into the esophagus. The scope was then advanced through the stomach and to the second portion of the duodenum.    Complications: None    Findings:      1. Esophagus: The squamocolumnar junction was noted at 40 cm and appeared irregular. The diaphragmatic pinch was noted noted at 40 cm from the incisors. There was one short-segment of salmon-colored mucosa extending from the gastroesophageal junction. In addition, there were two small salmon-colored islands proximal to the GEJ. All the areas of salmon-colored mucosa were biopsied. The remainder of the esophageal mucosa appeared normal. Biopsies were also taken from the proximal esophagus.     2. Stomach: The stomach distended normally. Normal rugal folds were seen. The pylorus was patent. Retroflexion revealed a  normal fundus. The gastric mucosa appeared mildly erythematous in the antrum. Biopsies were taken with a cold forceps from the antrum, incisura and body for histology. There were multiple small polyps in the body, a representative sample were biopsied.     3. Duodenum: The duodenal mucosa appeared normal in the bulb and 2nd portion of the duodenum.     Impression:  -Esophagus: Short-segment salmon-colored mucosa extending from the GEJ and 2 small salmon-colored islands proximal to the GEJ, biopsied for BE. Biopsies also obtained from the proximal esophagus for EoE. No stricture and no significant hiatal hernia.   -Stomach: Mild antral erythema and multiple small polyps, suspect fundic gland polyps. Biopsied.   -Duodenum: Normal.     Recommend:  1. Await pathology.   2. Avoid NSAIDs as able.   3. Continue proton pump inhibitor (omeprazole).     >>>If tissue was sampled/removed and you have not received your pathology results either by phone or letter within 2 weeks, please call our office at [phone].    Specimens:  Gastric biopsies, gastric polyps, distal esophageal biopsies, proximal esophageal biopsies     Blood loss: <1 ml

## 2025-02-06 NOTE — ANESTHESIA POSTPROCEDURE EVALUATION
Patient: Lenin Rodriguez    Procedure Summary       Date: 02/06/25 Room / Location: [facility] ENDOSCOPY 03 / [facility] ENDOSCOPY    Anesthesia Start: 1057 Anesthesia Stop: 1118    Procedure: ESOPHAGOGASTRODUODENOSCOPY Diagnosis:       Gastroesophageal reflux disease, unspecified whether esophagitis present      Dysphagia, unspecified type      (gastric polyp; irregular Z-line)    Surgeons: Ashlee Victoria MD Anesthesiologist: Leo Valadez CRNA    Anesthesia Type: MAC ASA Status: 2            Anesthesia Type: MAC    Vitals Value Taken Time   /73 02/06/25 1125   Temp 98 02/06/25 1130   Pulse 62 02/06/25 1129   Resp 21 02/06/25 1129   SpO2 100 % 02/06/25 1129   Vitals shown include unfiled device data.    [facility] AN Post Evaluation:   Patient Evaluated in PACU  Patient Participation: complete - patient participated  Level of Consciousness: awake  Pain Management: adequateYes    Nausea/Vomiting: none  Cardiovascular Status: acceptable and blood pressure returned to baseline  Respiratory Status: acceptable and nasal cannula  Postoperative Hydration acceptable      Leo Valadez CRNA  2/6/2025 11:30 AM
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
## 2025-02-06 NOTE — ANESTHESIA PREPROCEDURE EVALUATION
Anesthesia PreOp Note    HPI:     Lenin Rodriguez is a 59 year old female who presents for preoperative consultation requested by: Ashlee Victoria MD    Date of Surgery: 2/6/2025    Procedure(s):  ESOPHAGOGASTRODUODENOSCOPY  Indication: Gastroesophageal reflux disease, unspecified whether esophagitis present / Dysphagia, unspecified type    Relevant Problems   No relevant active problems       NPO:                         History Review:  Patient Active Problem List    Diagnosis Date Noted    Orthopedic aftercare 01/08/2025    Age-related nuclear cataract of both eyes 06/14/2022    Flat feet, bilateral 01/05/2021    Vertigo 07/12/2018    Intramural and subserous leiomyoma of uterus 07/12/2018    Seasonal allergic rhinitis due to pollen 07/12/2018    Cervicalgia 12/07/2017    Esophageal spasm 12/07/2017    Diet-controlled diabetes mellitus (HCC) 12/07/2017    Vitamin D deficiency 12/07/2017    Other chronic sinusitis 10/19/2017    Cellulitis of left index finger 10/19/2017    Elevated blood sugar 10/19/2017    Dermatitis, eczematoid 10/19/2017    Vitreous floaters of left eye 12/06/2016    Anemia 03/21/2016    GERD without esophagitis 03/21/2016    Hypertension 09/15/2014    Chronic pain of left knee 09/15/2014       Past Medical History:    Anemia    Back problem    Back problem    Borderline diabetes    Chronic rhinitis    Dysuria    Esophageal reflux    High blood pressure    History of eye injury    OS, Eye injury from curtain kiki    Osteoarthritis    Palpitations    PMB (postmenopausal bleeding)    Prediabetes    Routine physical examination    Sleep apnea    cpap use daily    Visual impairment    glasses;/contacts       Past Surgical History:   Procedure Laterality Date    Arthroscopy of joint unlisted Right 12/17/2024    knee    Colonoscopy      Colonoscopy      Egd      Saleem localization wire 1 site right (cpt=19281)      Needle biopsy left      Other  05/2021    hysteroscopic polypectomy     Other surgical history Bilateral     fine needle breast biopsy - benign    Stress test scan      Upper gi endoscopy,exam         Prescriptions Prior to Admission[1]  Current Medications and Prescriptions Ordered in Epic[2]    Allergies[3]    Family History   Problem Relation Age of Onset    Hypertension Father     Lipids Father     Thyroid disease Father     Hypertension Mother     Lipids Mother     Thyroid disease Mother     Diabetes Maternal Grandmother     Diabetes Maternal Grandfather     Thyroid disease Sister     Thyroid disease Brother     Macular degeneration Neg     Glaucoma Neg      Social History     Socioeconomic History    Marital status:    Tobacco Use    Smoking status: Never    Smokeless tobacco: Never   Vaping Use    Vaping status: Never Used   Substance and Sexual Activity    Alcohol use: No     Alcohol/week: 0.0 standard drinks of alcohol    Drug use: No   Other Topics Concern    Caffeine Concern Yes     Comment: 1 cup Coffee    Right Handed Yes       Available pre-op labs reviewed.  Lab Results   Component Value Date    WBC 6.5 11/20/2024    RBC 4.27 11/20/2024    HGB 12.2 11/20/2024    HCT 39.4 11/20/2024    MCV 92.3 11/20/2024    MCH 28.6 11/20/2024    MCHC 31.0 11/20/2024    RDW 15.7 (H) 11/20/2024    .0 11/20/2024     Lab Results   Component Value Date     11/20/2024    K 4.3 12/04/2024     11/20/2024    CO2 31.0 11/20/2024    BUN 12 11/20/2024    CREATSERUM 0.75 11/20/2024     (H) 11/20/2024    PGLU 136 (H) 12/17/2024    CA 10.1 11/20/2024          Vital Signs:  Body mass index is 32.95 kg/m².   height is 1.613 m (5' 3.5\") and weight is 85.7 kg (189 lb).   Vitals:    02/03/25 1425   Weight: 85.7 kg (189 lb)   Height: 1.613 m (5' 3.5\")        Anesthesia Evaluation     Patient summary reviewed and Nursing notes reviewed    Airway   Mallampati: III  TM distance: <3 FB  Neck ROM: full  Dental - Dentition appears grossly intact     Pulmonary - normal exam   (+)  sleep apnea on CPAP  Cardiovascular - normal exam  (+) hypertension    ROS comment: Palpitations    Neuro/Psych      GI/Hepatic/Renal    (+) GERD    Endo/Other      Comments: Prediabetes  Abdominal   (+) obese                 Anesthesia Plan:   ASA:  2  Plan:   MAC  Informed Consent Plan and Risks Discussed With:  Patient  Discussed plan with:  CRNA      I have informed Lenin Rodriguez and/or legal guardian or family member of the nature of the anesthetic plan, benefits, risks including possible dental damage if relevant, major complications, and any alternative forms of anesthetic management.   All of the patient's questions were answered to the best of my ability. The patient desires the anesthetic management as planned.  Leo Valadez CRNA  2/6/2025 9:53 AM  Present on Admission:  **None**           [1]   Facility-Administered Medications Prior to Admission   Medication Dose Route Frequency Provider Last Rate Last Admin    [COMPLETED] triamcinolone acetonide (Kenalog-40) 40 MG/ML injection 40 mg  40 mg Intra-articular Once Shemar Henao PA-C   40 mg at 01/08/25 1507     Medications Prior to Admission   Medication Sig Dispense Refill Last Dose/Taking    cholecalciferol 25 MCG (1000 UT) Oral Tab Take 1 tablet (1,000 Units total) by mouth daily.   Taking    medroxyPROGESTERone Acetate (PROVERA) 10 MG Oral Tab Take 1 tablet (10 mg total) by mouth daily. 5 tablet 0 Taking    Thiamine HCl (VITAMIN B-1 OR) Take by mouth.   Taking    magnesium 30 MG Oral Tab Take 1 tablet (30 mg total) by mouth daily.   Taking    Potassium Chloride ER 20 MEQ Oral Tab CR Take 20 mEq by mouth 2 (two) times daily. 4 tablet 0 Taking    EPINEPHrine (EPIPEN 2-MARLENE) 0.3 MG/0.3ML Injection Solution Auto-injector Inject IM in event of  allergic reaction 1 each 0 Taking    montelukast 10 MG Oral Tab Take 1 tablet by mouth 1 time each day in the evening. 90 tablet 3 Taking    carvedilol 12.5 MG Oral Tab Take 1 tablet (12.5  mg total) by mouth 2 (two) times daily with meals. 180 tablet 3 Taking    Omeprazole 40 MG Oral Capsule Delayed Release Take 1 capsule (40 mg total) by mouth Q12H. 180 capsule 3 Taking    levocetirizine 5 MG Oral Tab TAKE 1 TABLET BY MOUTH ONCE DAILY IN THE EVENING 90 tablet 3 Taking    losartan-hydroCHLOROthiazide 100-25 MG Oral Tab Take 1 tablet by mouth daily. 90 tablet 3 Taking    azelastine 0.1 % Nasal Solution 1 spray by Nasal route 2 (two) times daily. (Patient not taking: Reported on 2/4/2025) 3 each 1 Taking Differently    MECLIZINE 25 MG Oral Tab Take 1 tablet by mouth 3  times daily as needed. 20 tablet 0 Taking    Diclofenac Sodium 1 % Transdermal Gel APPLY TO THE AFFECTED AREA TWICE DAILY AS NEEDED 100 g 1 Taking    Mometasone Furoate 0.1 % External Cream External application 2 times daily as directed. 45 g 0 Taking    HYDROcodone-acetaminophen 5-325 MG Oral Tab Take 1 tablet by mouth every 4 (four) hours as needed. (Patient not taking: Reported on 2/4/2025) 20 tablet 0 Not Taking    naproxen 500 MG Oral Tab Take 1 tablet (500 mg total) by mouth 2 (two) times daily with meals. Take with food. Stop if stomach upset. (Patient not taking: Reported on 2/4/2025) 60 tablet 0 Not Taking    Meloxicam 15 MG Oral Tab TAKE 1/2 - 1 tablet BY MOUTH ONCE DAILY prn (Patient not taking: Reported on 2/4/2025) 30 tablet 0 Not Taking   [2]   No current Epic-ordered facility-administered medications on file.     No current Epic-ordered outpatient medications on file.   [3]   Allergies  Allergen Reactions    Shrimp ANAPHYLAXIS    Sulfa Antibiotics RASH, SWELLING and SHORTNESS OF BREATH    Amoxicillin FACE FLUSHING     No skin peeling or blistering or organ involvement     Seasonal OTHER (SEE COMMENTS)     Runny nose, itchy eyes, congestion     Lisinopril Coughing    Rash Away  [[employer] Protective Barrier] RASH

## 2025-02-07 ENCOUNTER — APPOINTMENT (OUTPATIENT)
Dept: PHYSICAL THERAPY | Age: 60
End: 2025-02-07
Attending: ORTHOPAEDIC SURGERY
Payer: COMMERCIAL

## 2025-02-07 RX ORDER — NORTRIPTYLINE HYDROCHLORIDE 10 MG/1
10 CAPSULE ORAL NIGHTLY
Qty: 30 CAPSULE | Refills: 1 | Status: SHIPPED | OUTPATIENT
Start: 2025-02-07

## 2025-02-11 ENCOUNTER — APPOINTMENT (OUTPATIENT)
Dept: PHYSICAL THERAPY | Age: 60
End: 2025-02-11
Attending: ORTHOPAEDIC SURGERY
Payer: COMMERCIAL

## 2025-02-12 ENCOUNTER — OFFICE VISIT (OUTPATIENT)
Dept: DERMATOLOGY CLINIC | Facility: CLINIC | Age: 60
End: 2025-02-12

## 2025-02-12 DIAGNOSIS — L81.9 DYSCHROMIA: ICD-10-CM

## 2025-02-12 DIAGNOSIS — D23.9 BENIGN NEOPLASM OF SKIN, UNSPECIFIED LOCATION: ICD-10-CM

## 2025-02-12 DIAGNOSIS — Z51.81 MEDICATION MONITORING ENCOUNTER: ICD-10-CM

## 2025-02-12 DIAGNOSIS — L30.9 DERMATITIS: Primary | ICD-10-CM

## 2025-02-12 DIAGNOSIS — L29.9 PRURITUS: ICD-10-CM

## 2025-02-12 PROCEDURE — 99203 OFFICE O/P NEW LOW 30 MIN: CPT | Performed by: DERMATOLOGY

## 2025-02-12 RX ORDER — HYDROCORTISONE 25 MG/G
1 CREAM TOPICAL 2 TIMES DAILY
Qty: 30 G | Refills: 1 | Status: SHIPPED | OUTPATIENT
Start: 2025-02-12

## 2025-02-12 RX ORDER — BETAMETHASONE DIPROPIONATE 0.5 MG/G
OINTMENT, AUGMENTED TOPICAL
Qty: 50 G | Refills: 1 | Status: SHIPPED | OUTPATIENT
Start: 2025-02-12 | End: 2025-02-12

## 2025-02-12 RX ORDER — BETAMETHASONE DIPROPIONATE 0.5 MG/G
OINTMENT, AUGMENTED TOPICAL
Qty: 60 G | Refills: 1 | Status: SHIPPED | OUTPATIENT
Start: 2025-02-12

## 2025-02-12 RX ORDER — HYDROQUINONE 40 MG/G
CREAM TOPICAL
Qty: 30 G | Refills: 1 | Status: SHIPPED | OUTPATIENT
Start: 2025-02-12

## 2025-02-12 NOTE — PROGRESS NOTES
The following individual(s) verbally consented to be recorded using ambient AI listening technology and understand that they can each withdraw their consent to this listening technology at any point by asking the clinician to turn off or pause the recording:    Patient name: Lenin Bustamantelatoya  Additional names:

## 2025-02-13 ENCOUNTER — APPOINTMENT (OUTPATIENT)
Dept: PHYSICAL THERAPY | Age: 60
End: 2025-02-13
Attending: ORTHOPAEDIC SURGERY
Payer: COMMERCIAL

## 2025-02-13 ENCOUNTER — TELEPHONE (OUTPATIENT)
Dept: DERMATOLOGY CLINIC | Facility: CLINIC | Age: 60
End: 2025-02-13

## 2025-02-13 NOTE — TELEPHONE ENCOUNTER
Betamethasone Dipropionate Aug 0.05 % External Ointment, Use bid to hands and itchy area on back, Disp: 60 g, Rfl: 1    Key: [id number]

## 2025-02-16 RX ORDER — CLOBETASOL PROPIONATE 0.5 MG/G
OINTMENT TOPICAL
Qty: 60 G | Refills: 1 | Status: SHIPPED | OUTPATIENT
Start: 2025-02-16

## 2025-02-17 NOTE — PROGRESS NOTES
Lenin Rodriguez is a 59 year old female.    Chief Complaint   Patient presents with    Derm Problem     NEW pt presenting for hyperpigmentation to the face ongoing over 1 year uses tranexamic acid- the ordinary. Minimal results     Pruritus     Area to the mid back, bra-line causing itchiness.  Denies redness or irritation.  Has been applying hydrocortisone with minimal relief.      Dryness     Pt would like recommendations for dryness to the bilateral hands, worsening in the winter.  Using Otc lotions with minimal relief.              Shrimp, Sulfa antibiotics, Amoxicillin, Seasonal, Lisinopril, and Rash away  [sensi-care protective barrier]  Current Outpatient Medications   Medication Sig Dispense Refill    hydrocortisone 2.5 % External Cream Apply 1 Application topically 2 (two) times daily. Apply with retinol cream at bedtime 30 g 1    Hydroquinone 4 % External Cream Use at bedtime as directed 30 g 1    Betamethasone Dipropionate Aug 0.05 % External Ointment Use bid to hands and itchy area on back 60 g 1    nortriptyline 10 MG Oral Cap Take 1 capsule (10 mg total) by mouth nightly. 30 capsule 1    cholecalciferol 25 MCG (1000 UT) Oral Tab Take 1 tablet (1,000 Units total) by mouth daily.      Thiamine HCl (VITAMIN B-1 OR) Take by mouth.      magnesium 30 MG Oral Tab Take 1 tablet (30 mg total) by mouth daily.      Potassium Chloride ER 20 MEQ Oral Tab CR Take 20 mEq by mouth 2 (two) times daily. 4 tablet 0    EPINEPHrine (EPIPEN 2-MARLENE) 0.3 MG/0.3ML Injection Solution Auto-injector Inject IM in event of  allergic reaction 1 each 0    montelukast 10 MG Oral Tab Take 1 tablet by mouth 1 time each day in the evening. 90 tablet 3    carvedilol 12.5 MG Oral Tab Take 1 tablet (12.5 mg total) by mouth 2 (two) times daily with meals. 180 tablet 3    Omeprazole 40 MG Oral Capsule Delayed Release Take 1 capsule (40 mg total) by mouth Q12H. 180 capsule 3    levocetirizine 5 MG Oral Tab TAKE 1 TABLET BY MOUTH ONCE  DAILY IN THE EVENING 90 tablet 3    losartan-hydroCHLOROthiazide 100-25 MG Oral Tab Take 1 tablet by mouth daily. 90 tablet 3    MECLIZINE 25 MG Oral Tab Take 1 tablet by mouth 3  times daily as needed. 20 tablet 0    Diclofenac Sodium 1 % Transdermal Gel APPLY TO THE AFFECTED AREA TWICE DAILY AS NEEDED 100 g 1    Mometasone Furoate 0.1 % External Cream External application 2 times daily as directed. 45 g 0    clobetasol 0.05 % External Ointment Use bid 60 g 1    medroxyPROGESTERone Acetate (PROVERA) 10 MG Oral Tab Take 1 tablet (10 mg total) by mouth daily. (Patient not taking: Reported on 2/12/2025) 5 tablet 0    HYDROcodone-acetaminophen 5-325 MG Oral Tab Take 1 tablet by mouth every 4 (four) hours as needed. (Patient not taking: Reported on 2/3/2025) 20 tablet 0    naproxen 500 MG Oral Tab Take 1 tablet (500 mg total) by mouth 2 (two) times daily with meals. Take with food. Stop if stomach upset. (Patient not taking: Reported on 2/3/2025) 60 tablet 0    azelastine 0.1 % Nasal Solution 1 spray by Nasal route 2 (two) times daily. (Patient not taking: Reported on 2/12/2025) 3 each 1    Meloxicam 15 MG Oral Tab TAKE 1/2 - 1 tablet BY MOUTH ONCE DAILY prn (Patient not taking: Reported on 2/3/2025) 30 tablet 0      Past Medical History:    Anemia    Back problem    Back problem    Borderline diabetes    Chronic rhinitis    Dysuria    Esophageal reflux    High blood pressure    History of eye injury    OS, Eye injury from curtain kiki    Osteoarthritis    Palpitations    PMB (postmenopausal bleeding)    Prediabetes    Routine physical examination    Sleep apnea    cpap use daily    Visual impairment    glasses;/contacts      Social History:  Social History     Socioeconomic History    Marital status:    Tobacco Use    Smoking status: Never    Smokeless tobacco: Never   Vaping Use    Vaping status: Never Used   Substance and Sexual Activity    Alcohol use: No     Alcohol/week: 0.0 standard drinks of alcohol     Drug use: No   Other Topics Concern    Caffeine Concern Yes     Comment: 1 cup Coffee    Right Handed Yes    History of tanning Yes    Grew up on a farm No    Outdoor occupation No    Breast feeding No    Reaction to local anesthetic No    Pt has a pacemaker No    Pt has a defibrillator No                 Current Outpatient Medications   Medication Sig Dispense Refill    hydrocortisone 2.5 % External Cream Apply 1 Application topically 2 (two) times daily. Apply with retinol cream at bedtime 30 g 1    Hydroquinone 4 % External Cream Use at bedtime as directed 30 g 1    Betamethasone Dipropionate Aug 0.05 % External Ointment Use bid to hands and itchy area on back 60 g 1    nortriptyline 10 MG Oral Cap Take 1 capsule (10 mg total) by mouth nightly. 30 capsule 1    cholecalciferol 25 MCG (1000 UT) Oral Tab Take 1 tablet (1,000 Units total) by mouth daily.      Thiamine HCl (VITAMIN B-1 OR) Take by mouth.      magnesium 30 MG Oral Tab Take 1 tablet (30 mg total) by mouth daily.      Potassium Chloride ER 20 MEQ Oral Tab CR Take 20 mEq by mouth 2 (two) times daily. 4 tablet 0    EPINEPHrine (EPIPEN 2-MARLENE) 0.3 MG/0.3ML Injection Solution Auto-injector Inject IM in event of  allergic reaction 1 each 0    montelukast 10 MG Oral Tab Take 1 tablet by mouth 1 time each day in the evening. 90 tablet 3    carvedilol 12.5 MG Oral Tab Take 1 tablet (12.5 mg total) by mouth 2 (two) times daily with meals. 180 tablet 3    Omeprazole 40 MG Oral Capsule Delayed Release Take 1 capsule (40 mg total) by mouth Q12H. 180 capsule 3    levocetirizine 5 MG Oral Tab TAKE 1 TABLET BY MOUTH ONCE DAILY IN THE EVENING 90 tablet 3    losartan-hydroCHLOROthiazide 100-25 MG Oral Tab Take 1 tablet by mouth daily. 90 tablet 3    MECLIZINE 25 MG Oral Tab Take 1 tablet by mouth 3  times daily as needed. 20 tablet 0    Diclofenac Sodium 1 % Transdermal Gel APPLY TO THE AFFECTED AREA TWICE DAILY AS NEEDED 100 g 1    Mometasone Furoate 0.1 % External Cream  External application 2 times daily as directed. 45 g 0    clobetasol 0.05 % External Ointment Use bid 60 g 1    medroxyPROGESTERone Acetate (PROVERA) 10 MG Oral Tab Take 1 tablet (10 mg total) by mouth daily. (Patient not taking: Reported on 2/12/2025) 5 tablet 0    HYDROcodone-acetaminophen 5-325 MG Oral Tab Take 1 tablet by mouth every 4 (four) hours as needed. (Patient not taking: Reported on 2/3/2025) 20 tablet 0    naproxen 500 MG Oral Tab Take 1 tablet (500 mg total) by mouth 2 (two) times daily with meals. Take with food. Stop if stomach upset. (Patient not taking: Reported on 2/3/2025) 60 tablet 0    azelastine 0.1 % Nasal Solution 1 spray by Nasal route 2 (two) times daily. (Patient not taking: Reported on 2/12/2025) 3 each 1    Meloxicam 15 MG Oral Tab TAKE 1/2 - 1 tablet BY MOUTH ONCE DAILY prn (Patient not taking: Reported on 2/3/2025) 30 tablet 0     Allergies:   Allergies[1]    Past Medical History:    Anemia    Back problem    Back problem    Borderline diabetes    Chronic rhinitis    Dysuria    Esophageal reflux    High blood pressure    History of eye injury    OS, Eye injury from curtain kiki    Osteoarthritis    Palpitations    PMB (postmenopausal bleeding)    Prediabetes    Routine physical examination    Sleep apnea    cpap use daily    Visual impairment    glasses;/contacts     Past Surgical History:   Procedure Laterality Date    Arthroscopy of joint unlisted Right 12/17/2024    knee    Colonoscopy      Colonoscopy      Egd      Knee replacement surgery      Saleem localization wire 1 site right (cpt=[id number])      Needle biopsy left      Other  05/2021    hysteroscopic polypectomy    Other surgical history Bilateral     fine needle breast biopsy - benign    Stress test scan      Upper gi endoscopy,exam       Social History     Socioeconomic History    Marital status:      Spouse name: Not on file    Number of children: Not on file    Years of education: Not on file    Highest education level:  Not on file   Occupational History    Not on file   Tobacco Use    Smoking status: Never    Smokeless tobacco: Never   Vaping Use    Vaping status: Never Used   Substance and Sexual Activity    Alcohol use: No     Alcohol/week: 0.0 standard drinks of alcohol    Drug use: No    Sexual activity: Not on file   Other Topics Concern     Service Not Asked    Blood Transfusions Not Asked    Caffeine Concern Yes     Comment: 1 cup Coffee    Occupational Exposure Not Asked    Hobby Hazards Not Asked    Sleep Concern Not Asked    Stress Concern Not Asked    Weight Concern Not Asked    Special Diet Not Asked    Back Care Not Asked    Exercise Not Asked    Bike Helmet Not Asked    Seat Belt Not Asked    Self-Exams Not Asked    Left Handed Not Asked    Right Handed Yes    Currently spends a great deal of time in the sun Not Asked    Past Sunlamp Treatments for Acne Not Asked    History of tanning Yes    Hx of Spending Great Deal of Time in Sun Not Asked    Bad sunburns in the past Not Asked    Tanning Salons in the Past Not Asked    Hx of Radiation Treatments Not Asked    Regular use of sun block Not Asked    Grew up on a farm No    Outdoor occupation No    Breast feeding No    Reaction to local anesthetic No    Pt has a pacemaker No    Pt has a defibrillator No   Social History Narrative    Not on file     Social Drivers of Health     Food Insecurity: Not on file   Transportation Needs: Not on file   Stress: Not on file   Housing Stability: Not on file     Family History   Problem Relation Age of Onset    Hypertension Father     Lipids Father     Thyroid disease Father     Hypertension Mother     Lipids Mother     Thyroid disease Mother     Diabetes Maternal Grandmother     Diabetes Maternal Grandfather     Thyroid disease Sister     Thyroid disease Brother     Macular degeneration Neg     Glaucoma Neg                       HPI :      Chief Complaint   Patient presents with    Derm Problem     NEW pt presenting for  hyperpigmentation to the face ongoing over 1 year uses tranexamic acid- the ordinary. Minimal results     Pruritus     Area to the mid back, bra-line causing itchiness.  Denies redness or irritation.  Has been applying hydrocortisone with minimal relief.      Dryness     Pt would like recommendations for dryness to the bilateral hands, worsening in the winter.  Using Otc lotions with minimal relief.      New patient concerned with several issues including hyperpigmentation had used topical tranexamic acid for any send period time without improvement does use sunscreen generally consistently daily has been using retinol over-the-counter intermittently.  Not using thing currently    History of atopy environmental allergies history of dermatitis nose itching especially on back and dryness on hands in the winter worsening this year    History of Present Illness  Lenin Rodriguez is a 59 year old female who presents with persistent skin irritation and discoloration.    She experiences persistent irritation and itchiness around the area of her bra strap. The sensation is constant, but there are no visible lesions or rashes. Scratching the area occasionally results in visible scratch marks. Hydrocortisone provides temporary relief but does not offer a long-term solution. She suspects the irritation might be related to her bra strap.    She is concerned about facial discoloration, describing it as a 'mask' with darkening in certain areas. She has been diligent about using sunscreen and gentle cleansers. Her daughter suggested using tranexamic acid, which she has been applying, but she feels it only provides minimal improvement. No family history of similar skin issues. Occasional small pimples and enlarged oil glands are noted on her face.    She attributes issues with her hands to frequent dishwashing and cleaning. Despite using moisturizers like Vaseline, Corel, and CeraVe, she notices a difference in skin texture,  possibly due to overuse of water.    Patient presents with concerns above.      Past notes/ records and appropriate/relevant lab results including pathology and past body maps reviewed. Updated and new information noted in current visit.       ROS:    Denies any other systemic complaints.  No fevers, chills, night sweats, sensitivity to the sun, deeper lumps or bumps.  No other skin complaints.  History, medications, allergies as noted.    Physical examination: Patient  well-developed well-nourished, alert oriented in no acute distress.  Exam of involved, appropriate areas of skin performed,  Remarkable for lesions as noted   Physical Exam  No suspicious esions or abnormalities.    Dyschromia over the face forehead cheeks patchy areas consistent with melasma, actinic damage    Eczematous changes on hands area of itching on back consistent with notalgia paresthetica  See map for details     ASSESSMENT AND PLAN:     Encounter Diagnoses   Name Primary?    Dermatitis Yes    Pruritus     Dyschromia     Medication monitoring encounter     Benign neoplasm of skin, unspecified location      Meds & Refills for this Visit:   Requested Prescriptions     Signed Prescriptions Disp Refills    hydrocortisone 2.5 % External Cream 30 g 1     Sig: Apply 1 Application topically 2 (two) times daily. Apply with retinol cream at bedtime    Hydroquinone 4 % External Cream 30 g 1     Sig: Use at bedtime as directed    Betamethasone Dipropionate Aug 0.05 % External Ointment 60 g 1     Sig: Use bid to hands and itchy area on back       No orders of the defined types were placed in this encounter.    Assessment / plan:    Assessment & Plan  Notalgia Paresthetica  Chronic itching and irritation localized to the right upper back, likely due to nerve irritation. No visible lesions, but scratch marks present. Hydrocortisone provides temporary relief. Discussed use of moisturizers with pramoxine and topical steroids for severe episodes. Explained  these treatments manage symptoms but may not be permanent solutions.  - Recommend moisturizers with pramoxine.  - Prescribe topical steroids for severe episodes.    Patient with history of atopy  Atopic Derm    Hand Dermatitis  Chronic hand dermatitis likely due to frequent exposure to water and irritants from dishwashing and cleaning. Skin shows signs of overuse and irritation. Discussed use of stronger topical steroids and importance of moisturizing. Explained same topical steroid can be used on back and hands, with less frequent application on the back.  - Prescribe stronger topical steroid for hands.  - Advise using same topical steroid on back and hands, with less frequent application on back.    Melasma  Hyperpigmentation on the face, likely melasma. Using sunscreen and tranexamic acid with some improvement. No family history of similar conditions. Discussed use of over-the-counter retinol products, hydroquinone bleaching cream, and prescription-strength hydrocortisone. Explained treatments may take months to show results and may cause irritation. Advised alternating retinol and hydrocortisone one night, and hydroquinone the next night. Discussed GoodRx for cost savings on prescriptions.  - Continue using tranexamic acid.  - Recommend over-the-counter retinol products such as REY Deep Wrinkle Night Cream or generic equivalents.  - Prescribe hydroquinone bleaching cream.  - Prescribe prescription-strength hydrocortisone for face.  - Advise alternating retinol and hydrocortisone one night, and hydroquinone the next night.  - Discuss GoodRx for cost savings on prescriptions.    No other susupicious lesions on todays  exam.    Monitor for new or changing lesions. Signs and symptoms of skin cancer, ABCDE's of melanoma ( additional information available at AAD.org, skincancer.org) Encourage Sunscreen (broad-spectrum, ideally mineral-based-UVA/UVB -SPF 30 or higher) use encouraged, sun protection/sun protective  clothing, self exams reviewed Followup as noted RTC ---routine checkup 6 mos -one year or p.r.n.    Encounter Times   Including precharting, reviewing chart, prior notes obtaining history: 10 minutes, medical exam :10 minutes, notes on body map, plan, counseling 10minutes My total time spent caring for the patient on the day of the encounter: 30 minutes     The patient indicates understanding of these issues and agrees to the plan.  The patient is asked to return as noted in follow-up/ above.    This note was generated using Dragon voice recognition software.  Please contact me regarding any confusion resulting from errors in recognition..  Note to patient and family: The 21st Century Cures Act makes medical notes like these available to patients. However, be advised this is a medical document. It is intended as [id number] communication and monitoring of a patient's care needs. It is written in medical language and may contain abbreviations or verbiage that are unfamiliar. It may appear blunt or direct. Medical documents are intended to carry relevant information, facts as evident and the clinical opinion of the practitioner.      No orders of the defined types were placed in this encounter.          Encounter Diagnoses   Name Primary?    Dermatitis Yes    Pruritus     Dyschromia     Medication monitoring encounter     Benign neoplasm of skin, unspecified location        No orders of the defined types were placed in this encounter.      Results From Past 48 Hours:  No results found for this or any previous visit (from the past 48 hours).    Meds This Visit:      Imaging Orders:  None     Referral Orders:  No orders of the defined types were placed in this encounter.                 [1]   Allergies  Allergen Reactions    Shrimp ANAPHYLAXIS    Sulfa Antibiotics RASH, SWELLING and SHORTNESS OF BREATH    Amoxicillin FACE FLUSHING     No skin peeling or blistering or organ involvement     Seasonal OTHER (SEE COMMENTS)      Runny nose, itchy eyes, congestion     Lisinopril Coughing    Rash Away  [Sensi-Care Protective Barrier] RASH

## 2025-02-18 ENCOUNTER — TELEPHONE (OUTPATIENT)
Dept: DERMATOLOGY CLINIC | Facility: CLINIC | Age: 60
End: 2025-02-18

## 2025-02-18 NOTE — TELEPHONE ENCOUNTER
LOV 2/12/2025.  PA required for clobetasol as well.  Would you like to proceed or new rx? Please advise, ty.

## 2025-02-18 NOTE — TELEPHONE ENCOUNTER
Medication PA Requested: clobetasol 0.05 % External Ointment                                                          CoverMyMeds Used:  Key: [id number]   Quantity: 60 g  Day Supply:  Sig: Use bid   DX Code:   Pruritus L29.9      Dermatitis L30.9                              CPT code (if applicable):   Case Number/Pending Ref#:

## 2025-02-28 ENCOUNTER — OFFICE VISIT (OUTPATIENT)
Dept: PHYSICAL THERAPY | Age: 60
End: 2025-02-28
Attending: ORTHOPAEDIC SURGERY
Payer: COMMERCIAL

## 2025-02-28 PROCEDURE — 97530 THERAPEUTIC ACTIVITIES: CPT

## 2025-02-28 PROCEDURE — 97110 THERAPEUTIC EXERCISES: CPT

## 2025-02-28 PROCEDURE — 97112 NEUROMUSCULAR REEDUCATION: CPT

## 2025-02-28 NOTE — PROGRESS NOTES
Patient: Lenin Rodriguez (59 year old, female) Referring Provider:  Insurance:   Diagnosis: Primary osteoarthritis of both knees (M17.0) Alex MOULTON INSURED BY [employer]   Date of Surgery: 12/17/2024 Next MD visit:  N/A   Precautions:  None No data recorded Referral Information:    Date of Evaluation: Req: 6, Auth: 6, Exp: 1/28/2026 01/28/25 POC Auth Visits:          Today's Date   2/28/2025    Subjective  Patient states that she cancelled all of her last appointments because of the cold. Patient states that she did not want to leave her home. Will plan to start again after Ramadan. Has been doing the stretching and has been spacing out activities.       Pain:  4/10 p* bilateral knees, 6/10 p* shoulders, 7-8/10 p* hands      Objective       Ambulation: significant pronation bilaterally      Assessment  Patient returning to physical therapy after 3 week gap in care. Patient continues to present with significant pronation bilaterally during ambulation. Focused on foot tripod positioning combined with upper and lower extremity loading to incorporate intrinsic foot muscle training.    Goals (to be met in 8 visits)   Goals       Therapy Goals     Patient will improve LEFS by at least 9 points to demonstrate minimally clinically important difference in patient reported outcome measure for knee pain.   Patient will be able to ambulate for at least 1/2 mile without low back pain or right knee pain to facilitate independence with community ambulation.   Patient will be able to ascend/descend at least 1 flight of stairs without right knee pain to facilitate independence with stair negotiation.   Patient will be able to stand for at least 30 minutes without right knee pain to facilitate independence with ADLs and IADLs including but not limited to cooking.               Plan  1-2x/week for 5 visits, review HEP    Treatment Last 4 Visits       1/28/2025 2/4/2025 2/28/2025   PT Treatment   Treatment Day   2 3   Therapeutic Exercise - Pt education was provided on exam findings, treatment diagnosis, treatment plan, expectations, and prognosis.  - Prone press up on elbows x10  - Muscle performance assessment (see objective)  - Functional ankle DF (see objective)  - Ambulation (see objective) - Ambulation assessment (see objective)  - Glute bridge x6, x10  - Foot tripod positioning + heel raise x8, discontinued 2/2 low back p*  - Foot tripod positioning + row 2x10 with blue tubing, x10 with black tubing     Neuro Re-Ed   - Foot tripod positioning   - Glute bridge isometric x1 min   - Glute bridge + PPT x5  - Glute bridge + PPT + foot tripod positioning isometric to fatigue  - R/L sidelying clam isometric with RTB to fatigue, significant cueing    Manual Therapy - R L5/S1 UPA gr III x5 min // 2-3/10 low back p* ambulation   *Increased time to re-assess  - Lumbar PAIVM exam (see objective)  - R L4/5 UPA gr III x4 min // 2/10 p* ambulation    Therapeutic Activity   - Foot tripod + STS with UE support x5, without UE support 2x5   Therapeutic Exercise Min 8 23 23   Neuro Re-Ed Min   23   Manual Therapy Min 8 17    Therapeutic Activity Min   8   Evaluation Min 44     Total of Timed Procedures 16 40 54   Total of Service Based 44 0 0   Total Treatment Time 60 40 54         HEP  Prone press up on elbows, glute bridge isometrics, foot tripod + STS, foot tripod + standing row, R/L sidelying clam with RTB    Charges     NMRE 2, EX 2, TA 1    Gudelia CARIAS PT, 02/28/25, 9:02 AM

## 2025-03-21 RX ORDER — HYDROQUINONE 40 MG/G
CREAM TOPICAL
Qty: 28.35 G | Refills: 1 | Status: SHIPPED | OUTPATIENT
Start: 2025-03-21

## 2025-03-21 NOTE — TELEPHONE ENCOUNTER
Refill Request for medication(s):   Hydroquinone 4 % External Cream       Last Office Visit: 02/12/25    Last Refill: 02/12/25    Pharmacy, Dosage verified:   [id number] [place] [place] [address] [facility] [phone], [phone]       Condition Update (if applicable): Trevor ms sent for patient condition update.     Rx pended and sent to provider for approval, please advise. Thank You!

## 2025-03-27 ENCOUNTER — TELEPHONE (OUTPATIENT)
Dept: DERMATOLOGY CLINIC | Facility: CLINIC | Age: 60
End: 2025-03-27

## 2025-04-02 ENCOUNTER — APPOINTMENT (OUTPATIENT)
Dept: PHYSICAL THERAPY | Age: 60
End: 2025-04-02
Attending: ORTHOPAEDIC SURGERY
Payer: COMMERCIAL

## 2025-04-07 ENCOUNTER — APPOINTMENT (OUTPATIENT)
Dept: PHYSICAL THERAPY | Age: 60
End: 2025-04-07
Attending: ORTHOPAEDIC SURGERY
Payer: COMMERCIAL

## 2025-04-11 ENCOUNTER — APPOINTMENT (OUTPATIENT)
Dept: PHYSICAL THERAPY | Age: 60
End: 2025-04-11
Attending: ORTHOPAEDIC SURGERY
Payer: COMMERCIAL

## 2025-05-05 ENCOUNTER — TELEPHONE (OUTPATIENT)
Dept: CASE MANAGEMENT | Age: 60
End: 2025-05-05

## 2025-05-05 DIAGNOSIS — M25.569 KNEE PAIN, UNSPECIFIED CHRONICITY, UNSPECIFIED LATERALITY: Primary | ICD-10-CM

## 2025-05-06 ENCOUNTER — OFFICE VISIT (OUTPATIENT)
Dept: OBGYN CLINIC | Facility: CLINIC | Age: 60
End: 2025-05-06

## 2025-05-06 ENCOUNTER — HOSPITAL ENCOUNTER (OUTPATIENT)
Dept: GENERAL RADIOLOGY | Facility: HOSPITAL | Age: 60
Discharge: HOME | End: 2025-05-06
Attending: ORTHOPAEDIC SURGERY
Payer: COMMERCIAL

## 2025-05-06 ENCOUNTER — OFFICE VISIT (OUTPATIENT)
Age: 60
End: 2025-05-06
Payer: COMMERCIAL

## 2025-05-06 VITALS
HEIGHT: 63 IN | WEIGHT: 191.38 LBS | DIASTOLIC BLOOD PRESSURE: 85 MMHG | HEART RATE: 65 BPM | BODY MASS INDEX: 33.91 KG/M2 | SYSTOLIC BLOOD PRESSURE: 149 MMHG

## 2025-05-06 VITALS — WEIGHT: 191 LBS | HEIGHT: 63.5 IN | BODY MASS INDEX: 33.42 KG/M2

## 2025-05-06 DIAGNOSIS — Z01.419 ENCOUNTER FOR GYNECOLOGICAL EXAMINATION WITHOUT ABNORMAL FINDING: Primary | ICD-10-CM

## 2025-05-06 DIAGNOSIS — Z12.31 VISIT FOR SCREENING MAMMOGRAM: ICD-10-CM

## 2025-05-06 DIAGNOSIS — M17.0 PRIMARY OSTEOARTHRITIS OF BOTH KNEES: ICD-10-CM

## 2025-05-06 DIAGNOSIS — R52 PAIN: Primary | ICD-10-CM

## 2025-05-06 DIAGNOSIS — R52 PAIN: ICD-10-CM

## 2025-05-06 PROCEDURE — 99214 OFFICE O/P EST MOD 30 MIN: CPT | Performed by: ORTHOPAEDIC SURGERY

## 2025-05-06 PROCEDURE — 99396 PREV VISIT EST AGE 40-64: CPT | Performed by: OBSTETRICS & GYNECOLOGY

## 2025-05-06 PROCEDURE — 73564 X-RAY EXAM KNEE 4 OR MORE: CPT | Performed by: ORTHOPAEDIC SURGERY

## 2025-05-06 NOTE — PROGRESS NOTES
The following individual(s) verbally consented to be recorded using ambient AI listening technology and understand that they can each withdraw their consent to this listening technology at any point by asking the clinician to turn off or pause the recording:    Patient name: Lenin Adamsmary ellen Rodriguez   132

## 2025-05-07 NOTE — PROGRESS NOTES
Chief Complaint: Knee Pain (B/l knee - Rigth knee is worse- Pt had Right knee arthroscopy by Dr. Bravo on 12/2024.  Rates pain 5-/10. Pt had cortisone injection and aspiration to right knee on 01/08 with some relief. Pain radiates down to top of feet. )      HPI  Ms. Rodriguez is referred by No ref. provider found. Lenin Rodriguez is a 59 year old female being seen in the office today for Knee Pain (B/l knee - Rigth knee is worse- Pt had Right knee arthroscopy by Dr. Bravo on 12/2024.  Rates pain 5-/10. Pt had cortisone injection and aspiration to right knee on 01/08 with some relief. Pain radiates down to top of feet. )    Patient is a 59-year-old female presents today with complaints of bilateral knee pain.  Right is worse than left.  She states that she has been dealing with this for about a year.  She was actually seen Dr. Wilson's with OS.  She was diagnosed with a medial meniscus tear on the right.  She underwent right knee arthroscopy as well as partial medial meniscectomy in December 2024.  She had a few weeks of pain relief.  About a month later she noted swelling and pain.  She presented to the office and had her knee aspirated and had a cortisone injection.  She was diagnosed with osteoarthritis at that time.  She continues to have off-and-on pain.  She also notes pain in the left knee.  He localizes the pain medially as well as anteriorly.  She denies any mechanical symptoms.  She denies any instability symptoms.  She does occasionally have some radiating pain into the shin and top of the foot.  Denies any numbness or tingling.  Denies any back or hip pain.  She notes pain mostly when she is walking, navigating stairs, and kneeling.  Treatment thus far has included activity modification, anti-inflammatory medication, cortisone injections, physical therapy, and knee arthroscopy.  She is a homemaker.    History:  Past Medical History[1]  Past Surgical History[2]  Family History[3]  Family Status    Relation Status    Fa Alive    Mo Alive    MGMA (Not Specified)    MGFA (Not Specified)    Sis (Not Specified)    Bro (Not Specified)    NEG (Not Specified)     Social History     Occupational History    Not on file   Tobacco Use    Smoking status: Never    Smokeless tobacco: Never   Vaping Use    Vaping status: Never Used   Substance and Sexual Activity    Alcohol use: No     Alcohol/week: 0.0 standard drinks of alcohol    Drug use: No    Sexual activity: Yes       Medications:  Current Medications[4]    Allergies:  Allergies[5]    Review of Systems  A comprehensive review of systems was completed and is negative unless noted above or in the HPI.    Physical Exam  Ht 5' 3.5\" (1.613 m)   Wt 191 lb (86.6 kg)   LMP 05/08/2015   BMI 33.30 kg/m²   Body mass index is 33.3 kg/m².    Constitutional: The patient appears well-developed and well-nourished, in no apparent distress.   Psychiatric: The patient demonstrates good comprehension, judgment and decision making. Normal mood and affect.  Eyes: PER and EOM are normal.  ENT: Hearing appropriate for normal conversation.  Cardiovascular: The patient has symmetric pulses, 2+.  Distal extremity is warm and well perfused with good capillary refill.  Respiratory:  The patient is breathing comfortably without increased respiratory effort or use of accessory muscles.  Hematologic/Lymphatic: No lymphangitis. There is no appreciable enlargement of lymph nodes. No calf swelling, calf non-tender, negative Cuevas's sign. No edema.  Skin: No wounds or ulcers. No hypertrophic scarring.  Neck: FROM without pain.  MSK:  Gait: Antalgic to the right    bilateral Knee        Alignment: Mild varus on right, neutral left       Skin: Intact at the knee, varicosities are noted.       Swelling: Mild on right, none on left   Effusion: Positive   Tenderness: Medial joint line, Lateral joint line, and Patella       Range of Motion: R/L     Extension: 5/0     Flexion: 105/115     Flexion  Contracture: 5/0     Extensor La           McMurrays: Negative   Lachmann: Negative   Anterior Drawer: Negative   Posterior Drawer: Negative   Varus Stress Test: stable   Valgus Stress Test: stable       Patellar Tracking: Centrally   Patellar Crepitus: No   Extensor Mechanism: Intact       Hip ROM:   Intact       Sensation: intact   Motor: intact   Vascular: intact     Imaging:  I independently reviewed the patient's relevant imaging studies today.    4 weightbearing views of the affected knee were obtained today.  They demonstrate no acute fracture or dislocation.  They show medial compartment joint space narrowing and osteophyte formation consistent with Kellgren-Stoney grade 2-3 osteoarthritis.    Labs:  Lab Results   Component Value Date    WBC 6.5 2024    HGB 12.2 2024    HCT 39.4 2024    .0 2024     Lab Results   Component Value Date    ALB 4.4 2024    A1C 6.9 (H) 2024     Lab Results   Component Value Date     (H) 2024     (H) 2024     (H) 2022       Assessment and Plan  Assessment & Plan  Pain    Orders:    XR KNEE COMPLETE BILAT (CPT=73564); Future    Primary osteoarthritis of both knees    Orders:    MISC [facility] PROC FOR MANAGED CARE AUTH    I had a lengthy discussion with the patient today about the patient's knee OA.  Nonoperative and operative options for knee OA were discussed with the patient at length. Nonoperative measures include activity modification, anti-inflammatories, weight loss if applicable, physical therapy or a home exercise program, bracing, cortisone injections and viscosupplementation injections.  Risks and benefits to injections were reviewed including but not limited to infection, skin discoloration/changes, temporary elevations in blood sugars, joint inflammatory reaction, persistent pain or increased pain at the injection site, and the chance that the injection may not help. If they fail  these measures, and continue to suffer from functionally limiting pain that is negatively impacting their quality of life, a knee replacement can be considered.   Understanding all options, the patient elected for HA injections.  Specifically, I recommend the following:    Referral for bilateral knee HA injections was placed.  In the interim continue activity modification, Tylenol/anti-inflammatories, cryotherapy, weight loss, use of ambulatory aids as needed for symptomatic relief.  They were recommended low impact aerobic exercises such as biking, elliptical, and swimming/water therapy. They were recommended against high impact activities such as running on hard surfaces and deep squatting activities.  They were instructed on the benefits of weight loss, based on their BMI which is high today.  There are no structural abnormalities to their knee, so the patient can be WBAT and activities as tolerated.  Follow up: For HA injections  The patient understands and agrees with this plan. All of the patient's questions were answered today.      BMI is is above average; BMI management plan is completed    Leonel Rodriguez MD         [1]   Past Medical History:   Anemia    Back problem    Back problem    Borderline diabetes    Chronic rhinitis    Dysuria    Esophageal reflux    High blood pressure    History of eye injury    OS, Eye injury from curtain kiki    Osteoarthritis    Palpitations    PMB (postmenopausal bleeding)    Prediabetes    Routine physical examination    Sleep apnea    cpap use daily    Visual impairment    glasses;/contacts   [2]   Past Surgical History:  Procedure Laterality Date    Arthroscopy of joint unlisted Right 12/17/2024    knee    Colonoscopy      Colonoscopy      Egd      Knee replacement surgery      Saleem localization wire 1 site right (cpt=19281)      Needle biopsy left      Other  05/2021    hysteroscopic polypectomy    Other surgical history Bilateral     fine needle breast biopsy - benign     Stress test scan      Upper gi endoscopy,exam     [3]   Family History  Problem Relation Age of Onset    Hypertension Father     Lipids Father     Thyroid disease Father     Hypertension Mother     Lipids Mother     Thyroid disease Mother     Diabetes Maternal Grandmother     Diabetes Maternal Grandfather     Thyroid disease Sister     Thyroid disease Brother     Macular degeneration Neg     Glaucoma Neg    [4]   Current Outpatient Medications   Medication Sig Dispense Refill    triamcinolone 0.1 % External Ointment Apply 1 Application topically 2 (two) times daily. 90 g 0    Hydroquinone 4 % External Cream Use at bedtime as directed 28.35 g 1    clobetasol 0.05 % External Ointment Use bid 60 g 1    hydrocortisone 2.5 % External Cream Apply 1 Application topically 2 (two) times daily. Apply with retinol cream at bedtime 30 g 1    Betamethasone Dipropionate Aug 0.05 % External Ointment Use bid to hands and itchy area on back 60 g 1    nortriptyline 10 MG Oral Cap Take 1 capsule (10 mg total) by mouth nightly. 30 capsule 1    cholecalciferol 25 MCG (1000 UT) Oral Tab Take 1 tablet (1,000 Units total) by mouth daily.      medroxyPROGESTERone Acetate (PROVERA) 10 MG Oral Tab Take 1 tablet (10 mg total) by mouth daily. (Patient not taking: Reported on 5/6/2025) 5 tablet 0    HYDROcodone-acetaminophen 5-325 MG Oral Tab Take 1 tablet by mouth every 4 (four) hours as needed. (Patient not taking: Reported on 5/6/2025) 20 tablet 0    Thiamine HCl (VITAMIN B-1 OR) Take by mouth.      magnesium 30 MG Oral Tab Take 1 tablet (30 mg total) by mouth daily.      Potassium Chloride ER 20 MEQ Oral Tab CR Take 20 mEq by mouth 2 (two) times daily. 4 tablet 0    EPINEPHrine (EPIPEN 2-MARLENE) 0.3 MG/0.3ML Injection Solution Auto-injector Inject IM in event of  allergic reaction 1 each 0    naproxen 500 MG Oral Tab Take 1 tablet (500 mg total) by mouth 2 (two) times daily with meals. Take with food. Stop if stomach upset. (Patient not  taking: Reported on 5/6/2025) 60 tablet 0    montelukast 10 MG Oral Tab Take 1 tablet by mouth 1 time each day in the evening. 90 tablet 3    carvedilol 12.5 MG Oral Tab Take 1 tablet (12.5 mg total) by mouth 2 (two) times daily with meals. 180 tablet 3    Omeprazole 40 MG Oral Capsule Delayed Release Take 1 capsule (40 mg total) by mouth Q12H. 180 capsule 3    levocetirizine 5 MG Oral Tab TAKE 1 TABLET BY MOUTH ONCE DAILY IN THE EVENING 90 tablet 3    losartan-hydroCHLOROthiazide 100-25 MG Oral Tab Take 1 tablet by mouth daily. 90 tablet 3    azelastine 0.1 % Nasal Solution 1 spray by Nasal route 2 (two) times daily. (Patient not taking: Reported on 5/6/2025) 3 each 1    MECLIZINE 25 MG Oral Tab Take 1 tablet by mouth 3  times daily as needed. 20 tablet 0    Meloxicam 15 MG Oral Tab TAKE 1/2 - 1 tablet BY MOUTH ONCE DAILY prn (Patient not taking: Reported on 5/6/2025) 30 tablet 0    Diclofenac Sodium 1 % Transdermal Gel APPLY TO THE AFFECTED AREA TWICE DAILY AS NEEDED 100 g 1    Mometasone Furoate 0.1 % External Cream External application 2 times daily as directed. 45 g 0   [5]   Allergies  Allergen Reactions    Shrimp ANAPHYLAXIS    Sulfa Antibiotics RASH, SWELLING and SHORTNESS OF BREATH    Amoxicillin FACE FLUSHING     No skin peeling or blistering or organ involvement     Seasonal OTHER (SEE COMMENTS)     Runny nose, itchy eyes, congestion     Lisinopril Coughing    Rash Away  [[employer]-Care Protective Barrier] RASH

## 2025-05-12 NOTE — PROGRESS NOTES
Lenin Rodriguez is a 59 year old female  Patient's last menstrual period was 2015.   Chief Complaint   Patient presents with    Gyn Exam     ANNUAL EXAM, mammogram ORDER   .  She has no complaints.  Denies postmenopausal bleeding, urinary or sexual issues.  History of Present Illness  Lenin Rodriguze is a 59 year old female who presents for her annual gynecologic exam and follow-up on postmenopausal bleeding.    Her last gynecologic exam was in 2021. She underwent an evaluation for postmenopausal bleeding in February, which was negative. She declined Provera treatment at that time due to recent knee surgery. No further bleeding has occurred since then.    She has noticed a slight white milky discharge from her left breast, described as 'a little caked over piece' that resolved. No bloody or green discharge is present. Occasionally, she experiences a tingling sensation in the breast. She has dense breast tissue and is considering a whole breast ultrasound with her upcoming mammogram, last done in 2024.    She engages in daily walking for half an hour with her  as part of her physical activity, but is cautious due to knee problems. She had knee surgery recently and has been less active physically since then. Weight management is challenging due to knee pain limiting her activity.    She performs breast self-exams irregularly, approximately once every four months, and acknowledges the need to do them monthly. Dense breast tissue has prompted more vigilant monitoring.    She had a Cologuard stool test about two years ago but is unsure about the frequency of this screening. She does not swim or ride a bike currently due to knee discomfort.      OBSTETRICS HISTORY:     OB History    Para Term  AB Living   5 4 4 0 1 4   SAB IAB Ectopic Multiple Live Births   1 0 0 0 4      # Outcome Date GA Lbr Dexter/2nd Weight Sex Type Anes PTL Lv   5 Term      NORMAL SPONT    TALITA   4 SAB            3 Term      NORMAL SPONT   TALITA   2 Term      NORMAL SPONT   TALITA   1 Term      NORMAL SPONT   TALITA       GYNE HISTORY:     Periods none due to menopause        Latest Ref Rng & Units 2024     1:03 PM 2020    11:34 AM 10/19/2017    11:23 AM   RECENT PAP RESULTS   INTERPRETATION/RESULT: Negative for intraepithelial lesion or malignancy Negative for intraepithelial lesion or malignancy  Negative for intraepithelial lesion or malignancy  Negative for intraepithelial lesion or malignancy    HPV Negative Negative  Negative  Negative          MEDICAL HISTORY:     Past Medical History:    Anemia    Back problem    Back problem    Borderline diabetes    Chronic rhinitis    Dysuria    Esophageal reflux    High blood pressure    History of eye injury    OS, Eye injury from curtain kiki    Osteoarthritis    Palpitations    PMB (postmenopausal bleeding)    Prediabetes    Routine physical examination    Sleep apnea    cpap use daily    Visual impairment    glasses;/contacts     Past Surgical History:   Procedure Laterality Date    Arthroscopy of joint unlisted Right 2024    knee    Colonoscopy      Colonoscopy      Egd      Knee replacement surgery      Saleem localization wire 1 site right (cpt=19281)      Needle biopsy left      Other  2021    hysteroscopic polypectomy    Other surgical history Bilateral     fine needle breast biopsy - benign    Stress test scan      Upper gi endoscopy,exam       OB History    Para Term  AB Living   5 4 4 0 1 4   SAB IAB Ectopic Multiple Live Births   1 0 0 0 4        SOCIAL HISTORY:     Social History     Socioeconomic History    Marital status:      Spouse name: Not on file    Number of children: Not on file    Years of education: Not on file    Highest education level: Not on file   Occupational History    Not on file   Tobacco Use    Smoking status: Never    Smokeless tobacco: Never   Vaping Use    Vaping status: Never Used    Substance and Sexual Activity    Alcohol use: No     Alcohol/week: 0.0 standard drinks of alcohol    Drug use: No    Sexual activity: Yes   Other Topics Concern     Service Not Asked    Blood Transfusions Not Asked    Caffeine Concern Yes     Comment: 1 cup Coffee    Occupational Exposure Not Asked    Hobby Hazards Not Asked    Sleep Concern Not Asked    Stress Concern Not Asked    Weight Concern Not Asked    Special Diet Not Asked    Back Care Not Asked    Exercise Not Asked    Bike Helmet Not Asked    Seat Belt Not Asked    Self-Exams Not Asked    Left Handed Not Asked    Right Handed Yes    Currently spends a great deal of time in the sun Not Asked    Past Sunlamp Treatments for Acne Not Asked    History of tanning Yes    Hx of Spending Great Deal of Time in Sun Not Asked    Bad sunburns in the past Not Asked    Tanning Salons in the Past Not Asked    Hx of Radiation Treatments Not Asked    Regular use of sun block Not Asked    Grew up on a farm No    Outdoor occupation No    Breast feeding No    Reaction to local anesthetic No    Pt has a pacemaker No    Pt has a defibrillator No   Social History Narrative    Not on file     Social Drivers of Health     Food Insecurity: Not on file   Transportation Needs: Not on file   Stress: Not on file   Housing Stability: Not on file       FAMILY HISTORY:     Family History   Problem Relation Age of Onset    Hypertension Father     Lipids Father     Thyroid disease Father     Hypertension Mother     Lipids Mother     Thyroid disease Mother     Diabetes Maternal Grandmother     Diabetes Maternal Grandfather     Thyroid disease Sister     Thyroid disease Brother     Macular degeneration Neg     Glaucoma Neg        MEDICATIONS:       Current Outpatient Medications:     triamcinolone 0.1 % External Ointment, Apply 1 Application topically 2 (two) times daily., Disp: 90 g, Rfl: 0    Hydroquinone 4 % External Cream, Use at bedtime as directed, Disp: 28.35 g, Rfl: 1     clobetasol 0.05 % External Ointment, Use bid, Disp: 60 g, Rfl: 1    hydrocortisone 2.5 % External Cream, Apply 1 Application topically 2 (two) times daily. Apply with retinol cream at bedtime, Disp: 30 g, Rfl: 1    Betamethasone Dipropionate Aug 0.05 % External Ointment, Use bid to hands and itchy area on back, Disp: 60 g, Rfl: 1    nortriptyline 10 MG Oral Cap, Take 1 capsule (10 mg total) by mouth nightly., Disp: 30 capsule, Rfl: 1    cholecalciferol 25 MCG (1000 UT) Oral Tab, Take 1 tablet (1,000 Units total) by mouth daily., Disp: , Rfl:     Thiamine HCl (VITAMIN B-1 OR), Take by mouth., Disp: , Rfl:     magnesium 30 MG Oral Tab, Take 1 tablet (30 mg total) by mouth daily., Disp: , Rfl:     Potassium Chloride ER 20 MEQ Oral Tab CR, Take 20 mEq by mouth 2 (two) times daily., Disp: 4 tablet, Rfl: 0    EPINEPHrine (EPIPEN 2-MARLENE) 0.3 MG/0.3ML Injection Solution Auto-injector, Inject IM in event of  allergic reaction, Disp: 1 each, Rfl: 0    montelukast 10 MG Oral Tab, Take 1 tablet by mouth 1 time each day in the evening., Disp: 90 tablet, Rfl: 3    carvedilol 12.5 MG Oral Tab, Take 1 tablet (12.5 mg total) by mouth 2 (two) times daily with meals., Disp: 180 tablet, Rfl: 3    Omeprazole 40 MG Oral Capsule Delayed Release, Take 1 capsule (40 mg total) by mouth Q12H., Disp: 180 capsule, Rfl: 3    levocetirizine 5 MG Oral Tab, TAKE 1 TABLET BY MOUTH ONCE DAILY IN THE EVENING, Disp: 90 tablet, Rfl: 3    losartan-hydroCHLOROthiazide 100-25 MG Oral Tab, Take 1 tablet by mouth daily., Disp: 90 tablet, Rfl: 3    MECLIZINE 25 MG Oral Tab, Take 1 tablet by mouth 3  times daily as needed., Disp: 20 tablet, Rfl: 0    Diclofenac Sodium 1 % Transdermal Gel, APPLY TO THE AFFECTED AREA TWICE DAILY AS NEEDED, Disp: 100 g, Rfl: 1    Mometasone Furoate 0.1 % External Cream, External application 2 times daily as directed., Disp: 45 g, Rfl: 0    medroxyPROGESTERone Acetate (PROVERA) 10 MG Oral Tab, Take 1 tablet (10 mg total) by mouth  daily. (Patient not taking: Reported on 5/6/2025), Disp: 5 tablet, Rfl: 0    HYDROcodone-acetaminophen 5-325 MG Oral Tab, Take 1 tablet by mouth every 4 (four) hours as needed. (Patient not taking: Reported on 5/6/2025), Disp: 20 tablet, Rfl: 0    naproxen 500 MG Oral Tab, Take 1 tablet (500 mg total) by mouth 2 (two) times daily with meals. Take with food. Stop if stomach upset. (Patient not taking: Reported on 5/6/2025), Disp: 60 tablet, Rfl: 0    azelastine 0.1 % Nasal Solution, 1 spray by Nasal route 2 (two) times daily. (Patient not taking: Reported on 5/6/2025), Disp: 3 each, Rfl: 1    Meloxicam 15 MG Oral Tab, TAKE 1/2 - 1 tablet BY MOUTH ONCE DAILY prn (Patient not taking: Reported on 5/6/2025), Disp: 30 tablet, Rfl: 0    ALLERGIES:       Allergies   Allergen Reactions    Shrimp ANAPHYLAXIS    Sulfa Antibiotics RASH, SWELLING and SHORTNESS OF BREATH    Amoxicillin FACE FLUSHING     No skin peeling or blistering or organ involvement     Seasonal OTHER (SEE COMMENTS)     Runny nose, itchy eyes, congestion     Lisinopril Coughing    Rash Away  [Sensi-Care Protective Barrier] RASH         REVIEW OF SYSTEMS:     Constitutional:    denies fever / chills  Eyes:     denies blurred or double vision  Cardiovascular:  denies chest pain or palpitations  Respiratory:    denies shortness of breath  Gastrointestinal:  denies severe abdominal pain, frequent diarrhea or constipation, nausea / vomiting  Genitourinary:    denies dysuria, bothersome incontinence  Skin/Breast:   denies any breast pain, lumps, or discharge  Neurological:    denies frequent severe headaches  Psychiatric:   denies depression or anxiety, thoughts of harming self or others  Heme/Lymph:    denies easy bruising or bleeding    PHYSICAL EXAM:   Blood pressure 149/85, pulse 65, height 5' 3\" (1.6 m), weight 191 lb 6.4 oz (86.8 kg), last menstrual period 05/08/2015, not currently breastfeeding.  Physical Exam        Constitutional:  well developed, well  nourished  Head/Face:  normocephalic  Neck/Thyroid:  thyroid symmetric, no thyromegaly, no nodules, no adenopathy  Lymphatic: no abnormal supraclavicular or axillary adenopathy is noted  Breast:   normal without palpable masses, tenderness, asymmetry, nipple discharge, nipple retraction or skin changes  Respiratory:   nonlabored breathing  Cardiovascular: regular rate and rhythm  Abdomen:   soft, nontender, nondistended, no masses  Skin/Hair: no unusual rashes or bruises  Extremities:  no edema, no cyanosis  Psychiatric:   Oriented to time, place, person and situation. Appropriate mood and affect    Pelvic Exam:  External Genitalia:  normal appearance, hair distribution, and no lesions  Urethral Meatus:   normal in size, location, without lesions and prolapse  Bladder:    no fullness, masses or tenderness  Vagina:    normal appearance without lesions, no abnormal discharge  Cervix:    normal without tenderness on motion  Uterus:    normal in size, contour, position, mobility, without tenderness  Adnexa:   normal without masses or tenderness  Perineum:   normal  Anus:    no hemorroids    Results        ASSESSMENT & PLAN:     Lenin was seen today for gyn exam.    Diagnoses and all orders for this visit:    Encounter for gynecological examination without abnormal finding    Visit for screening mammogram  -     Mammogram Screen 3D Digital Bilateral; Future        Assessment & Plan  Postmenopausal bleeding  No further episodes since the negative evaluation in February. She declined Provera due to recent knee surgery. If bleeding recurs, a D&C under anesthesia is planned with the use of a camera to ensure no abnormalities are missed.  - Proceed with D&C under anesthesia if postmenopausal bleeding recurs    Breast discharge  Intermittent white milky discharge from the left breast has resolved. No bloody or green discharge. Discussed potential elevated prolactin levels if associated with headaches and visual problems.  -  Advise against nipple stimulation to prevent further discharge  - Monitor for recurrence of discharge, headaches, or visual problems    Dense breast tissue  Dense breast tissue may complicate mammogram interpretation. Discussed the option of a whole breast ultrasound to aid in screening.  - Investigate insurance coverage for whole breast ultrasound and inform provider    Woman's Wellness Exam  Annual gynecologic exam performed. Last exam was in November 2021. Discussed the importance of regular breast self-exams and the timing of these exams. Addressed dense breast tissue and the option of a whole breast ultrasound with the mammogram.  - Order mammogram after September 2025  - Encourage monthly breast self-exams  - Investigate insurance coverage for whole breast ultrasound and inform provider    Knee surgery  Recent knee surgery has impacted physical activity levels. Discussed challenges of weight management and knee pain. Encouraged gradual increase in physical activity while being mindful of knee health. Discussed availability of a weight loss clinic for safe weight management strategies.  - Refer to weight loss clinic for safe weight management strategies  - Encourage gradual increase in physical activity as tolerated      SUMMARY:    Pap: Next cotest 5/27-29 per ASCCP guidelines.    Mammogram: ordered placed -- due Sept 2025    BCM: Postmenopausal    STD screening: declines    Colon cancer screening: guidelines reviewed    Misc: Calcium needs reviewed (1500 mg diet + supplement). Weight bearing exercise encouraged. Call if any VB (if perimenopausal, reviewed abn VB patterns)    HM updated    Depression screen:   Depression Screening (PHQ-2/PHQ-9): Over the LAST 2 WEEKS   Little interest or pleasure in doing things (over the last two weeks)?: Not at all    Feeling down, depressed, or hopeless (over the last two weeks)?: Not at all    PHQ-2 SCORE: 0          FOLLOW-UP     Return in about 1 year (around 5/6/2026)  for annual gyne exam.    Note to patient and family:  The 21st Century Cures Act makes medical notes available to patients in the interest of transparency.  However, please be advised that this is a medical document.  It is intended as a peer to peer communication.  It is written in medical language and may contain abbreviations or verbiage that are technical and unfamiliar.  It may appear blunt or direct.  Medical documents are intended to carry relevant information, facts as evident, and the clinical opinion of the practitioner.

## 2025-06-02 DIAGNOSIS — J01.01 ACUTE RECURRENT MAXILLARY SINUSITIS: ICD-10-CM

## 2025-06-03 ENCOUNTER — PATIENT MESSAGE (OUTPATIENT)
Age: 60
End: 2025-06-03

## 2025-06-03 RX ORDER — LEVOCETIRIZINE DIHYDROCHLORIDE 5 MG/1
5 TABLET, FILM COATED ORAL EVERY EVENING
Qty: 90 TABLET | Refills: 3 | Status: SHIPPED | OUTPATIENT
Start: 2025-06-03

## 2025-06-03 NOTE — TELEPHONE ENCOUNTER
Refill passes per [facility] protocol.    No future appointments with primary care medicine    last office visit: 11/20/24

## 2025-06-05 RX ORDER — LOSARTAN POTASSIUM AND HYDROCHLOROTHIAZIDE 25; 100 MG/1; MG/1
1 TABLET ORAL DAILY
Qty: 90 TABLET | Refills: 1 | Status: SHIPPED | OUTPATIENT
Start: 2025-06-05

## 2025-07-09 ENCOUNTER — OFFICE VISIT (OUTPATIENT)
Age: 60
End: 2025-07-09
Payer: COMMERCIAL

## 2025-07-09 VITALS — DIASTOLIC BLOOD PRESSURE: 76 MMHG | HEART RATE: 68 BPM | SYSTOLIC BLOOD PRESSURE: 152 MMHG

## 2025-07-09 DIAGNOSIS — M17.0 PRIMARY OSTEOARTHRITIS OF BOTH KNEES: Primary | ICD-10-CM

## 2025-07-09 PROCEDURE — 20610 DRAIN/INJ JOINT/BURSA W/O US: CPT | Performed by: ORTHOPAEDIC SURGERY

## 2025-07-09 NOTE — PROGRESS NOTES
We discussed the diagnosis and treatment options and the patient felt they would benefit from the injection. I described the injection in detail including the risks, which include but are not limited to risk of infection, failure of treatment, local skin discoloration, temporary elevations in blood sugars, inflammatory reaction, persistent pain or increased pain at the injection site. Specifically we cautioned the patient regarding signs of infection and the need for immediate evaluation in this case. The patient verbalized an understanding of these risks and verbally consented to the injection. Allergies were reviewed.     Description of Procedure: An appropriate procedural time-out was performed confirming the patient’s name, date of birth, and the procedure to be performed. Following sterile prep, 4mL of 22mg/mL Monovisc was injection into the bilateral knee using the inferolateral approach. There were no complications or difficulties. Hemostasis was achieved. A small sterile band aid dressing was applied. The patient tolerated the procedure well.     Post Injection Instructions: The patient was encouraged to ice the area immediately following the injection and to avoid strenuous activity with the involved extremity for remainder of the day. The patient verbalized an understanding and all of their questions and concerns were addressed.

## 2025-07-13 DIAGNOSIS — J01.01 ACUTE RECURRENT MAXILLARY SINUSITIS: ICD-10-CM

## 2025-07-14 RX ORDER — AZELASTINE HYDROCHLORIDE 137 UG/1
2 SPRAY, METERED NASAL 2 TIMES DAILY
Qty: 30 ML | Refills: 1 | Status: SHIPPED | OUTPATIENT
Start: 2025-07-14

## 2025-08-25 ENCOUNTER — TELEPHONE (OUTPATIENT)
Dept: INTERNAL MEDICINE CLINIC | Facility: CLINIC | Age: 60
End: 2025-08-25

## (undated) DEVICE — DRAPE ARTHROSCOPY KNEE

## (undated) DEVICE — 3M™ STERI-STRIP™ REINFORCED ADHESIVE SKIN CLOSURES, R1547, 1/2 IN X 4 IN (12 MM X 100 MM), 6 STRIPS/ENVELOPE: Brand: 3M™ STERI-STRIP™

## (undated) DEVICE — APPLICATOR PREP 26ML CHG 2% ISO ALC 70%

## (undated) DEVICE — ARTHROSCOPY: Brand: MEDLINE INDUSTRIES, INC.

## (undated) DEVICE — MEDI-VAC NON-CONDUCTIVE SUCTION TUBING: Brand: CARDINAL HEALTH

## (undated) DEVICE — Device

## (undated) DEVICE — ENCORE® LATEX ACCLAIM SIZE 7, STERILE LATEX POWDER-FREE SURGICAL GLOVE: Brand: ENCORE

## (undated) DEVICE — CONMED SCOPE SAVER BITE BLOCK, 20X27 MM: Brand: SCOPE SAVER

## (undated) DEVICE — MYOSURE SINGLE USE SEAL SET

## (undated) DEVICE — MYOSURE LITE BLADE

## (undated) DEVICE — GIJAW SINGLE-USE BIOPSY FORCEPS WITH NEEDLE: Brand: GIJAW

## (undated) DEVICE — ADHESIVE LIQ 2/3ML VI MASTISOL

## (undated) DEVICE — KIT ENDO ORCAPOD 160/180/190

## (undated) DEVICE — V2 SPECIMEN COLLECTION MANIFOLD KIT: Brand: NEPTUNE

## (undated) DEVICE — GAMMEX® PI HYBRID SIZE 9, STERILE POWDER-FREE SURGICAL GLOVE, POLYISOPRENE AND NEOPRENE BLEND: Brand: GAMMEX

## (undated) DEVICE — SOL  .9 3000ML

## (undated) DEVICE — HYSTEROSCOPY: Brand: MEDLINE INDUSTRIES, INC.

## (undated) DEVICE — SOL  .9 1000ML BTL

## (undated) DEVICE — 60 ML SYRINGE REGULAR TIP: Brand: MONOJECT

## (undated) DEVICE — 1016 S-DRAPE IRRIG POUCH 10/BOX: Brand: STERI-DRAPE™

## (undated) DEVICE — SUCTION CANISTER, 3000CC,SAFELINER: Brand: DEROYAL

## (undated) DEVICE — SOCK CNSTR 4IN TNPSL UNV SPEC

## (undated) DEVICE — BLADE SHV L13CM DIA4MM CRV DBL CUT COOLCUT

## (undated) DEVICE — SUT VCRL 4-0 18IN ABSRB UD L13MM P-3 3/8

## (undated) DEVICE — BLADE SHV L13CM DIA4MM EXCALIBUR AGG COOLCUT

## (undated) DEVICE — KIT CLEAN ENDOKIT 1.1OZ GOWNX2

## (undated) DEVICE — MEDI-VAC NON-CONDUCTIVE SUCTION TUBING 6MM X 1.8M (6FT.) L: Brand: CARDINAL HEALTH

## (undated) DEVICE — GAMMEX® NON-LATEX PI ORTHO SIZE 8.5, STERILE POLYISOPRENE POWDER-FREE SURGICAL GLOVE: Brand: GAMMEX

## (undated) DEVICE — DISPOSABLE TOURNIQUET CUFF SINGLE BLADDER, DUAL PORT AND QUICK CONNECT CONNECTOR: Brand: COLOR CUFF

## (undated) DEVICE — TUBING PMP L16FT DISP INFLOW

## (undated) DEVICE — SET TUBI Y FL CNTRL INFL/OTFL

## (undated) DEVICE — GAMMEX® NON-LATEX PI ORTHO SIZE 9, STERILE POLYISOPRENE POWDER-FREE SURGICAL GLOVE: Brand: GAMMEX

## (undated) DEVICE — YANKAUER,BULB TIP,W/O VENT,RIGID,STERILE: Brand: MEDLINE

## (undated) DEVICE — SOLUTION IRRIG 3000ML 0.9% NACL FLX CONT

## (undated) DEVICE — GAMMEX® PI HYBRID SIZE 6.5, STERILE POWDER-FREE SURGICAL GLOVE, POLYISOPRENE AND NEOPRENE BLEND: Brand: GAMMEX

## (undated) NOTE — LETTER
AUTHORIZATION FOR SURGICAL OPERATION OR OTHER PROCEDURE    1. I hereby authorize Dr. Cruz, and [facility] staff assigned to my case to perform the following operation and/or procedure at the [facility] site:    ____________________Cortisone injection right knee_____________________________      _______________________________________________________________________________________________    2.  My physician has explained the nature and purpose of the operation or other procedure, possible alternative methods of treatment, the risks involved, and the possibility of complication to me.  I acknowledge that no guarantee has been made as to the result that may be obtained.  3.  I recognize that, during the course of this operation, or other procedure, unforseen conditions may necessitate additional or different procedure than those listed above.  I, therefore, further authorize and request that the above named physician, his/her physician assistants or designees perform such procedures as are, in his/her professional opinion, necessary and desirable.  4.  Any tissue or organs removed in the operation or other procedure may be disposed of by and at the discretion of the [facility] and [facility].  5.  I understand that in the event of a medical emergency, I will be transported by local paramedics to [facility] or other hospital emergency department.  6.  I certify that I have read and fully understand the above consent to operation and/or other procedure.    7.  I acknowledge that my physician has explained sedation/analgesia administration to me including the risks and benefits.  I consent to the administration of sedation/analgesia as may be necessary or desirable in the judgement of my physician.    Witness signature: ___________________________________________________ Date:  ______/______/_____                    Time:  ________ AYAZ KNOTT        Patient Name:  ______________________________________________________  (please print)      Patient signature:  ___________________________________________________             Relationship to Patient:           []  Parent    Responsible person                          []  Spouse  In case of minor or                    [] Other  _____________   Incompetent name:  __________________________________________________                               (please print)      _____________      Responsible person  In case of minor or  Incompetent signature:  _______________________________________________    Statement of Physician  My signature below affirms that prior to the time of the procedure, I have explained to the patient and/or his/her guardian, the risks and benefits involved in the proposed treatment and any reasonable alternative to the proposed treatment.  I have also explained the risks and benefits involved in the refusal of the proposed treatment and have answered the patient's questions.                        Date:  ______/______/_______  Provider                      Signature:  __________________________________________________________       Time:  ___________ A.M    P.M.

## (undated) NOTE — LETTER
December 14, 2021         Tyra Valente MD  [facility],[address]      Patient: Bernadette Granados   YOB: 1965   Date of Visit: 12/14/2021       Dear Dr. Tato Booker MD,    I saw your patient, Bernadette Granados, on 1

## (undated) NOTE — LETTER
AUTHORIZATION FOR SURGICAL OPERATION OR OTHER PROCEDURE    1. I hereby authorize , and [facility] staff assigned to my case to perform the following operation and/or procedure at the [facility] site:    _______________________________________________________________________________________________    Bilateral knee Monovisc injection   _______________________________________________________________________________________________    2.  My physician has explained the nature and purpose of the operation or other procedure, possible alternative methods of treatment, the risks involved, and the possibility of complication to me.  I acknowledge that no guarantee has been made as to the result that may be obtained.  3.  I recognize that, during the course of this operation, or other procedure, unforseen conditions may necessitate additional or different procedure than those listed above.  I, therefore, further authorize and request that the above named physician, his/her physician assistants or designees perform such procedures as are, in his/her professional opinion, necessary and desirable.  4.  Any tissue or organs removed in the operation or other procedure may be disposed of by and at the discretion of the [facility] and [facility].  5.  I understand that in the event of a medical emergency, I will be transported by local paramedics to [facility] or other hospital emergency department.  6.  I certify that I have read and fully understand the above consent to operation and/or other procedure.    7.  I acknowledge that my physician has explained sedation/analgesia administration to me including the risks and benefits.  I consent to the administration of sedation/analgesia as may be necessary or desirable in the judgement of my physician.    Witness signature: ___________________________________________________ Date:  ______/______/_____                     Time:  ________ A.M.  P.M.       Patient Name:  ______________________________________________________  (please print)      Patient signature:  ___________________________________________________             Relationship to Patient:           []  Parent    Responsible person                          []  Spouse  In case of minor or                    [] Other  _____________   Incompetent name:  __________________________________________________                               (please print)      _____________      Responsible person  In case of minor or  Incompetent signature:  _______________________________________________    Statement of Physician  My signature below affirms that prior to the time of the procedure, I have explained to the patient and/or his/her guardian, the risks and benefits involved in the proposed treatment and any reasonable alternative to the proposed treatment.  I have also explained the risks and benefits involved in the refusal of the proposed treatment and have answered the patient's questions.                        Date:  ______/______/_______  Provider                      Signature:  __________________________________________________________       Time:  ___________ A.M    P.M.

## (undated) NOTE — LETTER
2/25/2019              eLnin Rodriguez        [address]         Dear Anastasiya Druand records indicate that the 24 hr holter monitor tests ordered for you by Abhishek Mckeon MD  have not been done.   If you have, in fact, a

## (undated) NOTE — LETTER
[place]                20      Patient: Kortney Ramirez  : 8/15/1965 Visit date: 2021    Dear  Clearance Bette,      I examined your patient in consultation today.     She has severe, painful flexor synovitis of the right middle finger, which is con

## (undated) NOTE — LETTER
AUTHORIZATION FOR SURGICAL OPERATION OR OTHER PROCEDURE    1.  I hereby authorize Dr. Navi Bower, and [facility] staff assigned to my case to perform the following operation and/or procedure at the [facility]:    ___________________________________ Patient Name:  ______________________________________________________  (please print)      Patient signature:  ___________________________________________________             Relationship to Patient:           []  Parent    Responsible person

## (undated) NOTE — LETTER
MINOR CASE LETTER      4/16/2021        Dear Oracio Gifford are having a Hysteroscopic Polypectomy, Cervical Polypectomy on Wednesday,06/09/2021 at 1pm at [facility].    Do not eat or drink anything (including water) after midnight the ni appointment for 2 weeks after surgery. Please feel free to contact our office at  if you have any questions regarding these instructions or your procedure.       Sincerely,          Fred Valera MD  [facility] [place]

## (undated) NOTE — LETTER
02/27/20        [address]      Dear Ernie Sorensen records indicate that you have outstanding lab work and or testing that was ordered for you and has not yet been completed:  Orders Placed This Encounter

## (undated) NOTE — LETTER
[place] [address], [place], [place]    Authorization for Surgical Operation and Procedure                               I hereby authorize Ashlee Victoria MD, my physician and his/her assistants (if applicable), which may include medical students, residents, and/or fellows, to perform the following surgical operation/ procedure and administer such anesthesia as may be determined necessary by my physician: Operation/Procedure name (s) ESOPHAGOGASTRODUODENOSCOPY on Lenin Rodriguez   2.   I recognize that during the surgical operation/procedure, unforeseen conditions may necessitate additional or different procedures than those listed above.  I, therefore, further authorize and request that the above-named surgeon, assistants, or designees perform such procedures as are, in their judgment, necessary and desirable.    3.   My surgeon/physician has discussed prior to my surgery the potential benefits, risks and side effects of this procedure; the likelihood of achieving goals; and potential problems that might occur during recuperation.  They also discussed reasonable alternatives to the procedure, including risks, benefits, and side effects related to the alternatives and risks related to not receiving this procedure.  I have had all my questions answered and I acknowledge that no guarantee has been made as to the result that may be obtained.    4.   Should the need arise during my operation/procedure, which includes change of level of care prior to discharge, I also consent to the administration of blood and/or blood products.  Further, I understand that despite careful testing and screening of blood or blood products by collecting agencies, I may still be subject to ill effects as a result of receiving a blood transfusion and/or blood products.  The following are some, but not all, of the potential risks that can occur: fever and allergic reactions, hemolytic reactions,  transmission of diseases such as Hepatitis, AIDS and Cytomegalovirus (CMV) and fluid overload.  In the event that I wish to have an autologous transfusion of my own blood, or a directed donor transfusion, I will discuss this with my physician.  Check only if Refusing Blood or Blood Products  I understand refusal of blood or blood products as deemed necessary by my physician may have serious consequences to my condition to include possible death. I hereby assume responsibility for my refusal and release the hospital, its personnel, and my physicians from any responsibility for the consequences of my refusal.    o  Refuse   5.   I authorize the use of any specimen, organs, tissues, body parts or foreign objects that may be removed from my body during the operation/procedure for diagnosis, research or teaching purposes and their subsequent disposal by hospital authorities.  I also authorize the release of specimen test results and/or written reports to my treating physician on the hospital medical staff or other referring or consulting physicians involved in my care, at the discretion of the Pathologist or my treating physician.    6.   I consent to the photographing or videotaping of the operations or procedures to be performed, including appropriate portions of my body for medical, scientific, or educational purposes, provided my identity is not revealed by the pictures or by descriptive texts accompanying them.  If the procedure has been photographed/videotaped, the surgeon will obtain the original picture, image, videotape or CD.  The hospital will not be responsible for storage, release or maintenance of the picture, image, tape or CD.    7.   I consent to the presence of a  or observers in the operating room as deemed necessary by my physician or their designees.    8.   I recognize that in the event my procedure results in extended X-Ray/fluoroscopy time, I may develop a skin reaction.    9. If  I have a Do Not Attempt Resuscitation (DNAR) order in place, that status will be suspended while in the operating room, procedural suite, and during the recovery period unless otherwise explicitly stated by me (or a person authorized to consent on my behalf). The surgeon or my attending physician will determine when the applicable recovery period ends for purposes of reinstating the DNAR order.  10. Patients having a sterilization procedure: I understand that if the procedure is successful the results will be permanent and it will therefore be impossible for me to inseminate, conceive, or bear children.  I also understand that the procedure is intended to result in sterility, although the result has not been guaranteed.   11. I acknowledge that my physician has explained sedation/analgesia administration to me including the risk and benefits I consent to the administration of sedation/analgesia as may be necessary or desirable in the judgment of my physician.    I CERTIFY THAT I HAVE READ AND FULLY UNDERSTAND THE ABOVE CONSENT TO OPERATION and/or OTHER PROCEDURE.     ____________________________________  _________________________________        ______________________________  Signature of Patient    Signature of Responsible Person                Printed Name of Responsible Person                                      ____________________________________  _____________________________                ________________________________  Signature of Witness        Date  Time         Relationship to Patient    STATEMENT OF PHYSICIAN My signature below affirms that prior to the time of the procedure; I have explained to the patient and/or his/her legal representative, the risks and benefits involved in the proposed treatment and any reasonable alternative to the proposed treatment. I have also explained the risks and benefits involved in refusal of the proposed treatment and alternatives to the proposed treatment and have  answered the patient's questions. If I have a significant financial interest in a co-management agreement or a significant financial interest in any product or implant, or other significant relationship used in this procedure/surgery, I have disclosed this and had a discussion with my patient.     _____________________________________________________              _____________________________  (Signature of Physician)                                                                                         (Date)                                   (Time)  Patient Name: Lenin Adamsmary ellen Rodriguez      : 8/15/1965      Printed: 2025     Medical Record #: [id number]                                      Page 1 of 1

## (undated) NOTE — LETTER
MINOR CASE LETTER      4/16/2021        Dear Dalton Gregory are having a Hysteroscopic Polypectomy, Cervical Polypectomy on Tuesday,05/11/2021 at 1pm at [facility].    Do not eat or drink anything (including water) after midnight the [facility] appointment for 2 weeks after surgery. Please feel free to contact our office at  if you have any questions regarding these instructions or your procedure.       Sincerely,          Jayy Hill MD  [facility] [place]

## (undated) NOTE — LETTER
AUTHORIZATION FOR SURGICAL OPERATION OR OTHER PROCEDURE    1.  I hereby authorize Dr. Jennifer Akers and [facility] staff assigned to my case to perform the following operation and/or procedure at the [facility]:    ___ENDOMETRIAL BIOPSY______ Patient signature:  ___________________________________________________             Relationship to Patient:           []  Parent    Responsible person                          []  Spouse  In case of minor or                    [] Other  _____________   In

## (undated) NOTE — LETTER
AUTHORIZATION FOR SURGICAL OPERATION OR OTHER PROCEDURE    1. I hereby authorize Dr. HUGHES, and [facility] staff assigned to my case to perform the following operation and/or procedure at the [facility] site:      ENDOMETRIAL BIOPSY       2.  My physician has explained the nature and purpose of the operation or other procedure, possible alternative methods of treatment, the risks involved, and the possibility of complication to me.  I acknowledge that no guarantee has been made as to the result that may be obtained.  3.  I recognize that, during the course of this operation, or other procedure, unforseen conditions may necessitate additional or different procedure than those listed above.  I, therefore, further authorize and request that the above named physician, his/her physician assistants or designees perform such procedures as are, in his/her professional opinion, necessary and desirable.  4.  Any tissue or organs removed in the operation or other procedure may be disposed of by and at the discretion of the [facility] and [facility].  5.  I understand that in the event of a medical emergency, I will be transported by local paramedics to [facility] or other hospital emergency department.  6.  I certify that I have read and fully understand the above consent to operation and/or other procedure.    7.  I acknowledge that my physician has explained sedation/analgesia administration to me including the risks and benefits.  I consent to the administration of sedation/analgesia as may be necessary or desirable in the judgement of my physician.    Witness signature: ___________________________________________________ Date:  ______/______/_____                    Time:  ________ A.M.  P.M.       Patient Name:  ______________________________________________________  (please print)      Patient signature:   ___________________________________________________             Relationship to Patient:           []  Parent    Responsible person                          []  Spouse  In case of minor or                    [] Other  _____________   Incompetent name:  __________________________________________________                               (please print)      _____________      Responsible person  In case of minor or  Incompetent signature:  _______________________________________________    Statement of Physician  My signature below affirms that prior to the time of the procedure, I have explained to the patient and/or his/her guardian, the risks and benefits involved in the proposed treatment and any reasonable alternative to the proposed treatment.  I have also explained the risks and benefits involved in the refusal of the proposed treatment and have answered the patient's questions.                        Date:  ______/______/_______  Provider                      Signature:  __________________________________________________________       Time:  ___________ A.M    P.M.

## (undated) NOTE — LETTER
AUTHORIZATION FOR SURGICAL OPERATION OR OTHER PROCEDURE    1. I hereby authorize Dr. Cruz/Chevy Henao PA-C, and [facility] staff assigned to my case to perform the following operation and/or procedure at the [facility] site:    Right  knee cortisone injection   _______________________________________________________________________________________________      _______________________________________________________________________________________________    2.  My physician has explained the nature and purpose of the operation or other procedure, possible alternative methods of treatment, the risks involved, and the possibility of complication to me.  I acknowledge that no guarantee has been made as to the result that may be obtained.  3.  I recognize that, during the course of this operation, or other procedure, unforseen conditions may necessitate additional or different procedure than those listed above.  I, therefore, further authorize and request that the above named physician, his/her physician assistants or designees perform such procedures as are, in his/her professional opinion, necessary and desirable.  4.  Any tissue or organs removed in the operation or other procedure may be disposed of by and at the discretion of the [facility] and [facility].  5.  I understand that in the event of a medical emergency, I will be transported by local paramedics to [facility] or other hospital emergency department.  6.  I certify that I have read and fully understand the above consent to operation and/or other procedure.    7.  I acknowledge that my physician has explained sedation/analgesia administration to me including the risks and benefits.  I consent to the administration of sedation/analgesia as may be necessary or desirable in the judgement of my physician.    Witness signature: ___________________________________________________ Date:   ______/______/_____                    Time:  ________ A.M.  P.M.       Patient Name:  ______________________________________________________  (please print)      Patient signature:  ___________________________________________________             Relationship to Patient:           []  Parent    Responsible person                          []  Spouse  In case of minor or                    [] Other  _____________   Incompetent name:  __________________________________________________                               (please print)      _____________      Responsible person  In case of minor or  Incompetent signature:  _______________________________________________    Statement of Physician  My signature below affirms that prior to the time of the procedure, I have explained to the patient and/or his/her guardian, the risks and benefits involved in the proposed treatment and any reasonable alternative to the proposed treatment.  I have also explained the risks and benefits involved in the refusal of the proposed treatment and have answered the patient's questions.                        Date:  ______/______/_______  Provider                      Signature:  __________________________________________________________       Time:  ___________ A.M    P.M.

## (undated) NOTE — LETTER
The [facility]  Scheduling the Birth of Your [place]    You are scheduled for a  delivery on ***. You should arrive at the [facility] at ***. Please follow the enclosed antimicrobial wash instructions.   This wash should b place you on the fetal monitor to monitor the baby's well being and any uterine activity you may be experiencing prior to your delivery.   Your nurse will also ask you some questions, start an intravenous (IV) line, and possibly draw some blood if your lab

## (undated) NOTE — MR AVS SNAPSHOT
After Visit Summary   1/29/2020    Brian Elena    MRN: [id number]           Visit Information     Date & Time  1/29/2020 10:40 AM Provider  Erin Brower MD [address] [place] [address], [address] [place] [address],[address], [facility]  Phone  3 tongue. take 1 Tablet by Sublingual route  every day    Fluocinolone Acetonide Scalp 0.01 % External Oil External application as directed . MetFORMIN HCl  MG Oral Tablet 24 Hr Take 1 tablet (500 mg total) by mouth daily.       Diagnoses for This Vi Tips for increasing your physical activity – Adults who are physically active are less likely to develop some chronic diseases than adults who are inactive.      HOW TO GET STARTED: HOW TO STAY MOTIVATED:   Start activities slowly and build up over time Do Available at primary care offices    AFTER HOURS CARE  [place]       OFFICE VISIT   Primary Care Providers  Treatment for mild illness or injury that does not require immediate attention.      Average cost  $70*      [facility] CARE  [facility]

## (undated) NOTE — LETTER
INSTRUCTIONS FOR PRE-SURGICAL   ANTIMICROBIAL BATH/SHOWER    Your doctor has recommended a pre-surgical CHG (chlorhexidine gluconate) shower/bath with Betasept (also sold as Hibiclens). It reduces bacteria that can potentially cause infection.   Betasept or contact [employer]. Store between 60-80 degrees F. Fabric Warning! CHG WILL STAIN YOUR FABRICS! Use with care around shower curtains, towels washcloths rugs and clothes. Wipe surfaces immediately if accidentally splashed.       Holly Lau

## (undated) NOTE — LETTER
[place], [address] [facility]  [place], [place]       12/15/17        Patient: Mary Anne Martinez   YOB: 1965   Date of Visit: 12/15/2017       Dear  Dr. Daniel Mercado MD,      Thank you for referring Mary Anne Martinez to my practice.

## (undated) NOTE — LETTER
[facility]  Administration of Anesthesia  ILenin agree to be cared for by a physician anesthesiologist alone and/or with a nurse anesthetist, who is specially trained to monitor me and give me medicine to put me to sleep or keep me comfortable during my procedure    I understand that my anesthesiologist and/or anesthetist is not an employee or agent of [facility] or [employer]. He or she works for [facility]    As the patient asking for anesthesia services, I agree to:  Allow the anesthesiologist (anesthesia doctor) to give me medicine and do additional procedures as necessary. Some examples are: Starting or using an “IV” to give me medicine, fluids or blood during my procedure, and having a breathing tube placed to help me breathe when I’m asleep (intubation). In the event that my heart stops working properly, I understand that my anesthesiologist will make every effort to sustain my life, unless otherwise directed by [facility] Do Not Resuscitate documents.  Tell my anesthesia doctor before my procedure:  If I am pregnant.  The last time that I ate or drank.  iii. All of the medicines I take (including prescriptions, herbal supplements, and pills I can buy without a prescription (including street drugs/illegal medications). Failure to inform my anesthesiologist about these medicines may increase my risk of anesthetic complications.  iv.If I am allergic to anything or have had a reaction to anesthesia before.  I understand how the anesthesia medicine will help me (benefits).  I understand that with any type of anesthesia medicine there are risks:  The most common risks are: nausea, vomiting, sore throat, muscle soreness, damage to my eyes, mouth, or teeth (from breathing tube placement).  Rare risks include: remembering what happened during my procedure, allergic reactions to medications, injury to my airway, heart, lungs, vision, nerves, or  muscles and in extremely rare instances death.  My doctor has explained to me other choices available to me for my care (alternatives).  Pregnant Patients (“epidural”):  I understand that the risks of having an epidural (medicine given into my back to help control pain during labor), include itching, low blood pressure, difficulty urinating, headache or slowing of the baby’s heart. Very rare risks include infection, bleeding, seizure, irregular heart rhythms and nerve injury.  Regional Anesthesia (“spinal”, “epidural”, & “nerve blocks”):  I understand that rare but potential complications include headache, bleeding, infection, seizure, irregular heart rhythms, and nerve injury.    _____________________________________________________________________________  Patient (or Representative) Signature/Relationship to Patient  Date   Time    _____________________________________________________________________________   Name (if used)    Language/Organization   Time    _____________________________________________________________________________  Nurse Anesthetist Signature     Date   Time  _____________________________________________________________________________  Anesthesiologist Signature     Date   Time  I have discussed the procedure and information above with the patient (or patient’s representative) and answered their questions. The patient or their representative has agreed to have anesthesia services.    _____________________________________________________________________________  Witness        Date   Time  I have verified that the signature is that of the patient or patient’s representative, and that it was signed before the procedure  Patient Name: Lenin Rodriguez     : 8/15/1965                 Printed: 2025 at 8:55 AM    Medical Record #: [id number]                                            Page 1 of 1  ----------ANESTHESIA CONSENT----------

## (undated) NOTE — LETTER
08/09/21    [address]  [address] [facility]      Dear Sarita Galeano records indicate that you have outstanding lab work and or testing that was ordered for you and has not yet been completed:  Orders Placed This Encounter

## (undated) NOTE — ED AVS SNAPSHOT
Rose Stevens   MRN: [id number]    Department:  [facility] Emergency Department   Date of Visit:  7/5/2018           Disclosure     Insurance plans vary and the physician(s) referred by the ER may not be covered by your plan.  Please contact y CARE PHYSICIAN AT ONCE OR RETURN IMMEDIATELY TO THE EMERGENCY DEPARTMENT. If you have been prescribed any medication(s), please fill your prescription right away and begin taking the medication(s) as directed.   If you believe that any of the medications

## (undated) NOTE — LETTER
AUTHORIZATION FOR SURGICAL OPERATION OR OTHER PROCEDURE    1. I hereby authorize Dr. Cruz/ Earlene, Shemar CHRISTOPHER, and [facility] staff assigned to my case to perform the following operation and/or procedure at the [facility] site:    _______________________________________________________________________________________________    Cortisone Injection to Right Knee  _______________________________________________________________________________________________    2.  My physician has explained the nature and purpose of the operation or other procedure, possible alternative methods of treatment, the risks involved, and the possibility of complication to me.  I acknowledge that no guarantee has been made as to the result that may be obtained.  3.  I recognize that, during the course of this operation, or other procedure, unforseen conditions may necessitate additional or different procedure than those listed above.  I, therefore, further authorize and request that the above named physician, his/her physician assistants or designees perform such procedures as are, in his/her professional opinion, necessary and desirable.  4.  Any tissue or organs removed in the operation or other procedure may be disposed of by and at the discretion of the [facility] and [facility].  5.  I understand that in the event of a medical emergency, I will be transported by local paramedics to [facility] or other hospital emergency department.  6.  I certify that I have read and fully understand the above consent to operation and/or other procedure.    7.  I acknowledge that my physician has explained sedation/analgesia administration to me including the risks and benefits.  I consent to the administration of sedation/analgesia as may be necessary or desirable in the judgement of my physician.    Witness signature: ___________________________________________________ Date:   ______/______/_____                    Time:  ________ A.M.  P.M.       Patient Name:  ______________________________________________________  (please print)      Patient signature:  ___________________________________________________             Relationship to Patient:           []  Parent    Responsible person                          []  Spouse  In case of minor or                    [] Other  _____________   Incompetent name:  __________________________________________________                               (please print)      _____________      Responsible person  In case of minor or  Incompetent signature:  _______________________________________________    Statement of Physician  My signature below affirms that prior to the time of the procedure, I have explained to the patient and/or his/her guardian, the risks and benefits involved in the proposed treatment and any reasonable alternative to the proposed treatment.  I have also explained the risks and benefits involved in the refusal of the proposed treatment and have answered the patient's questions.                        Date:  ______/______/_______  Provider                      Signature:  __________________________________________________________       Time:  ___________ A.M    P.M.

## (undated) NOTE — LETTER
AUTHORIZATION FOR SURGICAL OPERATION OR OTHER PROCEDURE    1. I hereby authorize Dr. Cruz/ CANDI Henao , and [facility] staff assigned to my case to perform the following operation and/or procedure at the [facility] site:    Cortisone injection in Left knee   _______________________________________________________________________________________________      _______________________________________________________________________________________________    2.  My physician has explained the nature and purpose of the operation or other procedure, possible alternative methods of treatment, the risks involved, and the possibility of complication to me.  I acknowledge that no guarantee has been made as to the result that may be obtained.  3.  I recognize that, during the course of this operation, or other procedure, unforseen conditions may necessitate additional or different procedure than those listed above.  I, therefore, further authorize and request that the above named physician, his/her physician assistants or designees perform such procedures as are, in his/her professional opinion, necessary and desirable.  4.  Any tissue or organs removed in the operation or other procedure may be disposed of by and at the discretion of the [facility] and [facility].  5.  I understand that in the event of a medical emergency, I will be transported by local paramedics to [facility] or other hospital emergency department.  6.  I certify that I have read and fully understand the above consent to operation and/or other procedure.    7.  I acknowledge that my physician has explained sedation/analgesia administration to me including the risks and benefits.  I consent to the administration of sedation/analgesia as may be necessary or desirable in the judgement of my physician.    Witness signature: ___________________________________________________ Date:   ______/______/_____                    Time:  ________ A.M.  P.M.       Patient Name:  ______________________________________________________  (please print)      Patient signature:  ___________________________________________________             Relationship to Patient:           []  Parent    Responsible person                          []  Spouse  In case of minor or                    [] Other  _____________   Incompetent name:  __________________________________________________                               (please print)      _____________      Responsible person  In case of minor or  Incompetent signature:  _______________________________________________    Statement of Physician  My signature below affirms that prior to the time of the procedure, I have explained to the patient and/or his/her guardian, the risks and benefits involved in the proposed treatment and any reasonable alternative to the proposed treatment.  I have also explained the risks and benefits involved in the refusal of the proposed treatment and have answered the patient's questions.                        Date:  ______/______/_______  Provider                      Signature:  __________________________________________________________       Time:  ___________ A.M    P.M.